# Patient Record
Sex: FEMALE | Race: WHITE | NOT HISPANIC OR LATINO | Employment: OTHER | URBAN - METROPOLITAN AREA
[De-identification: names, ages, dates, MRNs, and addresses within clinical notes are randomized per-mention and may not be internally consistent; named-entity substitution may affect disease eponyms.]

---

## 2021-02-05 ENCOUNTER — HOSPITAL ENCOUNTER (OUTPATIENT)
Dept: GENERAL RADIOLOGY | Facility: HOSPITAL | Age: 61
Discharge: HOME | End: 2021-02-05
Admitting: INTERNAL MEDICINE

## 2021-02-05 ENCOUNTER — TRANSCRIBE ORDERS (OUTPATIENT)
Dept: ADMINISTRATIVE | Facility: HOSPITAL | Age: 61
End: 2021-02-05

## 2021-02-05 DIAGNOSIS — U07.1 REAL TIME REVERSE TRANSCRIPTASE PCR POSITIVE FOR COVID-19 VIRUS: Primary | ICD-10-CM

## 2021-02-05 DIAGNOSIS — U07.1 REAL TIME REVERSE TRANSCRIPTASE PCR POSITIVE FOR COVID-19 VIRUS: ICD-10-CM

## 2021-02-05 PROCEDURE — 71046 X-RAY EXAM CHEST 2 VIEWS: CPT

## 2021-03-17 ENCOUNTER — IMMUNIZATION (OUTPATIENT)
Dept: VACCINE CLINIC | Facility: HOSPITAL | Age: 61
End: 2021-03-17

## 2021-03-17 PROCEDURE — 91300 HC SARSCOV02 VAC 30MCG/0.3ML IM: CPT | Performed by: INTERNAL MEDICINE

## 2021-03-17 PROCEDURE — 0001A: CPT | Performed by: INTERNAL MEDICINE

## 2021-04-07 ENCOUNTER — IMMUNIZATION (OUTPATIENT)
Dept: VACCINE CLINIC | Facility: HOSPITAL | Age: 61
End: 2021-04-07

## 2021-04-07 PROCEDURE — 0002A: CPT | Performed by: INTERNAL MEDICINE

## 2021-04-07 PROCEDURE — 91300 HC SARSCOV02 VAC 30MCG/0.3ML IM: CPT | Performed by: INTERNAL MEDICINE

## 2021-06-30 ENCOUNTER — TRANSCRIBE ORDERS (OUTPATIENT)
Dept: ADMINISTRATIVE | Facility: HOSPITAL | Age: 61
End: 2021-06-30

## 2021-06-30 DIAGNOSIS — Z12.31 VISIT FOR SCREENING MAMMOGRAM: Primary | ICD-10-CM

## 2021-07-08 ENCOUNTER — HOSPITAL ENCOUNTER (OUTPATIENT)
Dept: GENERAL RADIOLOGY | Facility: HOSPITAL | Age: 61
Discharge: HOME | End: 2021-07-08
Admitting: PHYSICIAN ASSISTANT

## 2021-07-08 ENCOUNTER — TRANSCRIBE ORDERS (OUTPATIENT)
Dept: ADMINISTRATIVE | Facility: HOSPITAL | Age: 61
End: 2021-07-08

## 2021-07-08 DIAGNOSIS — S99.922A FOOT INJURY, LEFT, INITIAL ENCOUNTER: Primary | ICD-10-CM

## 2021-07-08 PROCEDURE — 73630 X-RAY EXAM OF FOOT: CPT

## 2021-08-30 ENCOUNTER — APPOINTMENT (OUTPATIENT)
Dept: OTHER | Facility: HOSPITAL | Age: 61
End: 2021-08-30

## 2021-08-30 ENCOUNTER — HOSPITAL ENCOUNTER (OUTPATIENT)
Dept: MAMMOGRAPHY | Facility: HOSPITAL | Age: 61
Discharge: HOME | End: 2021-08-30
Admitting: INTERNAL MEDICINE

## 2021-08-30 DIAGNOSIS — Z12.31 VISIT FOR SCREENING MAMMOGRAM: ICD-10-CM

## 2021-08-30 PROCEDURE — 77067 SCR MAMMO BI INCL CAD: CPT

## 2021-08-30 PROCEDURE — 77063 BREAST TOMOSYNTHESIS BI: CPT | Performed by: RADIOLOGY

## 2021-08-30 PROCEDURE — 77063 BREAST TOMOSYNTHESIS BI: CPT

## 2021-08-30 PROCEDURE — 77067 SCR MAMMO BI INCL CAD: CPT | Performed by: RADIOLOGY

## 2021-09-03 ENCOUNTER — HOSPITAL ENCOUNTER (OUTPATIENT)
Dept: MAMMOGRAPHY | Facility: HOSPITAL | Age: 61
Discharge: HOME | End: 2021-09-03

## 2021-09-03 ENCOUNTER — HOSPITAL ENCOUNTER (OUTPATIENT)
Dept: ULTRASOUND IMAGING | Facility: HOSPITAL | Age: 61
Discharge: HOME | End: 2021-09-03

## 2021-09-03 DIAGNOSIS — R92.8 ABNORMAL MAMMOGRAM: ICD-10-CM

## 2021-09-03 PROCEDURE — 88360 TUMOR IMMUNOHISTOCHEM/MANUAL: CPT | Performed by: INTERNAL MEDICINE

## 2021-09-03 PROCEDURE — 76642 ULTRASOUND BREAST LIMITED: CPT | Performed by: RADIOLOGY

## 2021-09-03 PROCEDURE — 88305 TISSUE EXAM BY PATHOLOGIST: CPT | Performed by: INTERNAL MEDICINE

## 2021-09-03 PROCEDURE — 88341 IMHCHEM/IMCYTCHM EA ADD ANTB: CPT | Performed by: INTERNAL MEDICINE

## 2021-09-03 PROCEDURE — 19083 BX BREAST 1ST LESION US IMAG: CPT | Performed by: RADIOLOGY

## 2021-09-03 PROCEDURE — 77065 DX MAMMO INCL CAD UNI: CPT | Performed by: RADIOLOGY

## 2021-09-03 PROCEDURE — 77061 BREAST TOMOSYNTHESIS UNI: CPT | Performed by: RADIOLOGY

## 2021-09-03 PROCEDURE — A4648 IMPLANTABLE TISSUE MARKER: HCPCS

## 2021-09-03 PROCEDURE — 88342 IMHCHEM/IMCYTCHM 1ST ANTB: CPT | Performed by: INTERNAL MEDICINE

## 2021-09-03 PROCEDURE — 76642 ULTRASOUND BREAST LIMITED: CPT

## 2021-09-03 PROCEDURE — 25010000003 LIDOCAINE 1 % SOLUTION: Performed by: RADIOLOGY

## 2021-09-03 PROCEDURE — 77065 DX MAMMO INCL CAD UNI: CPT

## 2021-09-03 PROCEDURE — G0279 TOMOSYNTHESIS, MAMMO: HCPCS

## 2021-09-03 RX ORDER — LIDOCAINE HYDROCHLORIDE 10 MG/ML
5 INJECTION, SOLUTION INFILTRATION; PERINEURAL ONCE
Status: COMPLETED | OUTPATIENT
Start: 2021-09-03 | End: 2021-09-03

## 2021-09-03 RX ORDER — LIDOCAINE HYDROCHLORIDE AND EPINEPHRINE 10; 10 MG/ML; UG/ML
10 INJECTION, SOLUTION INFILTRATION; PERINEURAL ONCE
Status: COMPLETED | OUTPATIENT
Start: 2021-09-03 | End: 2021-09-03

## 2021-09-03 RX ADMIN — LIDOCAINE HYDROCHLORIDE,EPINEPHRINE BITARTRATE 6 ML: 10; .01 INJECTION, SOLUTION INFILTRATION; PERINEURAL at 15:21

## 2021-09-03 RX ADMIN — LIDOCAINE HYDROCHLORIDE 1 ML: 10 INJECTION, SOLUTION INFILTRATION; PERINEURAL at 15:21

## 2021-09-08 ENCOUNTER — TELEPHONE (OUTPATIENT)
Dept: MAMMOGRAPHY | Facility: HOSPITAL | Age: 61
End: 2021-09-08

## 2021-09-08 NOTE — TELEPHONE ENCOUNTER
Returned patient's 's call, requesting pathology results if available; notified report not available. Questions answered; support given; told they will be notified when results are available. Encouraged to call back with further questions or concerns. Patient's  verbalized understanding.

## 2021-09-09 ENCOUNTER — TELEPHONE (OUTPATIENT)
Dept: MAMMOGRAPHY | Facility: HOSPITAL | Age: 61
End: 2021-09-09

## 2021-09-09 LAB
CYTO UR: NORMAL
LAB AP CASE REPORT: NORMAL
LAB AP DIAGNOSIS COMMENT: NORMAL
LAB AP SPECIAL STAINS: NORMAL
PATH REPORT.FINAL DX SPEC: NORMAL
PATH REPORT.GROSS SPEC: NORMAL

## 2021-09-09 NOTE — TELEPHONE ENCOUNTER
Referring provider's office notified pathology returned as cancer & patient will be notified.   Pt notified of pathology results and recommendation. Verbalizes understanding. Denies discomfort. Denies signs and symptoms of infection.   Patient desires Dr Mc for surgical consult. Reviewed what would be discussed at surgical consult visit, including detailed explanation of pathology report & imaging reports; treatment options & pros/cons, availability of Breast Nurse Navigator. Patient encouraged to call back or contact Breast Nurse Navigator, with any questions or concerns.   Pt information sent to Breast Nurse Navigator for evaluation & Genetics for possible referral for genetic counseling.  Breast cancer information packet offered and accepted.

## 2021-09-10 ENCOUNTER — TELEPHONE (OUTPATIENT)
Dept: MAMMOGRAPHY | Facility: HOSPITAL | Age: 61
End: 2021-09-10

## 2021-09-10 NOTE — TELEPHONE ENCOUNTER
Pt notified of surgical consult appointment with Dr. Mc on 9.22.21 @ 5238. Pt given office contact & location information. Told to bring photo ID, list of prescription & OTC medications, insurance information, must wear a mask & she can bring one person with her who must also wear a mask.  Encouraged pt to call back or contact surgeon's office with further questions. Pt verbalized understanding.

## 2021-09-13 ENCOUNTER — NURSE NAVIGATOR (OUTPATIENT)
Dept: OTHER | Facility: HOSPITAL | Age: 61
End: 2021-09-13

## 2021-09-13 NOTE — PROGRESS NOTES
Patient and her , Jose phoned on speaker phone to discuss pathology report from breast biopsy - I reviewed with them. I called and spoke to Dr LOPEZ - we will move patients appointment from 9/22/2021 to 9/15/2021 4PM. I will speak to patients daughter Evonne this afternoon on the phone - she is away at college and very concerned. The patient and her  verbalized having a better understanding of the pathology and plan of care.

## 2021-09-15 ENCOUNTER — NURSE NAVIGATOR (OUTPATIENT)
Dept: OTHER | Facility: HOSPITAL | Age: 61
End: 2021-09-15

## 2021-09-16 NOTE — PROGRESS NOTES
I saw patient with Dr LOPEZ and patients , Jose. Patients daughter Evonne was on speaker phone- DR LOPEZ reviewed the pathology report and surgical/treatment options. Left breast, stage IA, HG IDC, ER/ME positive and HER negative breast cancer- the patient will wait on MRI to make final surgical decision. Educational and Supportive materials were given and reviewed.     Dr LOPEZ wrote for xanax - patient states she will take for MRI - I reviewed instructions that someone will need to drive her to MRI and to wait until she arrives and has signed consent before taking xanax - she verbalized understanding. Patient met with Rita Guerrero OT.

## 2021-09-28 ENCOUNTER — HOSPITAL ENCOUNTER (OUTPATIENT)
Dept: MRI IMAGING | Facility: HOSPITAL | Age: 61
Discharge: HOME | End: 2021-09-28
Admitting: SURGERY

## 2021-09-28 DIAGNOSIS — C50.412 MALIGNANT NEOPLASM OF UPPER-OUTER QUADRANT OF LEFT FEMALE BREAST (HCC): ICD-10-CM

## 2021-09-28 LAB — CREAT BLDA-MCNC: 0.9 MG/DL (ref 0.6–1.3)

## 2021-09-28 PROCEDURE — A9577 INJ MULTIHANCE: HCPCS | Performed by: SURGERY

## 2021-09-28 PROCEDURE — 82565 ASSAY OF CREATININE: CPT

## 2021-09-28 PROCEDURE — 77049 MRI BREAST C-+ W/CAD BI: CPT

## 2021-09-28 PROCEDURE — 77049 MRI BREAST C-+ W/CAD BI: CPT | Performed by: RADIOLOGY

## 2021-09-28 PROCEDURE — 0 GADOBENATE DIMEGLUMINE 529 MG/ML SOLUTION: Performed by: SURGERY

## 2021-09-28 RX ADMIN — GADOBENATE DIMEGLUMINE 19 ML: 529 INJECTION, SOLUTION INTRAVENOUS at 09:04

## 2021-09-30 ENCOUNTER — TRANSCRIBE ORDERS (OUTPATIENT)
Dept: ADMINISTRATIVE | Facility: HOSPITAL | Age: 61
End: 2021-09-30

## 2021-09-30 ENCOUNTER — NURSE NAVIGATOR (OUTPATIENT)
Dept: OTHER | Facility: HOSPITAL | Age: 61
End: 2021-09-30

## 2021-09-30 DIAGNOSIS — C50.412 MALIGNANT NEOPLASM OF UPPER-OUTER QUADRANT OF LEFT FEMALE BREAST, UNSPECIFIED ESTROGEN RECEPTOR STATUS (HCC): Primary | ICD-10-CM

## 2021-09-30 NOTE — PROGRESS NOTES
Patient's  left a message that they received a call from Dr. LOPEZ's office regarding the next steps. They do not need further assistance and will call with any new concerns.

## 2021-09-30 NOTE — PROGRESS NOTES
Returned call regarding MRI results and questions. Left a VM to please return call. Will follow up.

## 2021-10-01 ENCOUNTER — HOSPITAL ENCOUNTER (OUTPATIENT)
Dept: CT IMAGING | Facility: HOSPITAL | Age: 61
Discharge: HOME | End: 2021-10-01
Admitting: SURGERY

## 2021-10-01 DIAGNOSIS — C50.412 MALIGNANT NEOPLASM OF UPPER-OUTER QUADRANT OF LEFT FEMALE BREAST, UNSPECIFIED ESTROGEN RECEPTOR STATUS (HCC): ICD-10-CM

## 2021-10-01 PROCEDURE — 25010000002 IOPAMIDOL 61 % SOLUTION: Performed by: SURGERY

## 2021-10-01 PROCEDURE — 71260 CT THORAX DX C+: CPT

## 2021-10-01 RX ADMIN — IOPAMIDOL 95 ML: 612 INJECTION, SOLUTION INTRAVENOUS at 08:45

## 2021-10-04 ENCOUNTER — TRANSCRIBE ORDERS (OUTPATIENT)
Dept: ADMINISTRATIVE | Facility: HOSPITAL | Age: 61
End: 2021-10-04

## 2021-10-04 DIAGNOSIS — C50.412 MALIGNANT NEOPLASM OF UPPER-OUTER QUADRANT OF LEFT FEMALE BREAST, UNSPECIFIED ESTROGEN RECEPTOR STATUS (HCC): Primary | ICD-10-CM

## 2021-10-08 ENCOUNTER — PRE-ADMISSION TESTING (OUTPATIENT)
Dept: PREADMISSION TESTING | Facility: HOSPITAL | Age: 61
End: 2021-10-08

## 2021-10-08 VITALS — HEIGHT: 64 IN | WEIGHT: 210.32 LBS | BODY MASS INDEX: 35.91 KG/M2

## 2021-10-08 LAB
ALBUMIN SERPL-MCNC: 4.5 G/DL (ref 3.5–5.2)
ALBUMIN/GLOB SERPL: 1.9 G/DL
ALP SERPL-CCNC: 130 U/L (ref 39–117)
ALT SERPL W P-5'-P-CCNC: 27 U/L (ref 1–33)
ANION GAP SERPL CALCULATED.3IONS-SCNC: 11 MMOL/L (ref 5–15)
ANION GAP SERPL CALCULATED.3IONS-SCNC: 11 MMOL/L (ref 5–15)
AST SERPL-CCNC: 24 U/L (ref 1–32)
BILIRUB SERPL-MCNC: 0.3 MG/DL (ref 0–1.2)
BUN SERPL-MCNC: 22 MG/DL (ref 6–20)
BUN SERPL-MCNC: 22 MG/DL (ref 6–20)
BUN/CREAT SERPL: 23.2 (ref 7–25)
BUN/CREAT SERPL: 23.2 (ref 7–25)
CALCIUM SPEC-SCNC: 9.9 MG/DL (ref 8.6–10.5)
CALCIUM SPEC-SCNC: 9.9 MG/DL (ref 8.6–10.5)
CHLORIDE SERPL-SCNC: 104 MMOL/L (ref 98–107)
CHLORIDE SERPL-SCNC: 104 MMOL/L (ref 98–107)
CO2 SERPL-SCNC: 26 MMOL/L (ref 22–29)
CO2 SERPL-SCNC: 26 MMOL/L (ref 22–29)
CREAT SERPL-MCNC: 0.95 MG/DL (ref 0.57–1)
CREAT SERPL-MCNC: 0.95 MG/DL (ref 0.57–1)
DEPRECATED RDW RBC AUTO: 43.8 FL (ref 37–54)
ERYTHROCYTE [DISTWIDTH] IN BLOOD BY AUTOMATED COUNT: 12.4 % (ref 12.3–15.4)
GFR SERPL CREATININE-BSD FRML MDRD: 60 ML/MIN/1.73
GFR SERPL CREATININE-BSD FRML MDRD: 60 ML/MIN/1.73
GLOBULIN UR ELPH-MCNC: 2.4 GM/DL
GLUCOSE SERPL-MCNC: 96 MG/DL (ref 65–99)
GLUCOSE SERPL-MCNC: 96 MG/DL (ref 65–99)
HCT VFR BLD AUTO: 40.4 % (ref 34–46.6)
HGB BLD-MCNC: 13.1 G/DL (ref 12–15.9)
MCH RBC QN AUTO: 31.3 PG (ref 26.6–33)
MCHC RBC AUTO-ENTMCNC: 32.4 G/DL (ref 31.5–35.7)
MCV RBC AUTO: 96.7 FL (ref 79–97)
PLATELET # BLD AUTO: 253 10*3/MM3 (ref 140–450)
PMV BLD AUTO: 9.5 FL (ref 6–12)
POTASSIUM SERPL-SCNC: 4.4 MMOL/L (ref 3.5–5.2)
POTASSIUM SERPL-SCNC: 4.4 MMOL/L (ref 3.5–5.2)
PROT SERPL-MCNC: 6.9 G/DL (ref 6–8.5)
RBC # BLD AUTO: 4.18 10*6/MM3 (ref 3.77–5.28)
SODIUM SERPL-SCNC: 141 MMOL/L (ref 136–145)
SODIUM SERPL-SCNC: 141 MMOL/L (ref 136–145)
WBC # BLD AUTO: 4.86 10*3/MM3 (ref 3.4–10.8)

## 2021-10-08 PROCEDURE — 85027 COMPLETE CBC AUTOMATED: CPT

## 2021-10-08 PROCEDURE — 80053 COMPREHEN METABOLIC PANEL: CPT

## 2021-10-08 PROCEDURE — 36415 COLL VENOUS BLD VENIPUNCTURE: CPT

## 2021-10-08 RX ORDER — LOSARTAN POTASSIUM 50 MG/1
50 TABLET ORAL EVERY MORNING
COMMUNITY

## 2021-10-08 RX ORDER — MULTIPLE VITAMINS W/ MINERALS TAB 9MG-400MCG
1 TAB ORAL DAILY
COMMUNITY

## 2021-10-08 RX ORDER — ASPIRIN 81 MG/1
81 TABLET ORAL DAILY
COMMUNITY
End: 2021-10-15 | Stop reason: HOSPADM

## 2021-10-08 RX ORDER — BIOTIN 10 MG
TABLET ORAL
COMMUNITY
End: 2022-04-22

## 2021-10-08 RX ORDER — ALPRAZOLAM 0.5 MG/1
0.25 TABLET ORAL EVERY 8 HOURS PRN
Status: ON HOLD | COMMUNITY
End: 2022-11-03

## 2021-10-08 NOTE — PAT
Patient to apply Chlorhexadine wipes  to surgical area (as instructed) the night before procedure and the AM of procedure. Wipes provided.    Patient instructed to drink 20 ounces (or until full) of Gatorade and it needs to be completed 1 hour (for Main OR patients) or 2 hours (scheduled  section patients) before given arrival time for procedure (NO RED Gatorade)    Patient verbalized understanding.  Per Anesthesia Request, patient instructed not to take their ACE/ARB medications on the AM of surgery.

## 2021-10-12 ENCOUNTER — APPOINTMENT (OUTPATIENT)
Dept: PREADMISSION TESTING | Facility: HOSPITAL | Age: 61
End: 2021-10-12

## 2021-10-12 LAB — SARS-COV-2 RNA PNL SPEC NAA+PROBE: NOT DETECTED

## 2021-10-12 PROCEDURE — C9803 HOPD COVID-19 SPEC COLLECT: HCPCS

## 2021-10-12 PROCEDURE — U0004 COV-19 TEST NON-CDC HGH THRU: HCPCS

## 2021-10-14 ENCOUNTER — ANESTHESIA (OUTPATIENT)
Dept: PERIOP | Facility: HOSPITAL | Age: 61
End: 2021-10-14

## 2021-10-14 ENCOUNTER — ANESTHESIA EVENT (OUTPATIENT)
Dept: PERIOP | Facility: HOSPITAL | Age: 61
End: 2021-10-14

## 2021-10-14 ENCOUNTER — ANESTHESIA EVENT CONVERTED (OUTPATIENT)
Dept: ANESTHESIOLOGY | Facility: HOSPITAL | Age: 61
End: 2021-10-14

## 2021-10-14 ENCOUNTER — APPOINTMENT (OUTPATIENT)
Dept: PET IMAGING | Facility: HOSPITAL | Age: 61
End: 2021-10-14

## 2021-10-14 ENCOUNTER — HOSPITAL ENCOUNTER (OUTPATIENT)
Facility: HOSPITAL | Age: 61
Discharge: HOME | End: 2021-10-15
Attending: SURGERY | Admitting: SURGERY

## 2021-10-14 ENCOUNTER — HOSPITAL ENCOUNTER (OUTPATIENT)
Dept: NUCLEAR MEDICINE | Facility: HOSPITAL | Age: 61
Discharge: HOME | End: 2021-10-14

## 2021-10-14 DIAGNOSIS — C50.412 MALIGNANT NEOPLASM OF UPPER-OUTER QUADRANT OF LEFT FEMALE BREAST, UNSPECIFIED ESTROGEN RECEPTOR STATUS (HCC): ICD-10-CM

## 2021-10-14 DIAGNOSIS — C50.919 BREAST CANCER: ICD-10-CM

## 2021-10-14 PROCEDURE — 88307 TISSUE EXAM BY PATHOLOGIST: CPT | Performed by: SURGERY

## 2021-10-14 PROCEDURE — 25010000002 NEOSTIGMINE 10 MG/10ML SOLUTION: Performed by: NURSE ANESTHETIST, CERTIFIED REGISTERED

## 2021-10-14 PROCEDURE — C1789 PROSTHESIS, BREAST, IMP: HCPCS | Performed by: SURGERY

## 2021-10-14 PROCEDURE — 19303 MAST SIMPLE COMPLETE: CPT | Performed by: PHYSICIAN ASSISTANT

## 2021-10-14 PROCEDURE — 88331 PATH CONSLTJ SURG 1 BLK 1SPC: CPT | Performed by: PATHOLOGY

## 2021-10-14 PROCEDURE — A9541 TC99M SULFUR COLLOID: HCPCS | Performed by: SURGERY

## 2021-10-14 PROCEDURE — 38792 RA TRACER ID OF SENTINL NODE: CPT

## 2021-10-14 PROCEDURE — 25010000002 FENTANYL CITRATE (PF) 50 MCG/ML SOLUTION: Performed by: NURSE ANESTHETIST, CERTIFIED REGISTERED

## 2021-10-14 PROCEDURE — 25010000002 DEXAMETHASONE PER 1 MG: Performed by: NURSE ANESTHETIST, CERTIFIED REGISTERED

## 2021-10-14 PROCEDURE — 25010000003 CEFAZOLIN PER 500 MG: Performed by: SURGERY

## 2021-10-14 PROCEDURE — 25010000002 PROPOFOL 10 MG/ML EMULSION: Performed by: NURSE ANESTHETIST, CERTIFIED REGISTERED

## 2021-10-14 PROCEDURE — 25010000002 GENTAMICIN PER 80 MG: Performed by: SURGERY

## 2021-10-14 PROCEDURE — 25010000003 CEFAZOLIN IN DEXTROSE 2-4 GM/100ML-% SOLUTION: Performed by: PLASTIC SURGERY

## 2021-10-14 PROCEDURE — C1889 IMPLANT/INSERT DEVICE, NOC: HCPCS | Performed by: SURGERY

## 2021-10-14 PROCEDURE — 0 TECHNETIUM FILTERED SULFUR COLLOID: Performed by: SURGERY

## 2021-10-14 PROCEDURE — C1713 ANCHOR/SCREW BN/BN,TIS/BN: HCPCS | Performed by: SURGERY

## 2021-10-14 PROCEDURE — 0 TECHNETIUM MEDRONATE KIT: Performed by: SURGERY

## 2021-10-14 PROCEDURE — 25010000002 DEXAMETHASONE SODIUM PHOSPHATE 10 MG/ML SOLUTION: Performed by: ANESTHESIOLOGY

## 2021-10-14 PROCEDURE — 94799 UNLISTED PULMONARY SVC/PX: CPT

## 2021-10-14 PROCEDURE — A9503 TC99M MEDRONATE: HCPCS | Performed by: SURGERY

## 2021-10-14 DEVICE — DEV CONTRL TISS STRATAFIX SPIRAL MNCRYL UD 3/0 PLS 30CM: Type: IMPLANTABLE DEVICE | Site: BREAST | Status: FUNCTIONAL

## 2021-10-14 DEVICE — GRFT TISS ALLODERM RTM PERF 16X20CM 2.4MM/THK .4MM: Type: IMPLANTABLE DEVICE | Site: BREAST | Status: FUNCTIONAL

## 2021-10-14 DEVICE — STIMULAN® RAPID CURE PROVIDED STERILE FOR SINGLE PATIENT USE. STIMULAN® RAPID CURE CONTAINS CALCIUM SULFATE POWDER AND MIXING SOLUTION IN PRE-MEASURED QUANTITIES SO THAT WHEN MIXED TOGETHER IN A STERILE MIXING BOWL, THE RESULTANT PASTE IS TO BE DIGITALLY PACKED INTO OPEN BONE VOID/GAP TO SET INSITU OR PLACED INTO THE MOULD PROVIDED, THE MIXTURE SETS TO FORM BEADS. THE BIODEGRADABLE, RADIOPAQUE BEADS ARE RESORBED IN APPROXIMATELY 30 – 60 DAYS WHEN USED IN ACCORDANCE WITH THE DEVICE LABELLING. STIMULAN® RAPID CURE IS MANUFACTURED FROM SYNTHETIC IMPLANT GRADE CALCIUM SULFATE DIHYDRATE(CASO4.2H2O) THAT RESORBS AND IS REPLACED WITH BONE DURING THE HEALING PROCESS. ALSO, AS THE BONE VOID FILLER BEADS ARE BIODEGRADABLE AND BIOCOMPATIBLE, THEY MAY BE USED AT AN INFECTED SITE.
Type: IMPLANTABLE DEVICE | Site: BREAST | Status: FUNCTIONAL
Brand: STIMULAN® RAPID CURE

## 2021-10-14 DEVICE — EXPNDR TISS BRST F/HT V/P STL 133S FV/T 400CC: Type: IMPLANTABLE DEVICE | Site: BREAST | Status: FUNCTIONAL

## 2021-10-14 RX ORDER — CEFAZOLIN SODIUM 2 G/100ML
2 INJECTION, SOLUTION INTRAVENOUS ONCE
Status: COMPLETED | OUTPATIENT
Start: 2021-10-14 | End: 2021-10-14

## 2021-10-14 RX ORDER — EPHEDRINE SULFATE 50 MG/ML
INJECTION, SOLUTION INTRAVENOUS AS NEEDED
Status: DISCONTINUED | OUTPATIENT
Start: 2021-10-14 | End: 2021-10-14 | Stop reason: SURG

## 2021-10-14 RX ORDER — LOSARTAN POTASSIUM 50 MG/1
50 TABLET ORAL DAILY
Status: DISCONTINUED | OUTPATIENT
Start: 2021-10-14 | End: 2021-10-15 | Stop reason: HOSPADM

## 2021-10-14 RX ORDER — PROPOFOL 10 MG/ML
VIAL (ML) INTRAVENOUS AS NEEDED
Status: DISCONTINUED | OUTPATIENT
Start: 2021-10-14 | End: 2021-10-14 | Stop reason: SURG

## 2021-10-14 RX ORDER — SODIUM CHLORIDE 9 MG/ML
INJECTION, SOLUTION INTRAVENOUS AS NEEDED
Status: DISCONTINUED | OUTPATIENT
Start: 2021-10-14 | End: 2021-10-14 | Stop reason: HOSPADM

## 2021-10-14 RX ORDER — FENTANYL CITRATE 50 UG/ML
INJECTION, SOLUTION INTRAMUSCULAR; INTRAVENOUS
Status: DISPENSED
Start: 2021-10-14 | End: 2021-10-15

## 2021-10-14 RX ORDER — MELOXICAM 15 MG/1
15 TABLET ORAL ONCE
Status: COMPLETED | OUTPATIENT
Start: 2021-10-14 | End: 2021-10-14

## 2021-10-14 RX ORDER — BUPIVACAINE HYDROCHLORIDE 2.5 MG/ML
INJECTION, SOLUTION EPIDURAL; INFILTRATION; INTRACAUDAL
Status: COMPLETED | OUTPATIENT
Start: 2021-10-14 | End: 2021-10-14

## 2021-10-14 RX ORDER — ONDANSETRON 2 MG/ML
4 INJECTION INTRAMUSCULAR; INTRAVENOUS EVERY 6 HOURS PRN
Status: DISCONTINUED | OUTPATIENT
Start: 2021-10-14 | End: 2021-10-15 | Stop reason: HOSPADM

## 2021-10-14 RX ORDER — SODIUM CHLORIDE, SODIUM LACTATE, POTASSIUM CHLORIDE, CALCIUM CHLORIDE 600; 310; 30; 20 MG/100ML; MG/100ML; MG/100ML; MG/100ML
1000 INJECTION, SOLUTION INTRAVENOUS CONTINUOUS
Status: DISCONTINUED | OUTPATIENT
Start: 2021-10-14 | End: 2021-10-15

## 2021-10-14 RX ORDER — ACETAMINOPHEN 500 MG
1000 TABLET ORAL ONCE
Status: COMPLETED | OUTPATIENT
Start: 2021-10-14 | End: 2021-10-14

## 2021-10-14 RX ORDER — LORAZEPAM 0.5 MG/1
0.5 TABLET ORAL EVERY 8 HOURS PRN
Status: DISCONTINUED | OUTPATIENT
Start: 2021-10-14 | End: 2021-10-15 | Stop reason: HOSPADM

## 2021-10-14 RX ORDER — FAMOTIDINE 20 MG/1
20 TABLET, FILM COATED ORAL
Status: COMPLETED | OUTPATIENT
Start: 2021-10-14 | End: 2021-10-14

## 2021-10-14 RX ORDER — ACETAMINOPHEN 500 MG
1000 TABLET ORAL EVERY 6 HOURS
Status: DISCONTINUED | OUTPATIENT
Start: 2021-10-14 | End: 2021-10-15 | Stop reason: HOSPADM

## 2021-10-14 RX ORDER — CEFAZOLIN SODIUM 2 G/100ML
2 INJECTION, SOLUTION INTRAVENOUS EVERY 8 HOURS
Status: DISCONTINUED | OUTPATIENT
Start: 2021-10-15 | End: 2021-10-15 | Stop reason: HOSPADM

## 2021-10-14 RX ORDER — PROMETHAZINE HYDROCHLORIDE 12.5 MG/1
6.25 TABLET ORAL EVERY 6 HOURS PRN
Status: DISCONTINUED | OUTPATIENT
Start: 2021-10-14 | End: 2021-10-15 | Stop reason: HOSPADM

## 2021-10-14 RX ORDER — NALOXONE HCL 0.4 MG/ML
0.1 VIAL (ML) INJECTION
Status: DISCONTINUED | OUTPATIENT
Start: 2021-10-14 | End: 2021-10-15 | Stop reason: HOSPADM

## 2021-10-14 RX ORDER — FENTANYL CITRATE 50 UG/ML
50 INJECTION, SOLUTION INTRAMUSCULAR; INTRAVENOUS
Status: DISCONTINUED | OUTPATIENT
Start: 2021-10-14 | End: 2021-10-14 | Stop reason: HOSPADM

## 2021-10-14 RX ORDER — PROMETHAZINE HYDROCHLORIDE 12.5 MG/1
6.25 SUPPOSITORY RECTAL EVERY 6 HOURS PRN
Status: DISCONTINUED | OUTPATIENT
Start: 2021-10-14 | End: 2021-10-15 | Stop reason: HOSPADM

## 2021-10-14 RX ORDER — LIDOCAINE HYDROCHLORIDE 10 MG/ML
0.5 INJECTION, SOLUTION EPIDURAL; INFILTRATION; INTRACAUDAL; PERINEURAL ONCE AS NEEDED
Status: COMPLETED | OUTPATIENT
Start: 2021-10-14 | End: 2021-10-14

## 2021-10-14 RX ORDER — DIPHENHYDRAMINE HYDROCHLORIDE 50 MG/ML
12.5 INJECTION INTRAMUSCULAR; INTRAVENOUS EVERY 6 HOURS PRN
Status: DISCONTINUED | OUTPATIENT
Start: 2021-10-14 | End: 2021-10-15 | Stop reason: HOSPADM

## 2021-10-14 RX ORDER — FENTANYL CITRATE 50 UG/ML
INJECTION, SOLUTION INTRAMUSCULAR; INTRAVENOUS AS NEEDED
Status: DISCONTINUED | OUTPATIENT
Start: 2021-10-14 | End: 2021-10-14 | Stop reason: SURG

## 2021-10-14 RX ORDER — DEXAMETHASONE SODIUM PHOSPHATE 10 MG/ML
INJECTION, SOLUTION INTRAMUSCULAR; INTRAVENOUS
Status: COMPLETED | OUTPATIENT
Start: 2021-10-14 | End: 2021-10-14

## 2021-10-14 RX ORDER — SODIUM CHLORIDE 0.9 % (FLUSH) 0.9 %
10 SYRINGE (ML) INJECTION AS NEEDED
Status: DISCONTINUED | OUTPATIENT
Start: 2021-10-14 | End: 2021-10-14 | Stop reason: HOSPADM

## 2021-10-14 RX ORDER — MELOXICAM 7.5 MG/1
15 TABLET ORAL DAILY
Status: DISCONTINUED | OUTPATIENT
Start: 2021-10-15 | End: 2021-10-15 | Stop reason: HOSPADM

## 2021-10-14 RX ORDER — DIAZEPAM 2 MG/1
2 TABLET ORAL EVERY 8 HOURS PRN
Status: DISCONTINUED | OUTPATIENT
Start: 2021-10-14 | End: 2021-10-15 | Stop reason: HOSPADM

## 2021-10-14 RX ORDER — SODIUM CHLORIDE 9 MG/ML
50 INJECTION, SOLUTION INTRAVENOUS CONTINUOUS
Status: DISCONTINUED | OUTPATIENT
Start: 2021-10-14 | End: 2021-10-15

## 2021-10-14 RX ORDER — OXYCODONE HYDROCHLORIDE 5 MG/1
5 TABLET ORAL EVERY 4 HOURS PRN
Status: DISCONTINUED | OUTPATIENT
Start: 2021-10-14 | End: 2021-10-15 | Stop reason: HOSPADM

## 2021-10-14 RX ORDER — GLYCOPYRROLATE 0.2 MG/ML
INJECTION INTRAMUSCULAR; INTRAVENOUS AS NEEDED
Status: DISCONTINUED | OUTPATIENT
Start: 2021-10-14 | End: 2021-10-14 | Stop reason: SURG

## 2021-10-14 RX ORDER — ROCURONIUM BROMIDE 10 MG/ML
INJECTION, SOLUTION INTRAVENOUS AS NEEDED
Status: DISCONTINUED | OUTPATIENT
Start: 2021-10-14 | End: 2021-10-14 | Stop reason: SURG

## 2021-10-14 RX ORDER — SCOLOPAMINE TRANSDERMAL SYSTEM 1 MG/1
1 PATCH, EXTENDED RELEASE TRANSDERMAL CONTINUOUS
Status: DISCONTINUED | OUTPATIENT
Start: 2021-10-14 | End: 2021-10-15 | Stop reason: HOSPADM

## 2021-10-14 RX ORDER — FAMOTIDINE 10 MG/ML
20 INJECTION, SOLUTION INTRAVENOUS
Status: COMPLETED | OUTPATIENT
Start: 2021-10-14 | End: 2021-10-14

## 2021-10-14 RX ORDER — DEXAMETHASONE SODIUM PHOSPHATE 4 MG/ML
INJECTION, SOLUTION INTRA-ARTICULAR; INTRALESIONAL; INTRAMUSCULAR; INTRAVENOUS; SOFT TISSUE AS NEEDED
Status: DISCONTINUED | OUTPATIENT
Start: 2021-10-14 | End: 2021-10-14 | Stop reason: SURG

## 2021-10-14 RX ORDER — TC 99M MEDRONATE 20 MG/10ML
550 INJECTION, POWDER, LYOPHILIZED, FOR SOLUTION INTRAVENOUS
Status: COMPLETED | OUTPATIENT
Start: 2021-10-14 | End: 2021-10-14

## 2021-10-14 RX ORDER — NEOSTIGMINE METHYLSULFATE 1 MG/ML
INJECTION, SOLUTION INTRAVENOUS AS NEEDED
Status: DISCONTINUED | OUTPATIENT
Start: 2021-10-14 | End: 2021-10-14 | Stop reason: SURG

## 2021-10-14 RX ORDER — PREGABALIN 75 MG/1
75 CAPSULE ORAL ONCE
Status: COMPLETED | OUTPATIENT
Start: 2021-10-14 | End: 2021-10-14

## 2021-10-14 RX ORDER — HYDROMORPHONE HYDROCHLORIDE 1 MG/ML
0.5 INJECTION, SOLUTION INTRAMUSCULAR; INTRAVENOUS; SUBCUTANEOUS
Status: DISCONTINUED | OUTPATIENT
Start: 2021-10-14 | End: 2021-10-14 | Stop reason: HOSPADM

## 2021-10-14 RX ADMIN — MELOXICAM 15 MG: 15 TABLET ORAL at 12:35

## 2021-10-14 RX ADMIN — SODIUM CHLORIDE, POTASSIUM CHLORIDE, SODIUM LACTATE AND CALCIUM CHLORIDE: 600; 310; 30; 20 INJECTION, SOLUTION INTRAVENOUS at 16:32

## 2021-10-14 RX ADMIN — LOSARTAN POTASSIUM 50 MG: 50 TABLET, FILM COATED ORAL at 20:17

## 2021-10-14 RX ADMIN — PROPOFOL 200 MG: 10 INJECTION, EMULSION INTRAVENOUS at 13:12

## 2021-10-14 RX ADMIN — PREGABALIN 75 MG: 75 CAPSULE ORAL at 12:35

## 2021-10-14 RX ADMIN — NEOSTIGMINE 2.5 MG: 1 INJECTION INTRAVENOUS at 16:49

## 2021-10-14 RX ADMIN — ACETAMINOPHEN 1000 MG: 500 TABLET, FILM COATED ORAL at 20:17

## 2021-10-14 RX ADMIN — ROCURONIUM BROMIDE 20 MG: 10 INJECTION, SOLUTION INTRAVENOUS at 14:04

## 2021-10-14 RX ADMIN — TECHNETIUM TC 99M SULFUR COLLOID 1 DOSE: KIT at 13:00

## 2021-10-14 RX ADMIN — Medication 550 MILLICURIE: at 13:02

## 2021-10-14 RX ADMIN — FENTANYL CITRATE 100 MCG: 50 INJECTION, SOLUTION INTRAMUSCULAR; INTRAVENOUS at 13:32

## 2021-10-14 RX ADMIN — CEFAZOLIN SODIUM 2 G: 2 INJECTION, SOLUTION INTRAVENOUS at 13:09

## 2021-10-14 RX ADMIN — GLYCOPYRROLATE 0.4 MG: 0.2 INJECTION INTRAMUSCULAR; INTRAVENOUS at 16:49

## 2021-10-14 RX ADMIN — CEFAZOLIN SODIUM 1 G: 2 INJECTION, SOLUTION INTRAVENOUS at 17:06

## 2021-10-14 RX ADMIN — SODIUM CHLORIDE, POTASSIUM CHLORIDE, SODIUM LACTATE AND CALCIUM CHLORIDE: 600; 310; 30; 20 INJECTION, SOLUTION INTRAVENOUS at 15:47

## 2021-10-14 RX ADMIN — DEXAMETHASONE SODIUM PHOSPHATE 8 MG: 4 INJECTION, SOLUTION INTRA-ARTICULAR; INTRALESIONAL; INTRAMUSCULAR; INTRAVENOUS; SOFT TISSUE at 13:12

## 2021-10-14 RX ADMIN — EPHEDRINE SULFATE 5 MG: 50 INJECTION INTRAVENOUS at 13:46

## 2021-10-14 RX ADMIN — ACETAMINOPHEN 1000 MG: 500 TABLET ORAL at 12:35

## 2021-10-14 RX ADMIN — SCOPALAMINE 1 PATCH: 1 PATCH, EXTENDED RELEASE TRANSDERMAL at 12:35

## 2021-10-14 RX ADMIN — OXYCODONE 5 MG: 5 TABLET ORAL at 20:16

## 2021-10-14 RX ADMIN — SODIUM CHLORIDE 50 ML/HR: 9 INJECTION, SOLUTION INTRAVENOUS at 20:17

## 2021-10-14 RX ADMIN — SODIUM CHLORIDE, POTASSIUM CHLORIDE, SODIUM LACTATE AND CALCIUM CHLORIDE 1000 ML: 600; 310; 30; 20 INJECTION, SOLUTION INTRAVENOUS at 12:30

## 2021-10-14 RX ADMIN — BUPIVACAINE HYDROCHLORIDE 60 ML: 2.5 INJECTION, SOLUTION EPIDURAL; INFILTRATION; INTRACAUDAL; PERINEURAL at 13:15

## 2021-10-14 RX ADMIN — ROCURONIUM BROMIDE 10 MG: 10 INJECTION, SOLUTION INTRAVENOUS at 14:43

## 2021-10-14 RX ADMIN — FENTANYL CITRATE 50 MCG: 50 INJECTION INTRAMUSCULAR; INTRAVENOUS at 17:48

## 2021-10-14 RX ADMIN — FENTANYL CITRATE 100 MCG: 50 INJECTION, SOLUTION INTRAMUSCULAR; INTRAVENOUS at 13:12

## 2021-10-14 RX ADMIN — FAMOTIDINE 20 MG: 20 TABLET, FILM COATED ORAL at 12:40

## 2021-10-14 RX ADMIN — ROCURONIUM BROMIDE 50 MG: 10 INJECTION, SOLUTION INTRAVENOUS at 13:12

## 2021-10-14 RX ADMIN — DEXAMETHASONE SODIUM PHOSPHATE 4 MG: 10 INJECTION, SOLUTION INTRAMUSCULAR; INTRAVENOUS at 13:15

## 2021-10-14 RX ADMIN — LIDOCAINE HYDROCHLORIDE 0.2 ML: 10 INJECTION, SOLUTION EPIDURAL; INFILTRATION; INTRACAUDAL; PERINEURAL at 12:41

## 2021-10-14 RX ADMIN — DIAZEPAM 2 MG: 2 TABLET ORAL at 23:40

## 2021-10-14 RX ADMIN — GLYCOPYRROLATE 0.2 MG: 0.2 INJECTION INTRAMUSCULAR; INTRAVENOUS at 13:43

## 2021-10-14 NOTE — ANESTHESIA PROCEDURE NOTES
Peripheral Block      Patient reassessed immediately prior to procedure    Patient location during procedure: OR  Reason for block: at surgeon's request and post-op pain management  Performed by  Anesthesiologist: Dashawn Nielson MD  Preanesthetic Checklist  Completed: patient identified, IV checked, site marked, risks and benefits discussed, surgical consent, monitors and equipment checked, pre-op evaluation and timeout performed  Prep:  Pt Position: supine  Sterile barriers:cap, gloves, gown and mask  Prep: ChloraPrep  Patient monitoring: blood pressure monitoring, continuous pulse oximetry and EKG  Procedure  Performed under: general  Guidance:ultrasound guided and landmark technique  Images:still images obtained, printed/placed on chart    Laterality:Bilateral  Block Type:PECS I and PECS II  Injection Technique:single-shot  Needle Type:short-bevel  Needle Gauge:20 G  Resistance on Injection: none    Medications Used: dexamethasone sodium phosphate injection, 4 mg  bupivacaine PF (MARCAINE) 0.25 % injection, 60 mL  Med admintered at 10/14/2021 1:15 PM      Medications  Preservative Free Saline:10ml  Comment:Block Injection:  Total volume of LA divided between Right and Left sided blocks         Post Assessment  Injection Assessment: negative aspiration for heme and incremental injection  Patient Tolerance:comfortable throughout block  Complications:no  Additional Notes  The pt. Was placed in the Supine Position and GA was induced     The insertion site was prepped with CHG and Ultrasound guidance with In-Plane techniquewas  a 4inch BBraun 360 degree echogenic needle was visualized.  Normal Saline PSF was  utilized for hydrodissection of tissue. PECS 1 Block- Pectoralis Major and Minor where identified and LA was injected between PMM and PmM at the level of the 3rd Rib(10ml),  PECS 2-  Pectoralis Minor and Serratus muscle where identified and the needle was advanced laterally in-plane with the 4th rib as a  backstop, pleura was monitored.  LA was injected between SA and PmM at the level of 4th rib( 20ml).  LA injection spread was visualized, injection was incremental 1-5ml, normal or low injection pressure, no intravascular injection, no pneumothorax appreciated.  Thank You.

## 2021-10-14 NOTE — ANESTHESIA PREPROCEDURE EVALUATION
Anesthesia Evaluation     Patient summary reviewed and Nursing notes reviewed   history of anesthetic complications: PONV               Airway   Mallampati: II  TM distance: >3 FB  Neck ROM: full  No difficulty expected  Dental - normal exam     Pulmonary - negative pulmonary ROS and normal exam   Cardiovascular - normal exam    (+) hypertension,       Neuro/Psych- negative ROS  GI/Hepatic/Renal/Endo    (+) morbid obesity,      Musculoskeletal     Abdominal  - normal exam    Bowel sounds: normal.   Substance History - negative use     OB/GYN negative ob/gyn ROS         Other   arthritis,    history of cancer (breast)                    Anesthesia Plan    ASA 3     general   (PECS blocks)  intravenous induction     Anesthetic plan, all risks, benefits, and alternatives have been provided, discussed and informed consent has been obtained with: patient.    Plan discussed with CRNA.

## 2021-10-15 VITALS
HEIGHT: 64 IN | BODY MASS INDEX: 35.85 KG/M2 | HEART RATE: 65 BPM | RESPIRATION RATE: 17 BRPM | SYSTOLIC BLOOD PRESSURE: 100 MMHG | WEIGHT: 210 LBS | DIASTOLIC BLOOD PRESSURE: 60 MMHG | OXYGEN SATURATION: 96 % | TEMPERATURE: 97.7 F

## 2021-10-15 PROCEDURE — 25010000003 CEFAZOLIN IN DEXTROSE 2-4 GM/100ML-% SOLUTION: Performed by: SURGERY

## 2021-10-15 RX ADMIN — CEFAZOLIN SODIUM 2 G: 2 INJECTION, SOLUTION INTRAVENOUS at 11:13

## 2021-10-15 RX ADMIN — OXYCODONE 5 MG: 5 TABLET ORAL at 12:17

## 2021-10-15 RX ADMIN — ACETAMINOPHEN 1000 MG: 500 TABLET, FILM COATED ORAL at 01:56

## 2021-10-15 RX ADMIN — CEFAZOLIN SODIUM 2 G: 2 INJECTION, SOLUTION INTRAVENOUS at 01:56

## 2021-10-15 RX ADMIN — MELOXICAM 15 MG: 7.5 TABLET ORAL at 08:41

## 2021-10-15 RX ADMIN — LOSARTAN POTASSIUM 50 MG: 50 TABLET, FILM COATED ORAL at 08:41

## 2021-10-15 RX ADMIN — OXYCODONE 5 MG: 5 TABLET ORAL at 05:24

## 2021-10-15 RX ADMIN — ACETAMINOPHEN 1000 MG: 500 TABLET, FILM COATED ORAL at 08:41

## 2021-10-15 NOTE — ANESTHESIA POSTPROCEDURE EVALUATION
Patient: Sina Greenfield    Procedure Summary     Date: 10/14/21 Room / Location:  [facility]    Anesthesia Start: 1309 Anesthesia Stop: 1724    Procedures:       SKIN SPARING MASTECTOMY, LEFT SENITNEL NODE BIOPSY, RIGHT PROPHYLACTIC SKIN SPARING MASTECTOMY (Bilateral Breast)      IMMEDIATE BILATERAL BREAST RECONSTRUCTION WITH TISSUE EXPANDERS AND ALLODERM (Bilateral Breast) Diagnosis:       Malignant neoplasm of upper-outer quadrant of right female breast      Acquired absence of breast and absent nipple, bilateral    Surgeons: Sylvester Mc MD; Mic Hannah MD Provider: Dashawn Nielson MD    Anesthesia Type: general ASA Status: 3          Anesthesia Type: general    Vitals  Vitals Value Taken Time   /77 10/14/21 1815   Temp 97.4 °F (36.3 °C) 10/14/21 1800   Pulse 74 10/14/21 1829   Resp 20 10/14/21 1815   SpO2 99 % 10/14/21 1829   Vitals shown include unvalidated device data.        Post Anesthesia Care and Evaluation    Patient location during evaluation: PACU  Patient participation: complete - patient participated  Level of consciousness: awake and alert  Pain management: adequate  Airway patency: patent  Anesthetic complications: No anesthetic complications  PONV Status: none  Cardiovascular status: hemodynamically stable and acceptable  Respiratory status: nonlabored ventilation, acceptable and nasal cannula  Hydration status: acceptable

## 2021-10-26 NOTE — PROGRESS NOTES
"  Subjective     PROBLEM LIST:  1. [id number] ER+ AK+ Her2 negative invasive ductal carcinoma of the left breast  A) bilateral mastectomy on 10/14/21.  Pathology showed a 1.5 cm high grade IDC.  0/2 SLN involved.  2. Hypertension  3. Sarcoidosis  4. Anxiety    CHIEF COMPLAINT: breast cancer      HISTORY OF PRESENT ILLNESS:  The patient is a 61 y.o. female, referred for evaluation of a recent diagnosis of breast cancer.    She is here with her  today.  She had a bilateral mastectomy and is undergoing reconstruction.  She says that she is going back to the OR tomorrow because the left nipple may need to be removed.    She has a history of atypical cells found in ovarian cyst to right after her daughter's birth 21 years ago.  She was followed with serial exams for several years after this.    She has a history of sarcoidosis, diagnosed with lymph node obtained by mediastinoscopy.  She never required any treatment.    REVIEW OF SYSTEMS:  A 14 point review of systems was performed and is negative except as noted above.    Past Medical History:   Diagnosis Date   • Anxiety    • Arthritis    • Cancer (HCC)     breast   • COVID-19 Jan 2021   • Hx of skin cancer, basal cell    • Hypertension    • Lung nodule    • Malignant neoplasm of upper-outer quadrant of left female breast (HCC) 10/14/2021   • PONV (postoperative nausea and vomiting)    • Sarcoidosis     10 years ago   • Wears reading eyeglasses                Objective     /67   Pulse 84   Temp 96.9 °F (36.1 °C) (Temporal)   Resp 18   Ht 162.6 cm (64.02\")   Wt 93 kg (205 lb)   SpO2 99%   BMI 35.17 kg/m²   Performance Status:0              General: well appearing female in no acute distress  Neuro: alert and oriented  HEENT: sclerae anicteric, oropharynx clear  Extremities: no lower extremity edema  Skin: no rashes, lesions, bruising, or petechiae  Psych: mood and affect appropriate    Lab Results   Component Value Date    WBC 4.86 10/08/2021    HGB " 13.1 10/08/2021    HCT 40.4 10/08/2021    MCV 96.7 10/08/2021     10/08/2021     Lab Results   Component Value Date    GLUCOSE 96 10/08/2021    GLUCOSE 96 10/08/2021    BUN 22 (H) 10/08/2021    BUN 22 (H) 10/08/2021    CREATININE 0.95 10/08/2021    CREATININE 0.95 10/08/2021    EGFRIFNONA 60 (L) 10/08/2021    EGFRIFNONA 60 (L) 10/08/2021    BCR 23.2 10/08/2021    BCR 23.2 10/08/2021    K 4.4 10/08/2021    K 4.4 10/08/2021    CO2 26.0 10/08/2021    CO2 26.0 10/08/2021    CALCIUM 9.9 10/08/2021    CALCIUM 9.9 10/08/2021    ALBUMIN 4.50 10/08/2021    AST 24 10/08/2021    ALT 27 10/08/2021       NM Hubbard Node Injection Only  Narrative: EXAMINATION: NM SENTINEL NODE INJECTION ONLY - 10/14/2021     INDICATION: C50.412-Malignant neoplasm of upper-outer quadrant of left  female breast      TECHNIQUE: Dictation for the purposes of radiopharmaceutical usage alone  with 550 uCi of technetium 99m filtered sulfur colloid utilized in the  patient's left breast for lymphoscintigraphy and sentinel node mapping.     COMPARISON: None     FINDINGS: Dictation for the purposes of radiopharmaceutical usage alone  with 550 uCi of technetium 99m filtered sulfur colloid utilized in the  patient's left breast for lymphoscintigraphy and sentinel node mapping.     Impression: Dictation for the purposes of radiopharmaceutical usage  alone with 550 uCi of technetium 99m filtered sulfur colloid utilized in  the patient's left breast for lymphoscintigraphy and sentinel node  mapping.      DICTATED:   10/14/2021  EDITED/ls :   10/14/2021     This report was finalized on 10/15/2021 3:34 PM by Dr. Eleno Zhou.               Assessment/Plan     Sina Greenfield is a 61 y.o. female with stage IA ER+ her2 negative IDC invasive ductal carcinoma of the left breast.    We discussed the use of the Oncotype recurrence score.  This test provides information about the biology and risk of recurrence for ER positive Her2-negative breast cancers.  It  is clear from previous studies that patients who have a low risk Oncotype do not benefit from adjuvant chemotherapy.  Conversely, patients with a high risk Oncotype can have a significant benefit from adjuvant chemotherapy.  Scores in the intermediate risk group may have a small benefit based on the patient's age.  We discussed that the Oncotype test is most useful if chemotherapy is an option that the patient is considering.     Regardless of the decision about chemotherapy, she is a candidate for adjuvant endocrine therapy with an aromatase inhibitor.  We reviewed the potential side effects of anastrozole including hot flashes, vaginal dryness, joint pain, decrease in bone density, and mood or sleep disturbance.    She mentions that she has a trip to [place] planned in early February.  We discussed that if she does receive chemotherapy, this potentially could be ending shortly before her departure.    F/u 3 weeks to review oncotype result.             A total greater than 60 mins minutes was spent in face to face patient time, examination, counseling, charting, reviewing test results, and reviewing outside records.    Krysten Anguiano MD    10/27/2021

## 2021-10-27 ENCOUNTER — PRE-ADMISSION TESTING (OUTPATIENT)
Dept: PREADMISSION TESTING | Facility: HOSPITAL | Age: 61
End: 2021-10-27

## 2021-10-27 ENCOUNTER — CONSULT (OUTPATIENT)
Dept: ONCOLOGY | Facility: CLINIC | Age: 61
End: 2021-10-27

## 2021-10-27 VITALS
BODY MASS INDEX: 35 KG/M2 | RESPIRATION RATE: 18 BRPM | OXYGEN SATURATION: 99 % | DIASTOLIC BLOOD PRESSURE: 67 MMHG | WEIGHT: 205 LBS | HEART RATE: 84 BPM | SYSTOLIC BLOOD PRESSURE: 133 MMHG | TEMPERATURE: 96.9 F | HEIGHT: 64 IN

## 2021-10-27 DIAGNOSIS — C50.412 MALIGNANT NEOPLASM OF UPPER-OUTER QUADRANT OF LEFT BREAST IN FEMALE, ESTROGEN RECEPTOR POSITIVE (HCC): Primary | ICD-10-CM

## 2021-10-27 DIAGNOSIS — Z17.0 MALIGNANT NEOPLASM OF UPPER-OUTER QUADRANT OF LEFT BREAST IN FEMALE, ESTROGEN RECEPTOR POSITIVE (HCC): Primary | ICD-10-CM

## 2021-10-27 LAB — SARS-COV-2 RNA RESP QL NAA+PROBE: NOT DETECTED

## 2021-10-27 PROCEDURE — U0003 INFECTIOUS AGENT DETECTION BY NUCLEIC ACID (DNA OR RNA); SEVERE ACUTE RESPIRATORY SYNDROME CORONAVIRUS 2 (SARS-COV-2) (CORONAVIRUS DISEASE [COVID-19]), AMPLIFIED PROBE TECHNIQUE, MAKING USE OF HIGH THROUGHPUT TECHNOLOGIES AS DESCRIBED BY CMS-2020-01-R: HCPCS | Performed by: PLASTIC SURGERY

## 2021-10-27 PROCEDURE — C9803 HOPD COVID-19 SPEC COLLECT: HCPCS

## 2021-10-27 PROCEDURE — 99205 OFFICE O/P NEW HI 60 MIN: CPT | Performed by: INTERNAL MEDICINE

## 2021-10-27 RX ORDER — OXYCODONE HYDROCHLORIDE 5 MG/1
TABLET ORAL
COMMUNITY
Start: 2021-10-22 | End: 2021-11-18

## 2021-10-27 RX ORDER — ONDANSETRON 4 MG/1
TABLET, ORALLY DISINTEGRATING ORAL
COMMUNITY
Start: 2021-10-22 | End: 2021-11-18

## 2021-10-27 RX ORDER — DOXYCYCLINE HYCLATE 100 MG/1
CAPSULE ORAL
COMMUNITY
Start: 2021-10-20 | End: 2021-11-18

## 2021-10-27 RX ORDER — DOCUSATE SODIUM 100 MG/1
CAPSULE, LIQUID FILLED ORAL
COMMUNITY
Start: 2021-10-11 | End: 2021-11-18

## 2021-10-28 ENCOUNTER — NURSE NAVIGATOR (OUTPATIENT)
Dept: OTHER | Facility: HOSPITAL | Age: 61
End: 2021-10-28

## 2021-10-28 ENCOUNTER — ANESTHESIA (OUTPATIENT)
Dept: PERIOP | Facility: HOSPITAL | Age: 61
End: 2021-10-28

## 2021-10-28 ENCOUNTER — HOSPITAL ENCOUNTER (OUTPATIENT)
Facility: HOSPITAL | Age: 61
Setting detail: SURGERY ADMIT
Discharge: HOME | End: 2021-10-28
Attending: PLASTIC SURGERY | Admitting: PLASTIC SURGERY

## 2021-10-28 ENCOUNTER — ANESTHESIA EVENT (OUTPATIENT)
Dept: PERIOP | Facility: HOSPITAL | Age: 61
End: 2021-10-28

## 2021-10-28 VITALS
HEART RATE: 74 BPM | DIASTOLIC BLOOD PRESSURE: 65 MMHG | HEIGHT: 64 IN | SYSTOLIC BLOOD PRESSURE: 165 MMHG | WEIGHT: 205 LBS | RESPIRATION RATE: 18 BRPM | BODY MASS INDEX: 35 KG/M2 | TEMPERATURE: 97.3 F | OXYGEN SATURATION: 97 %

## 2021-10-28 DIAGNOSIS — Z85.3 HISTORY OF CANCER OF LEFT BREAST: ICD-10-CM

## 2021-10-28 PROCEDURE — C1713 ANCHOR/SCREW BN/BN,TIS/BN: HCPCS | Performed by: PLASTIC SURGERY

## 2021-10-28 PROCEDURE — 25010000002 PROPOFOL 10 MG/ML EMULSION: Performed by: NURSE ANESTHETIST, CERTIFIED REGISTERED

## 2021-10-28 PROCEDURE — 25010000002 DEXAMETHASONE PER 1 MG: Performed by: NURSE ANESTHETIST, CERTIFIED REGISTERED

## 2021-10-28 PROCEDURE — C1889 IMPLANT/INSERT DEVICE, NOC: HCPCS | Performed by: PLASTIC SURGERY

## 2021-10-28 PROCEDURE — 25010000002 ONDANSETRON PER 1 MG: Performed by: NURSE ANESTHETIST, CERTIFIED REGISTERED

## 2021-10-28 PROCEDURE — 25010000002 CEFAZOLIN 1-4 GM/50ML-% SOLUTION: Performed by: PLASTIC SURGERY

## 2021-10-28 PROCEDURE — 88305 TISSUE EXAM BY PATHOLOGIST: CPT | Performed by: PLASTIC SURGERY

## 2021-10-28 PROCEDURE — 25010000002 FENTANYL CITRATE (PF) 50 MCG/ML SOLUTION: Performed by: NURSE ANESTHETIST, CERTIFIED REGISTERED

## 2021-10-28 PROCEDURE — 25010000002 FENTANYL CITRATE (PF) 50 MCG/ML SOLUTION

## 2021-10-28 PROCEDURE — 25010000002 GENTAMICIN PER 80 MG: Performed by: PLASTIC SURGERY

## 2021-10-28 PROCEDURE — 25010000002 VANCOMYCIN 1 G RECONSTITUTED SOLUTION 1 EACH VIAL: Performed by: PLASTIC SURGERY

## 2021-10-28 PROCEDURE — 25010000002 HYDROMORPHONE 1 MG/ML SOLUTION

## 2021-10-28 DEVICE — STIMULAN® RAPID CURE PROVIDED STERILE FOR SINGLE PATIENT USE. STIMULAN® RAPID CURE CONTAINS CALCIUM SULFATE POWDER AND MIXING SOLUTION IN PRE-MEASURED QUANTITIES SO THAT WHEN MIXED TOGETHER IN A STERILE MIXING BOWL, THE RESULTANT PASTE IS TO BE DIGITALLY PACKED INTO OPEN BONE VOID/GAP TO SET INSITU OR PLACED INTO THE MOULD PROVIDED, THE MIXTURE SETS TO FORM BEADS. THE BIODEGRADABLE, RADIOPAQUE BEADS ARE RESORBED IN APPROXIMATELY 30 – 60 DAYS WHEN USED IN ACCORDANCE WITH THE DEVICE LABELLING. STIMULAN® RAPID CURE IS MANUFACTURED FROM SYNTHETIC IMPLANT GRADE CALCIUM SULFATE DIHYDRATE(CASO4.2H2O) THAT RESORBS AND IS REPLACED WITH BONE DURING THE HEALING PROCESS. ALSO, AS THE BONE VOID FILLER BEADS ARE BIODEGRADABLE AND BIOCOMPATIBLE, THEY MAY BE USED AT AN INFECTED SITE.
Type: IMPLANTABLE DEVICE | Site: NIPPLE | Status: FUNCTIONAL
Brand: STIMULAN® RAPID CURE

## 2021-10-28 DEVICE — DEV CONTRL TISS STRATAFIX SPIRAL MNCRYL UD 3/0 PLS 60CM: Type: IMPLANTABLE DEVICE | Site: NIPPLE | Status: FUNCTIONAL

## 2021-10-28 RX ORDER — DEXAMETHASONE SODIUM PHOSPHATE 4 MG/ML
INJECTION, SOLUTION INTRA-ARTICULAR; INTRALESIONAL; INTRAMUSCULAR; INTRAVENOUS; SOFT TISSUE AS NEEDED
Status: DISCONTINUED | OUTPATIENT
Start: 2021-10-28 | End: 2021-10-28 | Stop reason: SURG

## 2021-10-28 RX ORDER — MAGNESIUM HYDROXIDE 1200 MG/15ML
LIQUID ORAL AS NEEDED
Status: DISCONTINUED | OUTPATIENT
Start: 2021-10-28 | End: 2021-10-28 | Stop reason: HOSPADM

## 2021-10-28 RX ORDER — DROPERIDOL 2.5 MG/ML
0.62 INJECTION, SOLUTION INTRAMUSCULAR; INTRAVENOUS ONCE AS NEEDED
Status: DISCONTINUED | OUTPATIENT
Start: 2021-10-28 | End: 2021-10-28 | Stop reason: HOSPADM

## 2021-10-28 RX ORDER — ONDANSETRON 2 MG/ML
4 INJECTION INTRAMUSCULAR; INTRAVENOUS ONCE AS NEEDED
Status: DISCONTINUED | OUTPATIENT
Start: 2021-10-28 | End: 2021-10-28 | Stop reason: HOSPADM

## 2021-10-28 RX ORDER — HYDRALAZINE HYDROCHLORIDE 20 MG/ML
5 INJECTION INTRAMUSCULAR; INTRAVENOUS
Status: DISCONTINUED | OUTPATIENT
Start: 2021-10-28 | End: 2021-10-28 | Stop reason: HOSPADM

## 2021-10-28 RX ORDER — CEFAZOLIN SODIUM 1 G/50ML
1 INJECTION, SOLUTION INTRAVENOUS ONCE
Status: COMPLETED | OUTPATIENT
Start: 2021-10-28 | End: 2021-10-28

## 2021-10-28 RX ORDER — NALOXONE HCL 0.4 MG/ML
0.4 VIAL (ML) INJECTION AS NEEDED
Status: DISCONTINUED | OUTPATIENT
Start: 2021-10-28 | End: 2021-10-28 | Stop reason: HOSPADM

## 2021-10-28 RX ORDER — DROPERIDOL 2.5 MG/ML
0.62 INJECTION, SOLUTION INTRAMUSCULAR; INTRAVENOUS AS NEEDED
Status: DISCONTINUED | OUTPATIENT
Start: 2021-10-28 | End: 2021-10-28 | Stop reason: HOSPADM

## 2021-10-28 RX ORDER — SODIUM CHLORIDE 9 MG/ML
INJECTION, SOLUTION INTRAVENOUS AS NEEDED
Status: DISCONTINUED | OUTPATIENT
Start: 2021-10-28 | End: 2021-10-28 | Stop reason: HOSPADM

## 2021-10-28 RX ORDER — PROMETHAZINE HYDROCHLORIDE 25 MG/1
25 TABLET ORAL ONCE AS NEEDED
Status: DISCONTINUED | OUTPATIENT
Start: 2021-10-28 | End: 2021-10-28 | Stop reason: HOSPADM

## 2021-10-28 RX ORDER — FAMOTIDINE 20 MG/1
20 TABLET, FILM COATED ORAL ONCE
Status: COMPLETED | OUTPATIENT
Start: 2021-10-28 | End: 2021-10-28

## 2021-10-28 RX ORDER — SODIUM CHLORIDE 0.9 % (FLUSH) 0.9 %
10 SYRINGE (ML) INJECTION EVERY 12 HOURS SCHEDULED
Status: DISCONTINUED | OUTPATIENT
Start: 2021-10-28 | End: 2021-10-28 | Stop reason: HOSPADM

## 2021-10-28 RX ORDER — HYDROCODONE BITARTRATE AND ACETAMINOPHEN 5; 325 MG/1; MG/1
TABLET ORAL
Status: COMPLETED
Start: 2021-10-28 | End: 2021-10-28

## 2021-10-28 RX ORDER — HYDROCODONE BITARTRATE AND ACETAMINOPHEN 5; 325 MG/1; MG/1
1 TABLET ORAL ONCE AS NEEDED
Status: DISCONTINUED | OUTPATIENT
Start: 2021-10-28 | End: 2021-10-28 | Stop reason: HOSPADM

## 2021-10-28 RX ORDER — SODIUM CHLORIDE 0.9 % (FLUSH) 0.9 %
10 SYRINGE (ML) INJECTION AS NEEDED
Status: DISCONTINUED | OUTPATIENT
Start: 2021-10-28 | End: 2021-10-28 | Stop reason: HOSPADM

## 2021-10-28 RX ORDER — SODIUM CHLORIDE 0.9 % (FLUSH) 0.9 %
3-10 SYRINGE (ML) INJECTION AS NEEDED
Status: DISCONTINUED | OUTPATIENT
Start: 2021-10-28 | End: 2021-10-28 | Stop reason: HOSPADM

## 2021-10-28 RX ORDER — SCOLOPAMINE TRANSDERMAL SYSTEM 1 MG/1
1 PATCH, EXTENDED RELEASE TRANSDERMAL ONCE
Status: DISCONTINUED | OUTPATIENT
Start: 2021-10-28 | End: 2021-10-28 | Stop reason: HOSPADM

## 2021-10-28 RX ORDER — FAMOTIDINE 10 MG/ML
20 INJECTION, SOLUTION INTRAVENOUS ONCE
Status: DISCONTINUED | OUTPATIENT
Start: 2021-10-28 | End: 2021-10-28 | Stop reason: HOSPADM

## 2021-10-28 RX ORDER — FENTANYL CITRATE 50 UG/ML
INJECTION, SOLUTION INTRAMUSCULAR; INTRAVENOUS AS NEEDED
Status: DISCONTINUED | OUTPATIENT
Start: 2021-10-28 | End: 2021-10-28 | Stop reason: SURG

## 2021-10-28 RX ORDER — PROPOFOL 10 MG/ML
VIAL (ML) INTRAVENOUS AS NEEDED
Status: DISCONTINUED | OUTPATIENT
Start: 2021-10-28 | End: 2021-10-28 | Stop reason: SURG

## 2021-10-28 RX ORDER — FENTANYL CITRATE 50 UG/ML
50 INJECTION, SOLUTION INTRAMUSCULAR; INTRAVENOUS
Status: DISCONTINUED | OUTPATIENT
Start: 2021-10-28 | End: 2021-10-28 | Stop reason: HOSPADM

## 2021-10-28 RX ORDER — IPRATROPIUM BROMIDE AND ALBUTEROL SULFATE 2.5; .5 MG/3ML; MG/3ML
3 SOLUTION RESPIRATORY (INHALATION) ONCE AS NEEDED
Status: DISCONTINUED | OUTPATIENT
Start: 2021-10-28 | End: 2021-10-28 | Stop reason: HOSPADM

## 2021-10-28 RX ORDER — MEPERIDINE HYDROCHLORIDE 25 MG/ML
12.5 INJECTION INTRAMUSCULAR; INTRAVENOUS; SUBCUTANEOUS
Status: DISCONTINUED | OUTPATIENT
Start: 2021-10-28 | End: 2021-10-28 | Stop reason: HOSPADM

## 2021-10-28 RX ORDER — HYDROMORPHONE HYDROCHLORIDE 1 MG/ML
0.5 INJECTION, SOLUTION INTRAMUSCULAR; INTRAVENOUS; SUBCUTANEOUS
Status: DISCONTINUED | OUTPATIENT
Start: 2021-10-28 | End: 2021-10-28 | Stop reason: HOSPADM

## 2021-10-28 RX ORDER — PROMETHAZINE HYDROCHLORIDE 25 MG/1
25 SUPPOSITORY RECTAL ONCE AS NEEDED
Status: DISCONTINUED | OUTPATIENT
Start: 2021-10-28 | End: 2021-10-28 | Stop reason: HOSPADM

## 2021-10-28 RX ORDER — MIDAZOLAM HYDROCHLORIDE 1 MG/ML
1 INJECTION INTRAMUSCULAR; INTRAVENOUS
Status: DISCONTINUED | OUTPATIENT
Start: 2021-10-28 | End: 2021-10-28 | Stop reason: HOSPADM

## 2021-10-28 RX ORDER — ONDANSETRON 2 MG/ML
INJECTION INTRAMUSCULAR; INTRAVENOUS AS NEEDED
Status: DISCONTINUED | OUTPATIENT
Start: 2021-10-28 | End: 2021-10-28 | Stop reason: SURG

## 2021-10-28 RX ORDER — SODIUM CHLORIDE 0.9 % (FLUSH) 0.9 %
3 SYRINGE (ML) INJECTION EVERY 12 HOURS SCHEDULED
Status: DISCONTINUED | OUTPATIENT
Start: 2021-10-28 | End: 2021-10-28 | Stop reason: HOSPADM

## 2021-10-28 RX ORDER — SODIUM CHLORIDE, SODIUM LACTATE, POTASSIUM CHLORIDE, CALCIUM CHLORIDE 600; 310; 30; 20 MG/100ML; MG/100ML; MG/100ML; MG/100ML
9 INJECTION, SOLUTION INTRAVENOUS CONTINUOUS
Status: DISCONTINUED | OUTPATIENT
Start: 2021-10-28 | End: 2021-10-28 | Stop reason: HOSPADM

## 2021-10-28 RX ORDER — LABETALOL HYDROCHLORIDE 5 MG/ML
5 INJECTION, SOLUTION INTRAVENOUS
Status: DISCONTINUED | OUTPATIENT
Start: 2021-10-28 | End: 2021-10-28 | Stop reason: HOSPADM

## 2021-10-28 RX ORDER — LIDOCAINE HYDROCHLORIDE 10 MG/ML
INJECTION, SOLUTION EPIDURAL; INFILTRATION; INTRACAUDAL; PERINEURAL AS NEEDED
Status: DISCONTINUED | OUTPATIENT
Start: 2021-10-28 | End: 2021-10-28 | Stop reason: SURG

## 2021-10-28 RX ORDER — FENTANYL CITRATE 50 UG/ML
INJECTION, SOLUTION INTRAMUSCULAR; INTRAVENOUS
Status: COMPLETED
Start: 2021-10-28 | End: 2021-10-28

## 2021-10-28 RX ORDER — LIDOCAINE HYDROCHLORIDE 10 MG/ML
0.5 INJECTION, SOLUTION EPIDURAL; INFILTRATION; INTRACAUDAL; PERINEURAL ONCE AS NEEDED
Status: COMPLETED | OUTPATIENT
Start: 2021-10-28 | End: 2021-10-28

## 2021-10-28 RX ADMIN — FENTANYL CITRATE 50 MCG: 50 INJECTION, SOLUTION INTRAMUSCULAR; INTRAVENOUS at 14:20

## 2021-10-28 RX ADMIN — CEFAZOLIN SODIUM 2 G: 1 INJECTION, SOLUTION INTRAVENOUS at 14:09

## 2021-10-28 RX ADMIN — ONDANSETRON 4 MG: 2 INJECTION INTRAMUSCULAR; INTRAVENOUS at 14:57

## 2021-10-28 RX ADMIN — HYDROCODONE BITARTRATE AND ACETAMINOPHEN 1 TABLET: 5; 325 TABLET ORAL at 17:57

## 2021-10-28 RX ADMIN — FENTANYL CITRATE 50 MCG: 50 INJECTION, SOLUTION INTRAMUSCULAR; INTRAVENOUS at 15:45

## 2021-10-28 RX ADMIN — SCOPALAMINE 1 PATCH: 1 PATCH, EXTENDED RELEASE TRANSDERMAL at 14:04

## 2021-10-28 RX ADMIN — FAMOTIDINE 20 MG: 20 TABLET ORAL at 14:04

## 2021-10-28 RX ADMIN — HYDROMORPHONE HYDROCHLORIDE 0.5 MG: 1 INJECTION, SOLUTION INTRAMUSCULAR; INTRAVENOUS; SUBCUTANEOUS at 16:38

## 2021-10-28 RX ADMIN — SODIUM CHLORIDE, POTASSIUM CHLORIDE, SODIUM LACTATE AND CALCIUM CHLORIDE 9 ML/HR: 600; 310; 30; 20 INJECTION, SOLUTION INTRAVENOUS at 13:38

## 2021-10-28 RX ADMIN — PROPOFOL 25 MCG/KG/MIN: 10 INJECTION, EMULSION INTRAVENOUS at 14:25

## 2021-10-28 RX ADMIN — LIDOCAINE HYDROCHLORIDE 0.5 ML: 10 INJECTION, SOLUTION EPIDURAL; INFILTRATION; INTRACAUDAL; PERINEURAL at 13:38

## 2021-10-28 RX ADMIN — FENTANYL CITRATE 50 MCG: 50 INJECTION, SOLUTION INTRAMUSCULAR; INTRAVENOUS at 14:38

## 2021-10-28 RX ADMIN — DEXAMETHASONE SODIUM PHOSPHATE 8 MG: 4 INJECTION, SOLUTION INTRA-ARTICULAR; INTRALESIONAL; INTRAMUSCULAR; INTRAVENOUS; SOFT TISSUE at 14:20

## 2021-10-28 RX ADMIN — LIDOCAINE HYDROCHLORIDE 50 MG: 10 INJECTION, SOLUTION EPIDURAL; INFILTRATION; INTRACAUDAL; PERINEURAL at 14:15

## 2021-10-28 RX ADMIN — FENTANYL CITRATE 50 MCG: 50 INJECTION, SOLUTION INTRAMUSCULAR; INTRAVENOUS at 15:58

## 2021-10-28 RX ADMIN — PROPOFOL 200 MG: 10 INJECTION, EMULSION INTRAVENOUS at 14:15

## 2021-10-28 NOTE — ANESTHESIA PREPROCEDURE EVALUATION
Anesthesia Evaluation     history of anesthetic complications: PONV               Airway   Mallampati: I  TM distance: >3 FB  Neck ROM: full  No difficulty expected  Dental      Pulmonary    Cardiovascular     (+) hypertension,       Neuro/Psych  (+) psychiatric history Anxiety,     GI/Hepatic/Renal/Endo      Musculoskeletal     Abdominal    Substance History      OB/GYN          Other   arthritis,    history of cancer                    Anesthesia Plan    ASA 2     general     intravenous induction     Anesthetic plan, all risks, benefits, and alternatives have been provided, discussed and informed consent has been obtained with: patient.    Plan discussed with CRNA.

## 2021-10-28 NOTE — ANESTHESIA PROCEDURE NOTES
Airway  Urgency: elective    Date/Time: 10/28/2021 2:16 PM  Airway not difficult    General Information and Staff    Patient location during procedure: OR  CRNA: Betsy Allen CRNA    Indications and Patient Condition  Indications for airway management: airway protection    Preoxygenated: yes  Mask difficulty assessment: 0 - not attempted    Final Airway Details  Final airway type: supraglottic airway      Successful airway: I-gel  Size 4    Number of attempts at approach: 1  Assessment: lips, teeth, and gum same as pre-op    Additional Comments  LMA placed without difficulty, ventilation with assist, equal breath sounds and symmetric chest rise and fall

## 2021-10-28 NOTE — ANESTHESIA POSTPROCEDURE EVALUATION
Patient: Sina Greenfield    Procedure Summary     Date: 10/28/21 Room / Location:  [facility]    Anesthesia Start: 1409 Anesthesia Stop:     Procedures:       DEBRIDEMENT LEFT BREAST NIPPLE (Left Breast)      COMPLEX CLOSURE LEFT (Left Breast) Diagnosis:       Acquired absence of breast and absent nipple, bilateral      Breast cancer, left (HCC)      Open wound of breast    Surgeons: Mic Hannah MD Provider: Henry Bain MD    Anesthesia Type: general ASA Status: 2          Anesthesia Type: general    Vitals  No vitals data found for the desired time range.          Post Anesthesia Care and Evaluation    Patient location during evaluation: PACU  Patient participation: waiting for patient participation  Level of consciousness: responsive to light touch  Airway patency: patent  Anesthetic complications: No anesthetic complications  PONV Status: NA  Cardiovascular status: hemodynamically stable and acceptable  Respiratory status: nonlabored ventilation, acceptable and nasal cannula  Hydration status: acceptable

## 2021-10-28 NOTE — PROGRESS NOTES
Patients  called to ask why they are having to wait 3 weeks for Oncotpye after surgery - Surgery was 10/13/21 - F/U with Dr LOPEZ 10/27/2021, Appointment with Dr Anguiano 10/27/2021. He could asked why they had to wait an extra week to get Oncotype ordered - I explained the [facility] approach and that Dr LOPEZ did not have clinic on 10/20/2021. He verbalized understanding.

## 2021-11-01 LAB
CYTO UR: NORMAL
LAB AP CASE REPORT: NORMAL
LAB AP CLINICAL INFORMATION: NORMAL
PATH REPORT.FINAL DX SPEC: NORMAL
PATH REPORT.GROSS SPEC: NORMAL

## 2021-11-05 LAB
CYTO UR: NORMAL
LAB AP CASE REPORT: NORMAL
LAB AP CLINICAL INFORMATION: NORMAL
LAB AP GENOMIC HEALTH, ADDENDUM: NORMAL
Lab: NORMAL
PATH REPORT.FINAL DX SPEC: NORMAL
PATH REPORT.GROSS SPEC: NORMAL

## 2021-11-15 ENCOUNTER — NURSE NAVIGATOR (OUTPATIENT)
Dept: OTHER | Facility: HOSPITAL | Age: 61
End: 2021-11-15

## 2021-11-15 NOTE — PROGRESS NOTES
Patients , Jose called to ask about Oncotype DX score- I reported result to him on 16 - Low Risk of Recurrence- I explained that Dr Morris would discuss these result and treatment recommendations at their appointment with him. He verbalized understanding

## 2021-11-18 ENCOUNTER — OFFICE VISIT (OUTPATIENT)
Dept: ONCOLOGY | Facility: CLINIC | Age: 61
End: 2021-11-18

## 2021-11-18 ENCOUNTER — LAB (OUTPATIENT)
Dept: LAB | Facility: HOSPITAL | Age: 61
End: 2021-11-18

## 2021-11-18 VITALS
RESPIRATION RATE: 12 BRPM | BODY MASS INDEX: 34.37 KG/M2 | HEART RATE: 76 BPM | TEMPERATURE: 98.2 F | DIASTOLIC BLOOD PRESSURE: 80 MMHG | OXYGEN SATURATION: 99 % | HEIGHT: 64 IN | SYSTOLIC BLOOD PRESSURE: 149 MMHG | WEIGHT: 201.3 LBS

## 2021-11-18 DIAGNOSIS — Z17.0 MALIGNANT NEOPLASM OF UPPER-OUTER QUADRANT OF LEFT BREAST IN FEMALE, ESTROGEN RECEPTOR POSITIVE (HCC): Primary | ICD-10-CM

## 2021-11-18 DIAGNOSIS — C50.412 MALIGNANT NEOPLASM OF UPPER-OUTER QUADRANT OF LEFT BREAST IN FEMALE, ESTROGEN RECEPTOR POSITIVE (HCC): ICD-10-CM

## 2021-11-18 DIAGNOSIS — C50.412 MALIGNANT NEOPLASM OF UPPER-OUTER QUADRANT OF LEFT BREAST IN FEMALE, ESTROGEN RECEPTOR POSITIVE (HCC): Primary | ICD-10-CM

## 2021-11-18 DIAGNOSIS — Z17.0 MALIGNANT NEOPLASM OF UPPER-OUTER QUADRANT OF LEFT BREAST IN FEMALE, ESTROGEN RECEPTOR POSITIVE (HCC): ICD-10-CM

## 2021-11-18 LAB
ALBUMIN SERPL-MCNC: 4.6 G/DL (ref 3.5–5.2)
ALBUMIN/GLOB SERPL: 1.5 G/DL
ALP SERPL-CCNC: 126 U/L (ref 39–117)
ALT SERPL W P-5'-P-CCNC: 27 U/L (ref 1–33)
ANION GAP SERPL CALCULATED.3IONS-SCNC: 11 MMOL/L (ref 5–15)
AST SERPL-CCNC: 24 U/L (ref 1–32)
BILIRUB SERPL-MCNC: 0.2 MG/DL (ref 0–1.2)
BUN SERPL-MCNC: 19 MG/DL (ref 6–20)
BUN/CREAT SERPL: 21.8 (ref 7–25)
CALCIUM SPEC-SCNC: 10.3 MG/DL (ref 8.6–10.5)
CHLORIDE SERPL-SCNC: 103 MMOL/L (ref 98–107)
CO2 SERPL-SCNC: 26 MMOL/L (ref 22–29)
CREAT SERPL-MCNC: 0.87 MG/DL (ref 0.57–1)
GFR SERPL CREATININE-BSD FRML MDRD: 67 ML/MIN/1.73
GLOBULIN UR ELPH-MCNC: 3.1 GM/DL
GLUCOSE SERPL-MCNC: 96 MG/DL (ref 65–99)
POTASSIUM SERPL-SCNC: 4.4 MMOL/L (ref 3.5–5.2)
PROT SERPL-MCNC: 7.7 G/DL (ref 6–8.5)
SODIUM SERPL-SCNC: 140 MMOL/L (ref 136–145)

## 2021-11-18 PROCEDURE — 36415 COLL VENOUS BLD VENIPUNCTURE: CPT

## 2021-11-18 PROCEDURE — 99215 OFFICE O/P EST HI 40 MIN: CPT | Performed by: INTERNAL MEDICINE

## 2021-11-18 PROCEDURE — 80053 COMPREHEN METABOLIC PANEL: CPT

## 2021-11-18 RX ORDER — ANASTROZOLE 1 MG/1
1 TABLET ORAL DAILY
Qty: 90 TABLET | Refills: 3 | Status: SHIPPED | OUTPATIENT
Start: 2021-11-18 | End: 2022-10-17

## 2021-11-18 RX ORDER — BUSPIRONE HYDROCHLORIDE 5 MG/1
TABLET ORAL
COMMUNITY
Start: 2021-11-08 | End: 2021-11-30

## 2021-11-18 NOTE — PROGRESS NOTES
CHIEF COMPLAINT: Hormone receptor positive low risk Oncotype breast cancer    Problem List:  Oncology/Hematology History Overview Note   1. [id number] ER+ [place]+ Her2 negative invasive ductal carcinoma of the left breast with low risk Oncotype score 16.  A) bilateral mastectomy on 10/14/21.  Pathology showed a 1.5 cm high grade IDC.  0/2 SLN involved.  2. Hypertension  3. Sarcoidosis diagnosed on mediastinoscopy never required treatment.  4. Anxiety  5. History of atypical cyst found in ovarian cyst shortly after birth of her daughter 21 years ago followed with serial exams.      Oncology history timeline:  -10/1/2021 CT chestShow 6 mm pleural-based nodule right lung base for which continued follow-up in 6 months is recommended.  Dr. Schmid states that the enlargement of the lymph nodes within the hilar regions and mediastinum can easily be related to in part of her known sarcoidosis.  PET canceled  -10/14/2021  Right breast prophylactic skin sparing mastectomy benign.  Left breast skin sparing nipple sparing mastectomy invasive ductal 1.5 cm carcinoma [place] grade 3 wide margins.  0 out of 2 left sentinel nodes involved.  [place] 8 out of 9.  No lymph vascular invasion.  ER 95% 3+  [place] 1-5% 3+  HER-2/will 5% 1+  Oncotype score 16.  4% distant recurrence risk at 9 years on hormone blockade alone.  Less than 1% adjuvant chemotherapeutic benefit.    -10/27/2021 consultation Dr. Krysten Anguiano: She discussedthe use of the Oncotype recurrence score.  This test provides information about the biology and risk of recurrence for ER positive Her2-negative breast cancers.  It is clear from previous studies that patients who have a low risk Oncotype do not benefit from adjuvant chemotherapy.  Conversely, patients with a high risk Oncotype can have a significant benefit from adjuvant chemotherapy.  Scores in the intermediate risk group may have a small benefit based on the patient's age.  We discussed that the Oncotype test  is most useful if chemotherapy is an option that the patient is considering.      Regardless of the decision about chemotherapy, she is a candidate for adjuvant endocrine therapy with an aromatase inhibitor.  We reviewed the potential side effects of anastrozole including hot flashes, vaginal dryness, joint pain, decrease in bone density, and mood or sleep disturbance.     She mentions that she has a trip to [place] planned in early February.  We discussed that if she does receive chemotherapy, this potentially could be ending shortly before her departure.     F/u 3 weeks to review oncotype result.    -10/28/2021 left nipple excision without neoplasm    -11/18/2021 [facility] medical oncology follow-up visit: Has her tissue expanders and beyond the discomfort there is doing well.  Alkaline phosphatase slightly elevated on baseline testing.  Talked about doing bone scanning but for now we will simply repeat the CMP prior to return to my nurse practitioner in 2 months for survivorship visit.  In the meantime we reviewed her Oncotype score which has a low risk Oncotype and no chemotherapy is recommended.  I would give her at least 5 years and preferably 7 of Arimidex.  Because she has had bilateral mastectomies, she needs no radiation.  For PTSD symptoms, we will refer her to [facility] and she will need OB/GYN evaluation which she is going to arrange given her history of ovarian abnormalities as outlined above.     Malignant neoplasm of upper-outer quadrant of left breast in female, estrogen receptor positive (HCC)   10/14/2021 Initial Diagnosis    Malignant neoplasm of upper-outer quadrant of left female breast (HCC)     10/27/2021 Cancer Staged    Staging form: Breast, AJCC 8th Edition  - Pathologic: Stage IA (pT1c, pN0, cM0, G3, ER+, WA+, HER2-) - Signed by Krysten Anguiano MD on 10/27/2021     11/3/2021 -  Other Event    Oncotype score 16 with 4% distant recurrence risk at 9 years on hormone blockade alone with less  "than 1% adjuvant benefit from chemotherapy.         HISTORY OF PRESENT ILLNESS:  The patient is a 61 y.o. female, here for follow up on management of hormone receptor positive HER-2 negative low risk Oncotype node-negative stage I breast cancer.    Past Medical History:   Diagnosis Date   • Anxiety    • Arthritis    • Cancer (HCC)     breast   • COVID-2021   • Hx of skin cancer, basal cell    • Hypertension    • Lung nodule    • Malignant neoplasm of upper-outer quadrant of left female breast (HCC) 10/14/2021   • PONV (postoperative nausea and vomiting)    • Sarcoidosis     10 years ago   • Wears reading eyeglasses      Past Surgical History:   Procedure Laterality Date   • BREAST ABSCESS INCISION AND DRAINAGE Left 10/28/2021    Procedure: DEBRIDEMENT LEFT BREAST NIPPLE;  Surgeon: Mic Hannah MD;  Location:  [facility];  Service: Plastics;  Laterality: Left;   • BREAST RECONSTRUCTION, BREAST TISSUE EXPANDER INSERTION Bilateral 10/14/2021    Procedure: IMMEDIATE BILATERAL BREAST RECONSTRUCTION WITH TISSUE EXPANDERS AND ALLODERM;  Surgeon: Mic Hannah MD;  Location:  [facility];  Service: Plastics;  Laterality: Bilateral;   • BREAST SURGERY Left 10/28/2021    Procedure: COMPLEX CLOSURE LEFT;  Surgeon: Mic Hannah MD;  Location:  [facility];  Service: Plastics;  Laterality: Left;   • CARPAL TUNNEL RELEASE Bilateral    •  SECTION      with ovaraian cyst removed and found \"borderline cells\" in the cyst followed and released 6 years ago   • COLONOSCOPY     • FOOT SURGERY Left     hardware   • KNEE CARTILAGE SURGERY Left    • LYMPH NODE BIOPSY         • MASTECTOMY W/ SENTINEL NODE BIOPSY Bilateral 10/14/2021    Procedure: SKIN SPARING MASTECTOMY, LEFT SENITNEL NODE BIOPSY, RIGHT PROPHYLACTIC SKIN SPARING MASTECTOMY;  Surgeon: Sylvester Mc MD;  Location:  [facility];  Service: General;  Laterality: Bilateral;   • TOTAL KNEE ARTHROPLASTY Left        No Known Allergies    Family " "History and Social History reviewed and changed as necessary    REVIEW OF SYSTEM:   Other than postmastectomy discomforts no somatic complaints.  Does have some anxiety and depression    PHYSICAL EXAM:  Not frankly anxious appearing and has normal affect.  No respiratory distress.    Vitals:    11/18/21 1246   BP: 149/80   Pulse: 76   Resp: 12   Temp: 98.2 °F (36.8 °C)   SpO2: 99%   Weight: 91.3 kg (201 lb 4.8 oz)   Height: 162.6 cm (64.02\")     Vitals:    11/18/21 1246   PainSc:   2     Pain being managed with heat therapy.    ECOG score: 1           Vitals reviewed.    ECOG: (1) Restricted in Physically Strenuous Activity, Ambulatory & Able to Do Work of Light Nature    Lab Results   Component Value Date    HGB 13.1 10/08/2021    HCT 40.4 10/08/2021    MCV 96.7 10/08/2021     10/08/2021    WBC 4.86 10/08/2021       Lab Results   Component Value Date    GLUCOSE 96 10/08/2021    GLUCOSE 96 10/08/2021    BUN 22 (H) 10/08/2021    BUN 22 (H) 10/08/2021    CREATININE 0.95 10/08/2021    CREATININE 0.95 10/08/2021     10/08/2021     10/08/2021    K 4.4 10/08/2021    K 4.4 10/08/2021     10/08/2021     10/08/2021    CO2 26.0 10/08/2021    CO2 26.0 10/08/2021    CALCIUM 9.9 10/08/2021    CALCIUM 9.9 10/08/2021    PROTEINTOT 6.9 10/08/2021    ALBUMIN 4.50 10/08/2021    BILITOT 0.3 10/08/2021    ALKPHOS 130 (H) 10/08/2021    AST 24 10/08/2021    ALT 27 10/08/2021             ASSESSMENT & PLAN:  1. [id number] ER+ [place]+ Her2 negative invasive ductal carcinoma of the left breast with low risk Oncotype score 16.  A) bilateral mastectomy on 10/14/21.  Pathology showed a 1.5 cm high grade IDC.  0/2 SLN involved.  2. Hypertension  3. Sarcoidosis diagnosed on mediastinoscopy never required treatment.  4. Anxiety  5. History of atypical cyst found in ovarian cyst shortly after birth of her daughter 21 years ago followed with serial exams.      Oncology history timeline:  -10/1/2021 CT chestShow 6 mm " pleural-based nodule right lung base for which continued follow-up in 6 months is recommended.  Dr. Schmid states that the enlargement of the lymph nodes within the hilar regions and mediastinum can easily be related to in part of her known sarcoidosis.  PET canceled  -10/14/2021  Right breast prophylactic skin sparing mastectomy benign.  Left breast skin sparing nipple sparing mastectomy invasive ductal 1.5 cm carcinoma [place] grade 3 wide margins.  0 out of 2 left sentinel nodes involved.  [place] 8 out of 9.  No lymph vascular invasion.  ER 95% 3+  [place] 1-5% 3+  HER-2/will 5% 1+  Oncotype score 16.  4% distant recurrence risk at 9 years on hormone blockade alone.  Less than 1% adjuvant chemotherapeutic benefit.    -10/27/2021 consultation Dr. Krysten Anguiano: She discussedthe use of the Oncotype recurrence score.  This test provides information about the biology and risk of recurrence for ER positive Her2-negative breast cancers.  It is clear from previous studies that patients who have a low risk Oncotype do not benefit from adjuvant chemotherapy.  Conversely, patients with a high risk Oncotype can have a significant benefit from adjuvant chemotherapy.  Scores in the intermediate risk group may have a small benefit based on the patient's age.  We discussed that the Oncotype test is most useful if chemotherapy is an option that the patient is considering.      Regardless of the decision about chemotherapy, she is a candidate for adjuvant endocrine therapy with an aromatase inhibitor.  We reviewed the potential side effects of anastrozole including hot flashes, vaginal dryness, joint pain, decrease in bone density, and mood or sleep disturbance.     She mentions that she has a trip to [place] planned in early February.  We discussed that if she does receive chemotherapy, this potentially could be ending shortly before her departure.     F/u 3 weeks to review oncotype result.    -10/28/2021 left nipple  excision without neoplasm    -11/18/2021 [facility] medical oncology follow-up visit: Has her tissue expanders and beyond the discomfort there is doing well.  Alkaline phosphatase slightly elevated on baseline testing.  Talked about doing bone scanning but for now we will simply repeat the CMP prior to return to my nurse practitioner in 2 months for survivorship visit.  In the meantime we reviewed her Oncotype score which has a low risk Oncotype and no chemotherapy is recommended.  I would give her at least 5 years and preferably 7 of Arimidex.  Because she has had bilateral mastectomies, she needs no radiation.  For PTSD symptoms, we will refer her to [facility] and she will need OB/GYN evaluation which she is going to arrange given her history of ovarian abnormalities as outlined above.  She is going to see pulmonary medicine because of her sarcoidosis.  I think the abnormality of the pleura seen on CT is likely related to her sarcoid and not likely breast metastasis but will get pulmonary opinion.    Total time of care today inclusive time spent today prior to her arrival reviewing and cataloging her past history as outlined above and during visit going over that as well as her sarcoid and scan results on CT and plans for management thereof and her history of ovarian cyst and management thereof and institution of Arimidex and potential side effects including hot flashes and osteoporosis and after visit arranging for all of this took 1 hour of total patient care time throughout the day today.  Jose Morris MD    11/18/2021

## 2021-11-29 ENCOUNTER — TELEPHONE (OUTPATIENT)
Dept: ONCOLOGY | Facility: CLINIC | Age: 61
End: 2021-11-29

## 2021-11-29 NOTE — TELEPHONE ENCOUNTER
Caller: PAT    Relationship: [facility]    Best call back number: [phone]    What is the best time to reach you: OFFICE HOURS    Who are you requesting to speak with (clinical staff, provider,  specific staff member): REFERRAL    What was the call regarding: OFFICE RECEIVED A PULMONARY REFERRAL FOR KENDALL CRUZ. OFFICE SENDING REFERRAL BACK TO OFFICE FOR CORRECTION.    Do you require a callback: YES

## 2021-11-30 ENCOUNTER — NURSE NAVIGATOR (OUTPATIENT)
Dept: ONCOLOGY | Facility: CLINIC | Age: 61
End: 2021-11-30

## 2021-11-30 ENCOUNTER — OFFICE VISIT (OUTPATIENT)
Dept: PULMONOLOGY | Facility: CLINIC | Age: 61
End: 2021-11-30

## 2021-11-30 VITALS
TEMPERATURE: 97 F | WEIGHT: 204 LBS | OXYGEN SATURATION: 99 % | SYSTOLIC BLOOD PRESSURE: 132 MMHG | HEIGHT: 64 IN | BODY MASS INDEX: 34.83 KG/M2 | HEART RATE: 68 BPM | DIASTOLIC BLOOD PRESSURE: 86 MMHG

## 2021-11-30 DIAGNOSIS — R91.1 LUNG NODULE: Primary | ICD-10-CM

## 2021-11-30 DIAGNOSIS — C50.412 MALIGNANT NEOPLASM OF UPPER-OUTER QUADRANT OF LEFT BREAST IN FEMALE, ESTROGEN RECEPTOR POSITIVE (HCC): ICD-10-CM

## 2021-11-30 DIAGNOSIS — Z17.0 MALIGNANT NEOPLASM OF UPPER-OUTER QUADRANT OF LEFT BREAST IN FEMALE, ESTROGEN RECEPTOR POSITIVE (HCC): ICD-10-CM

## 2021-11-30 DIAGNOSIS — D86.1 SARCOIDOSIS OF LYMPH NODES: ICD-10-CM

## 2021-11-30 DIAGNOSIS — D86.1 SARCOIDOSIS OF LYMPH NODES: Primary | ICD-10-CM

## 2021-11-30 PROCEDURE — 99205 OFFICE O/P NEW HI 60 MIN: CPT | Performed by: INTERNAL MEDICINE

## 2021-11-30 NOTE — PROGRESS NOTES
Met patient in lung nodule clinic with Dr. Davila. She has history of sarcoidosis and breast cancer. She is a lifetime non-smoker. She is here today to evaluate recent CT chest revealing subcarinal, paratracheal, and bilateral hilar adenopathy along with 6mm RLL nodule. She was previously followed at [facility] for sarcoid. She did have COVID in January 2021 with SOA which has since resolved. She denies unexplained weight loss or other new respiratory complaints. Scans reviewed. Per Dr. Davila obtain imaging/imaging reports from [facility]. 6mm nodule will need repeat CT chest x6mo unless present on prior imaging. Further decision will be based on previous scans done at outside facility. She v/u and agreeable to plan. Introduced lung navigator role and provided contact information. She knows to call with questions or concerns.

## 2021-11-30 NOTE — PROGRESS NOTES
New Patient Pulmonary Office Visit      Patient Name: Sina Greenfield    Referring Physician: No ref. provider found    Chief Complaint:    Chief Complaint   Patient presents with   • Sarcoidosis       History of Present Illness: Sina Greenfield is a 61 y.o. female who is here today to establish care with Pulmonary.  The patient has a past medical history significant for breast cancer with a T1 N0 M0, that underwent a bilateral mastectomy in October 2021.  She also has a history of sarcoidosis and anxiety.  Who underwent a CT scan of the chest as part of her cancer imaging that showed a 6 mm pleural-based nodule in the right lower lobe and also patient was found to have mediastinal and hilar lymphadenopathy.  She was sent to pulmonary for further evaluation.  The patient states that she was followed at [facility] for roughly 10 years for sarcoidosis although it is always been stable.  She was doing PFTs but has been many years since he had a CT scan of the chest.  States that her previous pulmonologist did not want to do CT scans on a regular basis.  She states she currently denies any chest pain, nausea, fever, chills.  She does have some shortness of breath that she had Covid back in January 2021 although this has been improved for significant amount of time now.  She has no other acute complaints.    Review of Systems:   Review of Systems   Constitutional: Negative for chills, fatigue and fever.   HENT: Negative for congestion and voice change.    Eyes: Negative for blurred vision.   Respiratory: Negative for cough, shortness of breath and wheezing.    Cardiovascular: Negative for chest pain.   Skin: Negative for dry skin.   Hematological: Negative for adenopathy.   Psychiatric/Behavioral: Negative for agitation and depressed mood.       Past Medical History:   Past Medical History:   Diagnosis Date   • Anxiety    • Arthritis    • Cancer (HCC)     breast   • COVID-19 Jan 2021   • Hx of skin cancer,  "basal cell    • Hypertension    • Lung nodule    • Malignant neoplasm of upper-outer quadrant of left female breast (HCC) 10/14/2021   • PONV (postoperative nausea and vomiting)    • Sarcoidosis     10 years ago   • Wears reading eyeglasses        Past Surgical History:   Past Surgical History:   Procedure Laterality Date   • BREAST ABSCESS INCISION AND DRAINAGE Left 10/28/2021    Procedure: DEBRIDEMENT LEFT BREAST NIPPLE;  Surgeon: Mic Hannah MD;  Location:  [facility];  Service: Plastics;  Laterality: Left;   • BREAST RECONSTRUCTION, BREAST TISSUE EXPANDER INSERTION Bilateral 10/14/2021    Procedure: IMMEDIATE BILATERAL BREAST RECONSTRUCTION WITH TISSUE EXPANDERS AND ALLODERM;  Surgeon: Mic Hannah MD;  Location:  [facility];  Service: Plastics;  Laterality: Bilateral;   • BREAST SURGERY Left 10/28/2021    Procedure: COMPLEX CLOSURE LEFT;  Surgeon: Mic Hannah MD;  Location:  [facility];  Service: Plastics;  Laterality: Left;   • CARPAL TUNNEL RELEASE Bilateral    •  SECTION      with ovaraian cyst removed and found \"borderline cells\" in the cyst followed and released 6 years ago   • COLONOSCOPY     • FOOT SURGERY Left     hardware   • KNEE CARTILAGE SURGERY Left    • LYMPH NODE BIOPSY      2010   • MASTECTOMY W/ SENTINEL NODE BIOPSY Bilateral 10/14/2021    Procedure: SKIN SPARING MASTECTOMY, LEFT SENITNEL NODE BIOPSY, RIGHT PROPHYLACTIC SKIN SPARING MASTECTOMY;  Surgeon: Sylvester Mc MD;  Location:  [facility];  Service: General;  Laterality: Bilateral;   • TOTAL KNEE ARTHROPLASTY Left        Family History: History reviewed. No pertinent family history.    Social History:   Social History     Socioeconomic History   • Marital status:    Tobacco Use   • Smoking status: Never Smoker   • Smokeless tobacco: Never Used   Vaping Use   • Vaping Use: Never used   Substance and Sexual Activity   • Alcohol use: Yes     Comment: one drink a week   • Drug use: Never   • Sexual activity: " "Defer       Medications:     Current Outpatient Medications:   •  ALPRAZolam (XANAX) 0.5 MG tablet, Take 0.5 mg by mouth Every 8 (Eight) Hours As Needed for Anxiety., Disp: , Rfl:   •  anastrozole (ARIMIDEX) 1 MG tablet, Take 1 tablet by mouth Daily., Disp: 90 tablet, Rfl: 3  •  Cyanocobalamin (Vitamin B-12) 3000 MCG sublingual tablet, Place  under the tongue., Disp: , Rfl:   •  losartan (COZAAR) 50 MG tablet, Take 50 mg by mouth Daily., Disp: , Rfl:   •  multivitamin with minerals (MULTIVITAMIN ADULT PO), Take 1 tablet by mouth Daily., Disp: , Rfl:   •  vitamin D3 125 MCG (5000 UT) capsule capsule, Take 5,000 Units by mouth Daily., Disp: , Rfl:     Allergies:   No Known Allergies    Physical Exam:  Vital Signs:   Vitals:    11/30/21 1133   BP: 132/86   Pulse: 68   Temp: 97 °F (36.1 °C)   SpO2: 99%  Comment: resting, room air   Weight: 92.5 kg (204 lb)   Height: 162.6 cm (64.02\")       Physical Exam  Vitals and nursing note reviewed.   Constitutional:       General: She is not in acute distress.     Appearance: She is well-developed and normal weight. She is not ill-appearing or toxic-appearing.   HENT:      Head: Normocephalic and atraumatic.   Cardiovascular:      Rate and Rhythm: Normal rate and regular rhythm.      Pulses: Normal pulses.      Heart sounds: Normal heart sounds. No murmur heard.  No friction rub. No gallop.    Pulmonary:      Effort: Pulmonary effort is normal. No respiratory distress.      Breath sounds: Normal breath sounds. No wheezing, rhonchi or rales.   Musculoskeletal:      Right lower leg: No edema.      Left lower leg: No edema.   Skin:     General: Skin is warm and dry.   Neurological:      Mental Status: She is alert and oriented to person, place, and time.         Immunization History   Administered Date(s) Administered   • COVID-19 (PFIZER) 03/17/2021, 04/07/2021, 11/10/2021   • Flu Vaccine Split Quad 10/04/2021   • [place] Quad =>18yrs 10/06/2020       Results Review:   - I " personally reviewed the pts imaging from CT of the chest from October 1, 2021 showed a 6 mm nodule in the right lower lobe that was noncalcified with some bilateral mediastinal and hilar lymphadenopathy.  - I personally reviewed the pts chart with regards to evaluation by oncology.  -I also reviewed the records that she brought in from her previous pulmonologist Dr. Johnson at [facility] regarding her sarcoid diagnosis from 2009.  I have scanned these into the chart.    Assessment / Plan:   1. 6 mm noncalcified right lower lobe nodule (Primary)  2. Sarcoidosis of lymph nodes  3. Malignant neoplasm of upper-outer quadrant of left breast in female, estrogen receptor positive (HCC)  -Explained to patient that we have 2 separate issues that we are currently dealing with.  The 6 mm right lower lobe noncalcified nodule is very likely benign and that could be secondary to sarcoidosis but given her breast cancer I still would recommend following this nodule to ensure it does not enlarge.  Explained that for the nodule itself I would recommend doing a CT scan in 6 months from now.  With regards to the lymphadenopathy.  We discussed that I think this is very likely secondary to sarcoidosis although given that she has had breast cancer now I could not confirm that without a biopsy.    - So one option that she could have is undergoing an EBUS with a needle biopsy of the lymph nodes to confirm that this is sarcoidosis.  -Another option that she would have is to try to obtain the records from [facility] and see if her CT scan has always shown lymph nodes that are roughly the same size.  If the old imaging showed that the lymph nodes have all remained stable then no further follow-up regarding lymph nodes would be required.  After conversations about about options she chose to try and get the old records which I have asked her to drop by the office and then I will also try to get a myself.  Once I have seen those I will review  them and we will determine if any further imaging is required regarding the lymph nodes or biopsy.  If all the records are not obtained or the information is adequate.  There is still the CT scan that we are planning for in 6 months with a lung nodule that would give us another look at the lymph nodes as well.  -I did inform her that I do not think a PET scan would provide us with any information the lung nodule is too small for PET to give us any information and sarcoid is generally positive on PET scan so I positive PET scan would not differentiate cancer versus sarcoid and would still lead to her needing a biopsy.    Level of service justified based on 62 minutes spent in patient care on this date of service including, but not limited to: preparing to see the patient, obtaining and/or reviewing history, performing medically appropriate examination, ordering tests/medicine/procedures, independently interpreting results, documenting clinical information in EHR, and counseling/education of patient/family/caregiver. (Level 4 45-59 minutes; Level 5 60-74 minutes)    Follow Up:   Return in about 6 months (around 5/30/2022) for CT Chest with next visit.     CHRISTINE Davila, DO  Pulmonary and Critical Care Medicine  Note Electronically Signed    Please note that portions of this note may have been completed with a voice recognition program. Efforts were made to edit the dictations, but occasionally words are mistranscribed.

## 2021-12-03 DIAGNOSIS — Z00.6 EXAMINATION FOR NORMAL COMPARISON OR CONTROL IN CLINICAL RESEARCH: Primary | ICD-10-CM

## 2022-01-11 ENCOUNTER — CLINICAL SUPPORT (OUTPATIENT)
Dept: ONCOLOGY | Facility: CLINIC | Age: 62
End: 2022-01-11

## 2022-01-11 ENCOUNTER — OFFICE VISIT (OUTPATIENT)
Dept: PSYCHIATRY | Facility: CLINIC | Age: 62
End: 2022-01-11

## 2022-01-11 VITALS
HEIGHT: 64 IN | BODY MASS INDEX: 35.68 KG/M2 | WEIGHT: 209 LBS | OXYGEN SATURATION: 98 % | DIASTOLIC BLOOD PRESSURE: 73 MMHG | HEART RATE: 87 BPM | TEMPERATURE: 97.1 F | RESPIRATION RATE: 18 BRPM | SYSTOLIC BLOOD PRESSURE: 148 MMHG

## 2022-01-11 DIAGNOSIS — Z17.0 MALIGNANT NEOPLASM OF UPPER-OUTER QUADRANT OF LEFT BREAST IN FEMALE, ESTROGEN RECEPTOR POSITIVE: Primary | ICD-10-CM

## 2022-01-11 DIAGNOSIS — C50.412 MALIGNANT NEOPLASM OF UPPER-OUTER QUADRANT OF LEFT BREAST IN FEMALE, ESTROGEN RECEPTOR POSITIVE: Primary | ICD-10-CM

## 2022-01-11 DIAGNOSIS — F41.1 GAD (GENERALIZED ANXIETY DISORDER): Primary | ICD-10-CM

## 2022-01-11 DIAGNOSIS — R74.8 ELEVATED ALKALINE PHOSPHATASE LEVEL: ICD-10-CM

## 2022-01-11 DIAGNOSIS — G47.9 SLEEP DISTURBANCE: ICD-10-CM

## 2022-01-11 PROCEDURE — 99214 OFFICE O/P EST MOD 30 MIN: CPT | Performed by: NURSE PRACTITIONER

## 2022-01-11 PROCEDURE — 90792 PSYCH DIAG EVAL W/MED SRVCS: CPT | Performed by: NURSE PRACTITIONER

## 2022-01-11 NOTE — PROGRESS NOTES
Subjective   Sina Greenfield is a 61 y.o. female who is here today for initial appointment face to face with covid precautions taken with screening, distance, masked and good handwashing.Patient was referred by: Dr Morris.   Patient was diagnosed with breast cancer and is s/p bilateral mastectomies by Dr. LOPEZ and initiation of breast reconstruction by Dr. Hannah. Patient is also see by Dr. [facility]. She is currently on anastrozole for ER + [place] + HER2 - breast cancer treatment. Patient lives in [place], [place] with her . Last worked in 2019 when they moved to [place] from [place], [place].     Chief Complaint:  anxiety, sleep disturbance     History of Present Illness The patient reports the following symptoms of generalized anxiety: constant anxiety/worry, restlessness/on edge, difficulty concentrating, mind goes blank, irritability, muscle tension and sleep disturbance. The symptoms have been present for at least 12+ weeks and have caused impairment in important areas of functioning. She reports sleep poor initiation prior to taking alprazolam 0.25mg at bedtime and then gets about 7 hours of sleep. She doesn't take alprazolam during the day. Denies depression, denies OCD, amalia , SI/HI or AVH. Rare social use of alcohol,denies illicit drugs including cannabis, denies tobacco use. Patient reports more concerns about fear of recurrence, still feeling  anxious about cancer, still having to have reconstructive surgery ahead of her. She reports good support from her  and daughter. She reports siblings in area and has great emotionally support. She is beginning weight watchers because she wants to focus on her health better.     The following portions of the patient's history were reviewed and updated as appropriate: allergies, current medications, past family history, past medical history, past social history, past surgical history and problem list.      Past Psych History: history of anxiety  "\"surrounding my health since around 40yo\". She has not been on medication management but has worked with therapists for years. She has one now in [place] whom she is working with.     Substance Abuse:   Denies all     ABUSE HX: denies   LEGAL HX: denies     AUBREY REVIEWED: no red flags        Family Psychiatric History:  family history is not on file.      Social History:  Raised by her parents in [place], [place] and has one brother and two sisters. She went to [place] after college and worked, met her  and . She reports they have a daughter who is 20yo and a senior in college in [place]. Patient has a master's degree and when they chose to move back to [place] in 2019. Her dad  in his 90s in 2016. Her mother  of Covid at 91yo January of this year. Patient is working on her meaning in life and priorities.       Medical/Surgical History:  Past Medical History:   Diagnosis Date   • Anxiety    • Arthritis    • Cancer (HCC)     breast   • COVID-2021   • Hx of skin cancer, basal cell    • Hypertension    • Lung nodule    • Malignant neoplasm of upper-outer quadrant of left female breast (HCC) 10/14/2021   • PONV (postoperative nausea and vomiting)    • Sarcoidosis     10 years ago   • Wears reading eyeglasses      Past Surgical History:   Procedure Laterality Date   • BREAST ABSCESS INCISION AND DRAINAGE Left 10/28/2021    Procedure: DEBRIDEMENT LEFT BREAST NIPPLE;  Surgeon: Mic Hannah MD;  Location: [facility];  Service: Plastics;  Laterality: Left;   • BREAST RECONSTRUCTION, BREAST TISSUE EXPANDER INSERTION Bilateral 10/14/2021    Procedure: IMMEDIATE BILATERAL BREAST RECONSTRUCTION WITH TISSUE EXPANDERS AND ALLODERM;  Surgeon: Mic Hannah MD;  Location: [facility] OR;  Service: Plastics;  Laterality: Bilateral;   • BREAST SURGERY Left 10/28/2021    Procedure: COMPLEX CLOSURE LEFT;  Surgeon: Mic Hannah MD;  Location: [facility] OR;  Service: Plastics;  Laterality: " "Left;   • CARPAL TUNNEL RELEASE Bilateral    •  SECTION      with ovaraian cyst removed and found \"borderline cells\" in the cyst followed and released 6 years ago   • COLONOSCOPY     • FOOT SURGERY Left     hardware   • KNEE CARTILAGE SURGERY Left    • LYMPH NODE BIOPSY         • MASTECTOMY W/ SENTINEL NODE BIOPSY Bilateral 10/14/2021    Procedure: SKIN SPARING MASTECTOMY, LEFT SENITNEL NODE BIOPSY, RIGHT PROPHYLACTIC SKIN SPARING MASTECTOMY;  Surgeon: Sylvester Mc MD;  Location: [facility];  Service: General;  Laterality: Bilateral;   • TOTAL KNEE ARTHROPLASTY Left        No Known Allergies    Current Medications:   Current Outpatient Medications   Medication Sig Dispense Refill   • ALPRAZolam (XANAX) 0.5 MG tablet Take 0.5 mg by mouth Every 8 (Eight) Hours As Needed for Anxiety.     • anastrozole (ARIMIDEX) 1 MG tablet Take 1 tablet by mouth Daily. 90 tablet 3   • Cyanocobalamin (Vitamin B-12) 3000 MCG sublingual tablet Place  under the tongue.     • losartan (COZAAR) 50 MG tablet Take 50 mg by mouth Daily.     • multivitamin with minerals (MULTIVITAMIN ADULT PO) Take 1 tablet by mouth Daily.     • vitamin D3 125 MCG (5000 UT) capsule capsule Take 5,000 Units by mouth Daily.       No current facility-administered medications for this visit.       Lab Results: reviewed most recent         Review of Systems Constitutional: Negative for appetite change, chills, diaphoresis, fatigue, fever and unexpected weight change.   HENT: Negative for hearing loss, sore throat, trouble swallowing and voice change.    Eyes: Negative for photophobia and visual disturbance.   Respiratory: Negative for cough, chest tightness and shortness of breath.    Cardiovascular: Negative for chest pain and palpitations.   Gastrointestinal: Negative for abdominal pain, constipation, nausea and vomiting.   Endocrine: Negative for cold intolerance and heat intolerance.   Genitourinary: Negative for dysuria and frequency. "   Musculoskeletal: Negative for arthralgia, back pain, joint swelling and neck stiffness.   Skin: Negative for color change and wound.   Allergic/Immunologic: Negative for environmental allergies and immunocompromised state.   Neurological: Negative for dizziness, tremors, seizures, syncope, weakness, light-headedness and headaches.   Hematological: Negative for adenopathy. Does not bruise/bleed easily.    Objective   Physical Exam  not currently breastfeeding.    ALEXIS-7:    Over the last two weeks, how often have you been bothered by the following problems?  Feeling nervous, anxious or on edge: More than half the days  Not being able to stop or control worrying: More than half the days  Worrying too much about different things: More than half the days  Trouble Relaxing: Several days  Being so restless that it is hard to sit still: Not at all  Becoming easily annoyed or irritable: Several days  Feeling afraid as if something awful might happen: Several days  ALEXIS 7 Total Score: 9  If you checked any problems, how difficult have these problems made it for you to do your work, take care of things at home, or get along with other people: Somewhat difficult  0-4: Minimal anxiety  5-9: Mild anxiety  10-14: Moderate anxiety  15-21: Severe anxiety    PHQ-9:  PHQ-2/PHQ-9 Depression Screening 11/18/2021   Little interest or pleasure in doing things 0   Feeling down, depressed, or hopeless 0   Total Score 0      5-9: Minimal symptoms  10-14: Major depression mild  15-19: Major depression moderate  Greater then 20: Major depression severe      Mental Status Exam:   Appearance: appropriate  Hygiene:   good  Cooperation:  Cooperative  Eye Contact:  Good  Psychomotor Behavior:  Appropriate  Mood:  anxious  Affect:  Appropriate  Hopelessness: Denies  Speech:  Normal  Thought Process:  Linear  Thought Content:  Normal  Suicidal:  None  Homicidal:  None  Hallucinations:  None  Delusion:  None  Memory:  Intact  Orientation:  Person,  Place, Time and Situation  Reliability:  good  Insight:  Good  Judgement:  Good  Impulse Control:  Good        Short-term goals: Patient will be compliant with clinic appointments.  Patient will be engaged in therapy, medication compliant with minimal side effects. Patient  will report decrease of symptoms and frequency.    Long-term goals: Patient will have minimal symptoms of mental health disorder with continued treatment. Patient will be compliant with treatment and appointments.       Problem list: ALEXIS, sleep disturbance   Strengths: patient appears motivated for treatment          Assessment/Plan   General anxiety disorder     Sleep disturbance     A psychological evaluation was conducted in order to assess past and current level of functioning. Areas assessed included, but were not limited to: perception of social support, perception of ability to face and deal with challenges in life (positive functioning), anxiety symptoms, depressive symptoms, perspective on beliefs/belief system, coping skills for stress, intelligence level,  Therapeutic rapport was established. Interventions conducted today were geared towards incorporating medication management along with support for continued therapy. Education was also provided as to the med management with this provider and what to expect in subsequent sessions.    Assisted patient in processing above session content; acknowledged and normalized patient’s thoughts, feelings, and concerns.  Applied  positive coping skills and behavior management in session. Allowed patient to freely discuss issues without interruption or judgment. Provided safe, confidential environment to facilitate the development of positive therapeutic relationship and encourage open, honest communication. Assisted patient in identifying risk factors which would indicate the need for higher level of care including thoughts to harm self or others and/or self-harming behavior and encouraged patient  to contact this office, call 911, or present to the nearest emergency room should any of these events occur. Discussed crisis intervention services and means to access.  Patient adamantly and convincingly denies current suicidal or homicidal ideation or perceptual disturbance.    Discussed diagnosis and recommendations for treatment:    Cont to work with your THERAPIST TO PROVIDE: Cognitive Behavioral Therapy and Solution Focused Therapy to improve functioning, maintain stability, and avoid decompensation and the need for higher level of care.    MEDICATION MANAGEMENT RECOMMENDATIONS: no change in medication management at this time      We discussed risks, benefits,goals and side effects of the above medication and the patient was agreeable with the plan.Patient was educated on the importance of compliance with treatment and follow-up appointments.To call for questions or concerns and return early if necessary. Crisis plan reviewed including going to the Emergency department.       Treatment Plan: stabilize mood,  patient will stay out of the hospital and be at optimal level of functioning, take all medication as prescribed. Patient verbalized  understanding and agreement to plan.      Return if symptoms worsen or fail to improve.

## 2022-01-11 NOTE — PROGRESS NOTES
MEDICAL ONCOLOGY CANCER SURVIVORSHIP VISIT    Sina Greenfield  [id number]  1960    Chief Complaint:  Breast cancer      History of present illness:  Sina Greenfield is a 61 y.o. year old female who is here today for the Cancer Survivorship visit, see oncology history below. Sina has been doing well since we saw her last.  She is tolerating Arimidex with no unusual side effects.  She is scheduled for completion of her breast reconstruction next month with exchange of her expanders for implants.  She reports she has made an appointment with gynecologist to establish care.  She has also seen pulmonology regarding her history of sarcoidosis.  Currently no new concerns.  She reports that she had labs recently with her PCP and thinks they did a chemistry.  She reports that her anxiety over time seems to be leveling out, she does have an appointment today with ROBY Acevedo.     Cancer History:   Oncology/Hematology History Overview Note   1. [id number] ER+ [place]+ Her2 negative invasive ductal carcinoma of the left breast with low risk Oncotype score 16.  A) bilateral mastectomy on 10/14/21.  Pathology showed a 1.5 cm high grade IDC.  0/2 SLN involved.  2. Hypertension  3. Sarcoidosis diagnosed on mediastinoscopy never required treatment.  4. Anxiety  5. History of atypical cyst found in ovarian cyst shortly after birth of her daughter 21 years ago followed with serial exams.      Oncology history timeline:  -10/1/2021 CT chestShow 6 mm pleural-based nodule right lung base for which continued follow-up in 6 months is recommended.  Dr. Schmid states that the enlargement of the lymph nodes within the hilar regions and mediastinum can easily be related to in part of her known sarcoidosis.  PET canceled  -10/14/2021  Right breast prophylactic skin sparing mastectomy benign.  Left breast skin sparing nipple sparing mastectomy invasive ductal 1.5 cm carcinoma [place] grade 3 wide margins.  0 out of 2 left sentinel  nodes involved.  [place] 8 out of 9.  No lymph vascular invasion.  ER 95% 3+  GA 1-5% 3+  HER-2/will 5% 1+  Oncotype score 16.  4% distant recurrence risk at 9 years on hormone blockade alone.  Less than 1% adjuvant chemotherapeutic benefit.    -10/27/2021 consultation Dr. Krysten Anguiano: She discussedthe use of the Oncotype recurrence score.  This test provides information about the biology and risk of recurrence for ER positive Her2-negative breast cancers.  It is clear from previous studies that patients who have a low risk Oncotype do not benefit from adjuvant chemotherapy.  Conversely, patients with a high risk Oncotype can have a significant benefit from adjuvant chemotherapy.  Scores in the intermediate risk group may have a small benefit based on the patient's age.  We discussed that the Oncotype test is most useful if chemotherapy is an option that the patient is considering.      Regardless of the decision about chemotherapy, she is a candidate for adjuvant endocrine therapy with an aromatase inhibitor.  We reviewed the potential side effects of anastrozole including hot flashes, vaginal dryness, joint pain, decrease in bone density, and mood or sleep disturbance.     She mentions that she has a trip to [place] planned in early February.  We discussed that if she does receive chemotherapy, this potentially could be ending shortly before her departure.     F/u 3 weeks to review oncotype result.    -10/28/2021 left nipple excision without neoplasm    -11/18/2021 [facility] medical oncology follow-up visit: Has her tissue expanders and beyond the discomfort there is doing well.  Alkaline phosphatase slightly elevated on baseline testing.  Talked about doing bone scanning but for now we will simply repeat the CMP prior to return to my nurse practitioner in 2 months for survivorship visit.  In the meantime we reviewed her Oncotype score which has a low risk Oncotype and no chemotherapy is recommended.  I would  give her at least 5 years and preferably 7 of Arimidex.  Because she has had bilateral mastectomies, she needs no radiation.  For PTSD symptoms, we will refer her to [facility] and she will need OB/GYN evaluation which she is going to arrange given her history of ovarian abnormalities as outlined above.  She is going to see pulmonary medicine because of her sarcoidosis.  I think the abnormality of the pleura seen on CT is likely related to her sarcoid and not likely breast metastasis but will get pulmonary opinion.     Malignant neoplasm of upper-outer quadrant of left breast in female, estrogen receptor positive (HCC)   10/14/2021 Initial Diagnosis    Malignant neoplasm of upper-outer quadrant of left female breast (HCC)     10/27/2021 Cancer Staged    Staging form: Breast, AJCC 8th Edition  - Pathologic: Stage IA (pT1c, pN0, cM0, G3, ER+, OH+, HER2-) - Signed by Krysten Anguiano MD on 10/27/2021     11/3/2021 -  Other Event    Oncotype score 16 with 4% distant recurrence risk at 9 years on hormone blockade alone with less than 1% adjuvant benefit from chemotherapy.         Past Medical History:   Diagnosis Date   • Anxiety    • Arthritis    • Cancer (HCC)     breast   • COVID-19 Jan 2021   • Hx of skin cancer, basal cell    • Hypertension    • Lung nodule    • Malignant neoplasm of upper-outer quadrant of left female breast (HCC) 10/14/2021   • PONV (postoperative nausea and vomiting)    • Sarcoidosis     10 years ago   • Wears reading eyeglasses        Past Surgical History:   Procedure Laterality Date   • BREAST ABSCESS INCISION AND DRAINAGE Left 10/28/2021    Procedure: DEBRIDEMENT LEFT BREAST NIPPLE;  Surgeon: Mic Hannah MD;  Location: [facility] OR;  Service: Plastics;  Laterality: Left;   • BREAST RECONSTRUCTION, BREAST TISSUE EXPANDER INSERTION Bilateral 10/14/2021    Procedure: IMMEDIATE BILATERAL BREAST RECONSTRUCTION WITH TISSUE EXPANDERS AND ALLODERM;  Surgeon: Mic Hannah MD;  Location:   "[facility] OR;  Service: Plastics;  Laterality: Bilateral;   • BREAST SURGERY Left 10/28/2021    Procedure: COMPLEX CLOSURE LEFT;  Surgeon: iMc Hannah MD;  Location:  [facility] OR;  Service: Plastics;  Laterality: Left;   • CARPAL TUNNEL RELEASE Bilateral    •  SECTION      with ovaraian cyst removed and found \"borderline cells\" in the cyst followed and released 6 years ago   • COLONOSCOPY     • FOOT SURGERY Left     hardware   • KNEE CARTILAGE SURGERY Left    • LYMPH NODE BIOPSY         • MASTECTOMY W/ SENTINEL NODE BIOPSY Bilateral 10/14/2021    Procedure: SKIN SPARING MASTECTOMY, LEFT SENITNEL NODE BIOPSY, RIGHT PROPHYLACTIC SKIN SPARING MASTECTOMY;  Surgeon: Sylvester Mc MD;  Location:  [facility] OR;  Service: General;  Laterality: Bilateral;   • TOTAL KNEE ARTHROPLASTY Left        MEDICATIONS: The current medication list was reviewed and reconciled.     Allergies:  has No Known Allergies.    History reviewed. No pertinent family history.      Review of Systems    Negative for new concerns    Physical Exam  Vital Signs: /73   Pulse 87   Temp 97.1 °F (36.2 °C)   Resp 18   Ht 162.6 cm (64\")   Wt 94.8 kg (209 lb)   LMP  (LMP Unknown)   SpO2 98%   BMI 35.87 kg/m²    General Appearance:  alert, cooperative, no apparent distress and appears stated age   Neurologic/Psychiatric: A&O x 3, gait steady, appropriate affect   HEENT:  Normocephalic, without obvious abnormality, mucous membranes moist   Neck: Supple, symmetrical, trachea midline, no adenopathy;  No thyromegaly, masses, or tenderness       Lungs:   Respirations regular, even, and unlabored bilaterally       Abdomen:   Soft, non-tender and non-distended   Lymph nodes: No cervical or supraclavicular adenopathy noted   Extremities: Normal, atraumatic; no clubbing, cyanosis, or edema      ECOG Performance Status: (0) Fully Active - Able to Carry On All Pre-disease Performance Without Restriction        Assessment and Plan:  Diagnoses and " all orders for this visit:    1. Malignant neoplasm of upper-outer quadrant of left breast in female, estrogen receptor positive (HCC) (Primary)  -     Cancel: Comprehensive Metabolic Panel; Future    2. Elevated alkaline phosphatase level  -     Cancel: Comprehensive Metabolic Panel; Future    3.  Anxiety over health  4.  History of sarcoidosis    Discussion:    The patient and I have reviewed [facility] personal Survivorship Care Plan in detail. We discussed diagnosis, pathology, histology, all treatments, and ongoing surveillance recommendations. All questions were answered to her satisfaction. The patient is in agreement with our plan for ongoing surveillance as outlined in the plan. A copy of this document was provided at the completion of our visit.  A copy has also been sent to the patient's primary care provider.    I have reached out to her primary care provider's office to get a copy of her most recent labs to review her CMP.  If a CMP was not done she will stop and have it done next week, we are following up on previous CMP with mild elevation of her alkaline phosphatase.  I discussed with her today that typically this is benign, I will call her with the results.    She has an appointment today to establish care with ROBY Acevedo for counseling regarding her anxiety.  She does feel that her anxiety has been lessening over time.  2021 was a particularly difficult year with the death of her mother, diagnosis of breast cancer and a foot fracture along with current pandemic.    She has had bilateral mastectomies and reconstruction, she is scheduled next month for exchange of her expanders.  She also has close follow-up with Dr. Mc.  She is tolerating Arimidex with no unusual side effects, we will plan on at least 5 years and preferably 7 years of adjuvant therapy with Arimidex.  She will need periodic bone density testing, she is going to check with Dr. Jeffrey to have this done.    She is following  with pulmonology regarding her history of sarcoidosis.  We discussed today that there are no plans for routine imaging or lab work in the absence of new symptoms.    Return to clinic in 6 months for ongoing cancer surveillance.    I spent 30 minutes caring for Sina on this date of service. This time includes time spent by me in the following activities: preparing for the visit, reviewing tests, obtaining and/or reviewing a separately obtained history, performing a medically appropriate examination and/or evaluation, counseling and educating the patient/family/caregiver, referring and communicating with other health care professionals and documenting information in the medical record.     Cherrie Claudio, APRN  01/11/2022    Addendum: I did receive a copy of her CMP from 12/14/2021 which was normal, alkaline phosphatase was 92.  I called and notified Sina, she was pleased to hear of the normal result.

## 2022-02-04 ENCOUNTER — TELEPHONE (OUTPATIENT)
Dept: ONCOLOGY | Facility: CLINIC | Age: 62
End: 2022-02-04

## 2022-02-04 NOTE — TELEPHONE ENCOUNTER
Caller: Sina Greenfield    Relationship: Self    Best call back number: [phone]    What is the best time to reach you: ASAP    Who are you requesting to speak with (clinical staff, provider,  specific staff member): NURSE    Do you know the name of the person who called:     What was the call regarding: PT WANTS TO KNOW HOW LONG BEFORE SURGERY TO STOP ESTROGEN BLOCKER    Do you require a callback: YES

## 2022-02-04 NOTE — TELEPHONE ENCOUNTER
Called and informed patient that this would be her surgeons decision. She verbalized understanding.

## 2022-02-22 ENCOUNTER — PRE-ADMISSION TESTING (OUTPATIENT)
Dept: PREADMISSION TESTING | Facility: HOSPITAL | Age: 62
End: 2022-02-22

## 2022-02-22 VITALS — BODY MASS INDEX: 35.83 KG/M2 | HEIGHT: 64 IN | WEIGHT: 209.88 LBS

## 2022-02-22 LAB
ANION GAP SERPL CALCULATED.3IONS-SCNC: 11 MMOL/L (ref 5–15)
BUN SERPL-MCNC: 20 MG/DL (ref 6–20)
BUN/CREAT SERPL: 24.7 (ref 7–25)
CALCIUM SPEC-SCNC: 10.1 MG/DL (ref 8.6–10.5)
CHLORIDE SERPL-SCNC: 101 MMOL/L (ref 98–107)
CO2 SERPL-SCNC: 29 MMOL/L (ref 22–29)
CREAT SERPL-MCNC: 0.81 MG/DL (ref 0.57–1)
DEPRECATED RDW RBC AUTO: 43.5 FL (ref 37–54)
ERYTHROCYTE [DISTWIDTH] IN BLOOD BY AUTOMATED COUNT: 12.5 % (ref 12.3–15.4)
GFR SERPL CREATININE-BSD FRML MDRD: 72 ML/MIN/1.73
GLUCOSE SERPL-MCNC: 95 MG/DL (ref 65–99)
HBA1C MFR BLD: 5.4 % (ref 4.8–5.6)
HCT VFR BLD AUTO: 40.7 % (ref 34–46.6)
HGB BLD-MCNC: 13.3 G/DL (ref 12–15.9)
MCH RBC QN AUTO: 31.1 PG (ref 26.6–33)
MCHC RBC AUTO-ENTMCNC: 32.7 G/DL (ref 31.5–35.7)
MCV RBC AUTO: 95.3 FL (ref 79–97)
PLATELET # BLD AUTO: 266 10*3/MM3 (ref 140–450)
PMV BLD AUTO: 9.2 FL (ref 6–12)
POTASSIUM SERPL-SCNC: 4.3 MMOL/L (ref 3.5–5.2)
QT INTERVAL: 362 MS
QTC INTERVAL: 409 MS
RBC # BLD AUTO: 4.27 10*6/MM3 (ref 3.77–5.28)
SODIUM SERPL-SCNC: 141 MMOL/L (ref 136–145)
WBC NRBC COR # BLD: 5.32 10*3/MM3 (ref 3.4–10.8)

## 2022-02-22 PROCEDURE — 80048 BASIC METABOLIC PNL TOTAL CA: CPT

## 2022-02-22 PROCEDURE — 93005 ELECTROCARDIOGRAM TRACING: CPT

## 2022-02-22 PROCEDURE — 36415 COLL VENOUS BLD VENIPUNCTURE: CPT

## 2022-02-22 PROCEDURE — 85027 COMPLETE CBC AUTOMATED: CPT

## 2022-02-22 PROCEDURE — 93010 ELECTROCARDIOGRAM REPORT: CPT | Performed by: INTERNAL MEDICINE

## 2022-02-22 PROCEDURE — 83036 HEMOGLOBIN GLYCOSYLATED A1C: CPT

## 2022-02-22 NOTE — PAT
Patient viewed general PAT education video as instructed in their preoperative information received from their surgeon.  Patient stated the general PAT education video was viewed in its entirety and survey completed.  Copies of [facility] general education handouts (Incentive Spirometry, Meds to Beds Program, Patient Belongings, Pre-op skin preparation instructions, Blood Glucose testing, Visitor policy, Surgery FAQ, Code H) distributed to patient if not printed. Education related to the PAT pass and skin preparation for surgery (if applicable) completed in PAT as a reinforcement to PAT education video. Patient instructed to return PAT pass provided today as well as completed skin preparation sheet (if applicable) on the day of procedure.     Additionally if patient had not viewed video yet but intended to view it at home or in our waiting area, then referred them to the handout with QR code/link provided during PAT visit.  Instructed patient to complete survey after viewing the video in its entirety.  Encouraged patient/family to read [facility] general education handouts thoroughly and notify PAT staff with any questions or concerns. Patient verbalized understanding of all information and priority content.    Patient to apply Chlorhexadine wipes  to surgical area (as instructed) the night before procedure and the AM of procedure. Wipes provided.    Patient instructed to drink 20 ounces (or until full) of Gatorade and it needs to be completed 1 hour (for Main OR patients) or 2 hours (scheduled  section patients) before given arrival time for procedure (NO RED Gatorade)    Patient verbalized understanding.    Per Anesthesia Request, patient instructed not to take their ACE/ARB medications on the AM of surgery.    Pt tested positive for Covid within 90 days.    Detected  PCR.  Copy of result faxed to HIM and on chart.   No repeat test needed.

## 2022-02-24 ENCOUNTER — HOSPITAL ENCOUNTER (OUTPATIENT)
Facility: HOSPITAL | Age: 62
Setting detail: SURGERY ADMIT
Discharge: HOME | End: 2022-02-24
Attending: PLASTIC SURGERY | Admitting: PLASTIC SURGERY

## 2022-02-24 ENCOUNTER — ANESTHESIA (OUTPATIENT)
Dept: PERIOP | Facility: HOSPITAL | Age: 62
End: 2022-02-24

## 2022-02-24 ENCOUNTER — ANESTHESIA EVENT (OUTPATIENT)
Dept: PERIOP | Facility: HOSPITAL | Age: 62
End: 2022-02-24

## 2022-02-24 VITALS
SYSTOLIC BLOOD PRESSURE: 130 MMHG | HEIGHT: 64 IN | OXYGEN SATURATION: 96 % | TEMPERATURE: 97.5 F | RESPIRATION RATE: 16 BRPM | DIASTOLIC BLOOD PRESSURE: 65 MMHG | HEART RATE: 59 BPM | BODY MASS INDEX: 35.83 KG/M2 | WEIGHT: 209.88 LBS

## 2022-02-24 PROCEDURE — 0 CEFAZOLIN IN DEXTROSE 2-4 GM/100ML-% SOLUTION: Performed by: PLASTIC SURGERY

## 2022-02-24 PROCEDURE — 25010000002 ONDANSETRON PER 1 MG: Performed by: NURSE ANESTHETIST, CERTIFIED REGISTERED

## 2022-02-24 PROCEDURE — 25010000002 NEOSTIGMINE 10 MG/10ML SOLUTION: Performed by: NURSE ANESTHETIST, CERTIFIED REGISTERED

## 2022-02-24 PROCEDURE — 25010000002 FENTANYL CITRATE (PF) 50 MCG/ML SOLUTION: Performed by: NURSE ANESTHETIST, CERTIFIED REGISTERED

## 2022-02-24 PROCEDURE — 25010000002 PROPOFOL 10 MG/ML EMULSION: Performed by: NURSE ANESTHETIST, CERTIFIED REGISTERED

## 2022-02-24 PROCEDURE — C1889 IMPLANT/INSERT DEVICE, NOC: HCPCS | Performed by: PLASTIC SURGERY

## 2022-02-24 PROCEDURE — 25010000002 GENTAMICIN PER 80 MG: Performed by: PLASTIC SURGERY

## 2022-02-24 PROCEDURE — 25010000002 DEXAMETHASONE PER 1 MG: Performed by: NURSE ANESTHETIST, CERTIFIED REGISTERED

## 2022-02-24 PROCEDURE — 0 CEFAZOLIN PER 500 MG: Performed by: PLASTIC SURGERY

## 2022-02-24 PROCEDURE — C1789 PROSTHESIS, BREAST, IMP: HCPCS | Performed by: PLASTIC SURGERY

## 2022-02-24 DEVICE — DEV CONTRL TISS STRATAFIX SPIRAL MNCRYL UD 3/0 PLS 30CM: Type: IMPLANTABLE DEVICE | Site: BREAST | Status: FUNCTIONAL

## 2022-02-24 DEVICE — BRST SIL NATRELLE INSPIRA SMOTH XF/P 580CC: Type: IMPLANTABLE DEVICE | Site: BREAST | Status: FUNCTIONAL

## 2022-02-24 RX ORDER — HYDROMORPHONE HYDROCHLORIDE 1 MG/ML
0.5 INJECTION, SOLUTION INTRAMUSCULAR; INTRAVENOUS; SUBCUTANEOUS
Status: DISCONTINUED | OUTPATIENT
Start: 2022-02-24 | End: 2022-02-24 | Stop reason: HOSPADM

## 2022-02-24 RX ORDER — LIDOCAINE HYDROCHLORIDE AND EPINEPHRINE 10; 10 MG/ML; UG/ML
INJECTION, SOLUTION INFILTRATION; PERINEURAL AS NEEDED
Status: DISCONTINUED | OUTPATIENT
Start: 2022-02-24 | End: 2022-02-24 | Stop reason: HOSPADM

## 2022-02-24 RX ORDER — DROPERIDOL 2.5 MG/ML
0.62 INJECTION, SOLUTION INTRAMUSCULAR; INTRAVENOUS AS NEEDED
Status: DISCONTINUED | OUTPATIENT
Start: 2022-02-24 | End: 2022-02-24 | Stop reason: HOSPADM

## 2022-02-24 RX ORDER — SODIUM CHLORIDE 0.9 % (FLUSH) 0.9 %
10 SYRINGE (ML) INJECTION EVERY 12 HOURS SCHEDULED
Status: DISCONTINUED | OUTPATIENT
Start: 2022-02-24 | End: 2022-02-24 | Stop reason: HOSPADM

## 2022-02-24 RX ORDER — SODIUM CHLORIDE, SODIUM LACTATE, POTASSIUM CHLORIDE, CALCIUM CHLORIDE 600; 310; 30; 20 MG/100ML; MG/100ML; MG/100ML; MG/100ML
9 INJECTION, SOLUTION INTRAVENOUS CONTINUOUS
Status: DISCONTINUED | OUTPATIENT
Start: 2022-02-24 | End: 2022-02-24 | Stop reason: HOSPADM

## 2022-02-24 RX ORDER — LABETALOL HYDROCHLORIDE 5 MG/ML
5 INJECTION, SOLUTION INTRAVENOUS
Status: DISCONTINUED | OUTPATIENT
Start: 2022-02-24 | End: 2022-02-24 | Stop reason: HOSPADM

## 2022-02-24 RX ORDER — PROMETHAZINE HYDROCHLORIDE 25 MG/1
25 TABLET ORAL ONCE AS NEEDED
Status: DISCONTINUED | OUTPATIENT
Start: 2022-02-24 | End: 2022-02-24 | Stop reason: HOSPADM

## 2022-02-24 RX ORDER — HYDRALAZINE HYDROCHLORIDE 20 MG/ML
5 INJECTION INTRAMUSCULAR; INTRAVENOUS
Status: DISCONTINUED | OUTPATIENT
Start: 2022-02-24 | End: 2022-02-24 | Stop reason: HOSPADM

## 2022-02-24 RX ORDER — SODIUM CHLORIDE 0.9 % (FLUSH) 0.9 %
3-10 SYRINGE (ML) INJECTION AS NEEDED
Status: DISCONTINUED | OUTPATIENT
Start: 2022-02-24 | End: 2022-02-24 | Stop reason: HOSPADM

## 2022-02-24 RX ORDER — CEFAZOLIN SODIUM 2 G/100ML
2 INJECTION, SOLUTION INTRAVENOUS ONCE
Status: COMPLETED | OUTPATIENT
Start: 2022-02-24 | End: 2022-02-24

## 2022-02-24 RX ORDER — ROCURONIUM BROMIDE 10 MG/ML
INJECTION, SOLUTION INTRAVENOUS AS NEEDED
Status: DISCONTINUED | OUTPATIENT
Start: 2022-02-24 | End: 2022-02-24 | Stop reason: SURG

## 2022-02-24 RX ORDER — FENTANYL CITRATE 50 UG/ML
50 INJECTION, SOLUTION INTRAMUSCULAR; INTRAVENOUS
Status: DISCONTINUED | OUTPATIENT
Start: 2022-02-24 | End: 2022-02-24 | Stop reason: HOSPADM

## 2022-02-24 RX ORDER — FAMOTIDINE 20 MG/1
20 TABLET, FILM COATED ORAL ONCE
Status: COMPLETED | OUTPATIENT
Start: 2022-02-24 | End: 2022-02-24

## 2022-02-24 RX ORDER — NEOSTIGMINE METHYLSULFATE 1 MG/ML
INJECTION, SOLUTION INTRAVENOUS AS NEEDED
Status: DISCONTINUED | OUTPATIENT
Start: 2022-02-24 | End: 2022-02-24 | Stop reason: SURG

## 2022-02-24 RX ORDER — NALOXONE HCL 0.4 MG/ML
0.4 VIAL (ML) INJECTION AS NEEDED
Status: DISCONTINUED | OUTPATIENT
Start: 2022-02-24 | End: 2022-02-24 | Stop reason: HOSPADM

## 2022-02-24 RX ORDER — SODIUM CHLORIDE 0.9 % (FLUSH) 0.9 %
10 SYRINGE (ML) INJECTION AS NEEDED
Status: DISCONTINUED | OUTPATIENT
Start: 2022-02-24 | End: 2022-02-24 | Stop reason: HOSPADM

## 2022-02-24 RX ORDER — HYDROCODONE BITARTRATE AND ACETAMINOPHEN 5; 325 MG/1; MG/1
1 TABLET ORAL ONCE AS NEEDED
Status: DISCONTINUED | OUTPATIENT
Start: 2022-02-24 | End: 2022-02-24 | Stop reason: HOSPADM

## 2022-02-24 RX ORDER — LIDOCAINE HYDROCHLORIDE 10 MG/ML
INJECTION, SOLUTION EPIDURAL; INFILTRATION; INTRACAUDAL; PERINEURAL AS NEEDED
Status: DISCONTINUED | OUTPATIENT
Start: 2022-02-24 | End: 2022-02-24 | Stop reason: SURG

## 2022-02-24 RX ORDER — GLYCOPYRROLATE 0.2 MG/ML
INJECTION INTRAMUSCULAR; INTRAVENOUS AS NEEDED
Status: DISCONTINUED | OUTPATIENT
Start: 2022-02-24 | End: 2022-02-24 | Stop reason: SURG

## 2022-02-24 RX ORDER — ONDANSETRON 2 MG/ML
4 INJECTION INTRAMUSCULAR; INTRAVENOUS ONCE AS NEEDED
Status: DISCONTINUED | OUTPATIENT
Start: 2022-02-24 | End: 2022-02-24 | Stop reason: HOSPADM

## 2022-02-24 RX ORDER — MAGNESIUM HYDROXIDE 1200 MG/15ML
LIQUID ORAL AS NEEDED
Status: DISCONTINUED | OUTPATIENT
Start: 2022-02-24 | End: 2022-02-24 | Stop reason: HOSPADM

## 2022-02-24 RX ORDER — MEPERIDINE HYDROCHLORIDE 25 MG/ML
12.5 INJECTION INTRAMUSCULAR; INTRAVENOUS; SUBCUTANEOUS
Status: DISCONTINUED | OUTPATIENT
Start: 2022-02-24 | End: 2022-02-24 | Stop reason: HOSPADM

## 2022-02-24 RX ORDER — ONDANSETRON 2 MG/ML
INJECTION INTRAMUSCULAR; INTRAVENOUS AS NEEDED
Status: DISCONTINUED | OUTPATIENT
Start: 2022-02-24 | End: 2022-02-24 | Stop reason: SURG

## 2022-02-24 RX ORDER — PROMETHAZINE HYDROCHLORIDE 25 MG/1
25 SUPPOSITORY RECTAL ONCE AS NEEDED
Status: DISCONTINUED | OUTPATIENT
Start: 2022-02-24 | End: 2022-02-24 | Stop reason: HOSPADM

## 2022-02-24 RX ORDER — MIDAZOLAM HYDROCHLORIDE 1 MG/ML
1 INJECTION INTRAMUSCULAR; INTRAVENOUS
Status: DISCONTINUED | OUTPATIENT
Start: 2022-02-24 | End: 2022-02-24 | Stop reason: HOSPADM

## 2022-02-24 RX ORDER — PROPOFOL 10 MG/ML
VIAL (ML) INTRAVENOUS AS NEEDED
Status: DISCONTINUED | OUTPATIENT
Start: 2022-02-24 | End: 2022-02-24 | Stop reason: SURG

## 2022-02-24 RX ORDER — IPRATROPIUM BROMIDE AND ALBUTEROL SULFATE 2.5; .5 MG/3ML; MG/3ML
3 SOLUTION RESPIRATORY (INHALATION) ONCE AS NEEDED
Status: DISCONTINUED | OUTPATIENT
Start: 2022-02-24 | End: 2022-02-24 | Stop reason: HOSPADM

## 2022-02-24 RX ORDER — EPHEDRINE SULFATE 50 MG/ML
INJECTION, SOLUTION INTRAVENOUS AS NEEDED
Status: DISCONTINUED | OUTPATIENT
Start: 2022-02-24 | End: 2022-02-24 | Stop reason: SURG

## 2022-02-24 RX ORDER — SCOLOPAMINE TRANSDERMAL SYSTEM 1 MG/1
1 PATCH, EXTENDED RELEASE TRANSDERMAL ONCE
Status: DISCONTINUED | OUTPATIENT
Start: 2022-02-24 | End: 2022-02-24 | Stop reason: HOSPADM

## 2022-02-24 RX ORDER — ATORVASTATIN CALCIUM 20 MG/1
20 TABLET, FILM COATED ORAL DAILY
Status: ON HOLD | COMMUNITY
End: 2022-11-03

## 2022-02-24 RX ORDER — LIDOCAINE HYDROCHLORIDE 10 MG/ML
0.5 INJECTION, SOLUTION EPIDURAL; INFILTRATION; INTRACAUDAL; PERINEURAL ONCE AS NEEDED
Status: COMPLETED | OUTPATIENT
Start: 2022-02-24 | End: 2022-02-24

## 2022-02-24 RX ORDER — FAMOTIDINE 10 MG/ML
20 INJECTION, SOLUTION INTRAVENOUS ONCE
Status: DISCONTINUED | OUTPATIENT
Start: 2022-02-24 | End: 2022-02-24 | Stop reason: HOSPADM

## 2022-02-24 RX ORDER — DEXAMETHASONE SODIUM PHOSPHATE 4 MG/ML
INJECTION, SOLUTION INTRA-ARTICULAR; INTRALESIONAL; INTRAMUSCULAR; INTRAVENOUS; SOFT TISSUE AS NEEDED
Status: DISCONTINUED | OUTPATIENT
Start: 2022-02-24 | End: 2022-02-24 | Stop reason: SURG

## 2022-02-24 RX ORDER — SODIUM CHLORIDE 0.9 % (FLUSH) 0.9 %
3 SYRINGE (ML) INJECTION EVERY 12 HOURS SCHEDULED
Status: DISCONTINUED | OUTPATIENT
Start: 2022-02-24 | End: 2022-02-24 | Stop reason: HOSPADM

## 2022-02-24 RX ORDER — DROPERIDOL 2.5 MG/ML
0.62 INJECTION, SOLUTION INTRAMUSCULAR; INTRAVENOUS ONCE AS NEEDED
Status: DISCONTINUED | OUTPATIENT
Start: 2022-02-24 | End: 2022-02-24 | Stop reason: HOSPADM

## 2022-02-24 RX ORDER — FENTANYL CITRATE 50 UG/ML
INJECTION, SOLUTION INTRAMUSCULAR; INTRAVENOUS AS NEEDED
Status: DISCONTINUED | OUTPATIENT
Start: 2022-02-24 | End: 2022-02-24 | Stop reason: SURG

## 2022-02-24 RX ADMIN — ROCURONIUM BROMIDE 10 MG: 10 INJECTION, SOLUTION INTRAVENOUS at 08:15

## 2022-02-24 RX ADMIN — LIDOCAINE HYDROCHLORIDE 0.5 ML: 10 INJECTION, SOLUTION EPIDURAL; INFILTRATION; INTRACAUDAL; PERINEURAL at 07:10

## 2022-02-24 RX ADMIN — ROCURONIUM BROMIDE 40 MG: 10 INJECTION, SOLUTION INTRAVENOUS at 07:42

## 2022-02-24 RX ADMIN — PROPOFOL 25 MCG/KG/MIN: 10 INJECTION, EMULSION INTRAVENOUS at 07:50

## 2022-02-24 RX ADMIN — SCOPALAMINE 1 PATCH: 1 PATCH, EXTENDED RELEASE TRANSDERMAL at 07:12

## 2022-02-24 RX ADMIN — FAMOTIDINE 20 MG: 20 TABLET ORAL at 07:10

## 2022-02-24 RX ADMIN — FENTANYL CITRATE 50 MCG: 50 INJECTION, SOLUTION INTRAMUSCULAR; INTRAVENOUS at 07:42

## 2022-02-24 RX ADMIN — NEOSTIGMINE METHYLSULFATE 4 MG: 0.5 INJECTION INTRAVENOUS at 08:57

## 2022-02-24 RX ADMIN — DEXAMETHASONE SODIUM PHOSPHATE 8 MG: 4 INJECTION, SOLUTION INTRA-ARTICULAR; INTRALESIONAL; INTRAMUSCULAR; INTRAVENOUS; SOFT TISSUE at 07:51

## 2022-02-24 RX ADMIN — EPHEDRINE SULFATE 5 MG: 50 INJECTION INTRAVENOUS at 08:24

## 2022-02-24 RX ADMIN — GLYCOPYRROLATE 0.4 MG: 0.2 INJECTION INTRAMUSCULAR; INTRAVENOUS at 08:57

## 2022-02-24 RX ADMIN — PROPOFOL 170 MG: 10 INJECTION, EMULSION INTRAVENOUS at 07:42

## 2022-02-24 RX ADMIN — LIDOCAINE HYDROCHLORIDE 100 MG: 10 INJECTION, SOLUTION EPIDURAL; INFILTRATION; INTRACAUDAL; PERINEURAL at 07:42

## 2022-02-24 RX ADMIN — ONDANSETRON 4 MG: 2 INJECTION INTRAMUSCULAR; INTRAVENOUS at 08:43

## 2022-02-24 RX ADMIN — FENTANYL CITRATE 50 MCG: 50 INJECTION, SOLUTION INTRAMUSCULAR; INTRAVENOUS at 08:15

## 2022-02-24 RX ADMIN — SODIUM CHLORIDE, POTASSIUM CHLORIDE, SODIUM LACTATE AND CALCIUM CHLORIDE 9 ML/HR: 600; 310; 30; 20 INJECTION, SOLUTION INTRAVENOUS at 07:10

## 2022-02-24 RX ADMIN — CEFAZOLIN SODIUM 2 G: 2 INJECTION, SOLUTION INTRAVENOUS at 07:46

## 2022-02-24 NOTE — ANESTHESIA PROCEDURE NOTES
Airway  Urgency: elective    Date/Time: 2/24/2022 7:44 AM  Airway not difficult    General Information and Staff    Patient location during procedure: OR    Indications and Patient Condition  Indications for airway management: airway protection    Preoxygenated: yes  MILS not maintained throughout  Mask difficulty assessment: 1 - vent by mask    Final Airway Details  Final airway type: endotracheal airway      Successful airway: ETT  Cuffed: yes   Successful intubation technique: direct laryngoscopy  Endotracheal tube insertion site: oral  Blade: Krishna  Blade size: 2  ETT size (mm): 7.0  Cormack-Lehane Classification: grade I - full view of glottis  Placement verified by: capnometry   Measured from: lips  ETT/EBT  to lips (cm): 20  Number of attempts at approach: 1  Assessment: lips, teeth, and gum same as pre-op and atraumatic intubation    Additional Comments  Negative epigastric sounds, with symmetric chest rise and fall

## 2022-02-24 NOTE — ANESTHESIA PREPROCEDURE EVALUATION
Anesthesia Evaluation     Patient summary reviewed and Nursing notes reviewed   no history of anesthetic complications:  NPO Solid Status: > 8 hours  NPO Liquid Status: > 8 hours           Airway   Mallampati: II  TM distance: >3 FB  Neck ROM: full  No difficulty expected  Dental      Pulmonary - negative pulmonary ROS and normal exam   Cardiovascular - normal exam    (+) hypertension,       Neuro/Psych  (+) psychiatric history,    GI/Hepatic/Renal/Endo      Musculoskeletal     Abdominal    Substance History      OB/GYN          Other   arthritis,    history of cancer                    Anesthesia Plan    ASA 2     general     intravenous induction     Anesthetic plan, all risks, benefits, and alternatives have been provided, discussed and informed consent has been obtained with: patient.    Plan discussed with CRNA.        CODE STATUS:

## 2022-02-24 NOTE — ANESTHESIA POSTPROCEDURE EVALUATION
Patient: Sina Greenfield    Procedure Summary     Date: 02/24/22 Room / Location:  [facility]    Anesthesia Start: 0736 Anesthesia Stop:     Procedure: BREAST RECONSTRUCTION, BREAST TISSUE EXPANDER EXCHANGE TO PERMANENT IMPLANTS (Bilateral Breast) Diagnosis:       Acquired absence of breast and absent nipple, bilateral      Personal history of breast cancer      Breast asymmetry    Surgeons: Mic Hannah MD Provider: Jose Mo MD    Anesthesia Type: general ASA Status: 2          Anesthesia Type: general    Vitals  Vitals Value Taken Time   BP     Temp     Pulse     Resp     SpO2 99 % 02/24/22 0917   Vitals shown include unvalidated device data.        Post Anesthesia Care and Evaluation    Patient location during evaluation: PACU  Patient participation: complete - patient participated  Level of consciousness: sleepy but conscious  Pain score: 0  Pain management: adequate  Airway patency: patent  Anesthetic complications: No anesthetic complications  PONV Status: none  Cardiovascular status: hemodynamically stable and acceptable  Respiratory status: nonlabored ventilation, acceptable and nasal cannula  Hydration status: acceptable

## 2022-04-20 ENCOUNTER — HOSPITAL ENCOUNTER (OUTPATIENT)
Dept: CT IMAGING | Facility: HOSPITAL | Age: 62
Discharge: HOME | End: 2022-04-20
Admitting: INTERNAL MEDICINE

## 2022-04-20 DIAGNOSIS — R91.1 LUNG NODULE: ICD-10-CM

## 2022-04-20 PROCEDURE — 71250 CT THORAX DX C-: CPT

## 2022-04-22 ENCOUNTER — OFFICE VISIT (OUTPATIENT)
Dept: PULMONOLOGY | Facility: CLINIC | Age: 62
End: 2022-04-22

## 2022-04-22 VITALS
HEIGHT: 64 IN | TEMPERATURE: 97.4 F | OXYGEN SATURATION: 98 % | DIASTOLIC BLOOD PRESSURE: 84 MMHG | WEIGHT: 204 LBS | HEART RATE: 84 BPM | SYSTOLIC BLOOD PRESSURE: 168 MMHG | BODY MASS INDEX: 34.83 KG/M2

## 2022-04-22 DIAGNOSIS — D86.1 SARCOIDOSIS OF LYMPH NODES: ICD-10-CM

## 2022-04-22 DIAGNOSIS — C50.412 MALIGNANT NEOPLASM OF UPPER-OUTER QUADRANT OF LEFT BREAST IN FEMALE, ESTROGEN RECEPTOR POSITIVE: ICD-10-CM

## 2022-04-22 DIAGNOSIS — R91.1 LUNG NODULE: Primary | ICD-10-CM

## 2022-04-22 DIAGNOSIS — Z17.0 MALIGNANT NEOPLASM OF UPPER-OUTER QUADRANT OF LEFT BREAST IN FEMALE, ESTROGEN RECEPTOR POSITIVE: ICD-10-CM

## 2022-04-22 PROCEDURE — 99214 OFFICE O/P EST MOD 30 MIN: CPT | Performed by: INTERNAL MEDICINE

## 2022-04-22 NOTE — PROGRESS NOTES
Follow Up Office Note       Patient Name: Sina Greenfield    Referring Physician: No ref. provider found    Chief Complaint:    Chief Complaint   Patient presents with   • Lung Nodule       History of Present Illness: Sina Greenfield is a 61 y.o. female who is here today to follow-up care with Pulmonary.  The patient has a past medical history significant for breast cancer with a T1 N0 M0, that underwent a bilateral mastectomy in October 2021, she also has a history of sarcoidosis and followed at [facility] for roughly 10 years.  She also has a history of sarcoidosis and anxiety.  Who underwent a CT scan of the chest as part of her cancer imaging that showed a 6 mm pleural-based nodule in the right lower lobe and also patient was found to have mediastinal and hilar lymphadenopathy.  At the last visit we evaluated her and ultimately decided that she should do a follow-up CT scan in 6 months due to the fact that it was unclear if the 6 mm noncalcified right lower lobe nodule was malignant.  But in addition to that she has a history of malignant neoplasm of the left breast.  It was felt that lymph node enlargement was likely due to sarcoidosis.  She currently denies any chest pain, nausea, fever, or chills.  She has no other acute complaints.    Review of Systems:   Review of Systems   Constitutional: Negative for chills, fatigue and fever.   HENT: Negative for congestion and voice change.    Eyes: Negative for blurred vision.   Respiratory: Negative for cough, shortness of breath and wheezing.    Cardiovascular: Negative for chest pain.   Skin: Negative for dry skin.   Hematological: Negative for adenopathy.   Psychiatric/Behavioral: Negative for agitation and depressed mood.       The following portions of the patient's history were reviewed and updated as appropriate: allergies, current medications, past family history, past medical history, past social history, past surgical history and problem  "list.    Physical Exam:  Vital Signs:   Vitals:    04/22/22 1117   BP: 168/84   BP Location: Right arm   Patient Position: Sitting   Pulse: 84   Temp: 97.4 °F (36.3 °C)   SpO2: 98%  Comment: room air  at rest   Weight: 92.5 kg (204 lb)   Height: 162.6 cm (64\")       Physical Exam  Vitals and nursing note reviewed.   Constitutional:       General: She is not in acute distress.     Appearance: She is well-developed and normal weight. She is not ill-appearing or toxic-appearing.   HENT:      Head: Normocephalic and atraumatic.   Cardiovascular:      Rate and Rhythm: Normal rate and regular rhythm.      Pulses: Normal pulses.      Heart sounds: Normal heart sounds. No murmur heard.    No friction rub. No gallop.   Pulmonary:      Effort: Pulmonary effort is normal. No respiratory distress.      Breath sounds: Normal breath sounds. No wheezing, rhonchi or rales.   Musculoskeletal:      Right lower leg: No edema.      Left lower leg: No edema.   Skin:     General: Skin is warm and dry.   Neurological:      Mental Status: She is alert and oriented to person, place, and time.         Immunization History   Administered Date(s) Administered   • COVID-19 (PFIZER) PURPLE CAP 03/17/2021, 04/07/2021, 11/10/2021   • Flu Vaccine Split Quad 10/04/2021   • [place] Quad =>18yrs 10/06/2020       Results Review:   - I personally reviewed the pts imaging from  CT scan of the chest from 4/2022 showed that the 6 mm nodule in the right lower lobe is now calcified, the mediastinal adenopathy is all stable, and all of her other nodules have been present now since 2008, and are not concerning for malignancy.  This is all likely secondary to old granulomatous disease.   - CT of the chest from October 1, 2021 showed a 6 mm nodule in the right lower lobe that was noncalcified with some bilateral mediastinal and hilar lymphadenopathy.  - I personally reviewed the pts  chart with regards to her recent breast reconstruction status post breast " cancer treatment.    Assessment / Plan:   1. 6 mm noncalcified right lower lobe nodule (Primary)  2. Malignant neoplasm of upper-outer quadrant of left breast in female, estrogen receptor positive (HCC)  3. Sarcoidosis of lymph nodes  -The right lower lobe nodule is now calcified.  This is indicative of benign granuloma.  There are no other suspicious nodules on the CT scan.  We did review the imaging together today.  I also reviewed the imaging from 2008.  Ultimately she has lymphadenopathy related to her sarcoidosis, but she was followed for many years and is currently asymptomatic.  I informed her that she continues to need at least see a eye doctor on a roughly yearly basis.  We continue to follow her for sarcoidosis.  But there is no further imaging required for the nodule.  Any further imaging necessary would be based upon her cancer screening.  I will see her back in roughly a year, or sooner if she is having symptoms.  -PFTs with yearly follow-up    Follow Up:   Return in about 1 year (around 4/22/2023) for PFTs.       CHRISTINE Davila, DO  Pulmonary and Critical Care Medicine  Note Electronically Signed    Part of this note may be an electronic transcription/translation of spoken language to printed text using the Dragon Dictation System.

## 2022-07-26 ENCOUNTER — OFFICE VISIT (OUTPATIENT)
Dept: PSYCHIATRY | Facility: CLINIC | Age: 62
End: 2022-07-26

## 2022-07-26 ENCOUNTER — OFFICE VISIT (OUTPATIENT)
Dept: ONCOLOGY | Facility: CLINIC | Age: 62
End: 2022-07-26

## 2022-07-26 VITALS
HEIGHT: 64 IN | HEART RATE: 75 BPM | DIASTOLIC BLOOD PRESSURE: 65 MMHG | BODY MASS INDEX: 36.54 KG/M2 | RESPIRATION RATE: 18 BRPM | TEMPERATURE: 98 F | SYSTOLIC BLOOD PRESSURE: 156 MMHG | OXYGEN SATURATION: 98 % | WEIGHT: 214 LBS

## 2022-07-26 DIAGNOSIS — C50.412 MALIGNANT NEOPLASM OF UPPER-OUTER QUADRANT OF LEFT BREAST IN FEMALE, ESTROGEN RECEPTOR POSITIVE: Primary | ICD-10-CM

## 2022-07-26 DIAGNOSIS — Z17.0 MALIGNANT NEOPLASM OF UPPER-OUTER QUADRANT OF LEFT BREAST IN FEMALE, ESTROGEN RECEPTOR POSITIVE: Primary | ICD-10-CM

## 2022-07-26 DIAGNOSIS — G47.9 SLEEP DISTURBANCE: ICD-10-CM

## 2022-07-26 DIAGNOSIS — F41.1 GAD (GENERALIZED ANXIETY DISORDER): Primary | ICD-10-CM

## 2022-07-26 DIAGNOSIS — Z78.0 POSTMENOPAUSAL: ICD-10-CM

## 2022-07-26 DIAGNOSIS — Z13.820 OSTEOPOROSIS SCREENING: ICD-10-CM

## 2022-07-26 PROCEDURE — 90837 PSYTX W PT 60 MINUTES: CPT | Performed by: NURSE PRACTITIONER

## 2022-07-26 PROCEDURE — 99214 OFFICE O/P EST MOD 30 MIN: CPT | Performed by: NURSE PRACTITIONER

## 2022-07-26 RX ORDER — ZOLPIDEM TARTRATE 5 MG/1
5 TABLET ORAL DAILY
Status: ON HOLD | COMMUNITY
Start: 2022-06-06 | End: 2022-11-03

## 2022-07-26 RX ORDER — TRAZODONE HYDROCHLORIDE 50 MG/1
50 TABLET ORAL
Status: ON HOLD | COMMUNITY
Start: 2022-06-06 | End: 2022-11-03

## 2022-07-26 RX ORDER — ESCITALOPRAM OXALATE 5 MG/1
5 TABLET ORAL DAILY
COMMUNITY
Start: 2022-07-16

## 2022-07-26 RX ORDER — ATORVASTATIN CALCIUM 10 MG/1
10 TABLET, FILM COATED ORAL DAILY
COMMUNITY
Start: 2022-05-02

## 2022-07-26 NOTE — PROGRESS NOTES
Subjective   Sina Greenfield is a 61 y.o. female who is here today for therapy in person face to face utilizing Covid precautions including distance and masks.     TIME IN:1104a  TIME OUT: 1200    Therapy:  Start Time: 1104   Stop Time: 1200    (55 ) minutes was spent for psychotherapy. Assisted patient in processing patient's ALEXIS. Acknowledged and normalized patient's thoughts, feelings, and concerns. Utilized cognitive behavioral therapy to assist the patient in recognizing more appropriate coping mechanisms when she becomes agitated/anxious which are proven effective in reducing the severity of frequency of symptoms.     CLINICAL MANUEVERING/INTERVENTION:   Patient talked about current stressors, primarily having a difficult time dealing with sibling relationships and fear of cancer recurrence. Venting of frustrations was conducted. Feelings were processed and validated, both negative and positive. Flushing out worries and concerns was conducted in order to diminish emotional tension. Processing treatment cancer treatment with Arimidex and ongoing breast reconstruction was conducted. Ways in which patient may take stress off herself in regards to family relationships in a purposeful manner was discussed. Patient was assisted in 'talking out' what she may do to promote her own health intentionally.  Utilized motivational interviewing techniques including complex reflections to attempt to assist the patient and focusing on the positive and to maintain and encourage calm outlook. The patient expressed gratitude for today's session and said that counseling helps her feel better.      The following portions of the patient's history were reviewed and updated as appropriate: allergies, current medications, past family history, past medical history, past social history, past surgical history and problem list.    Review of Systems  A 14 point review of systems was performed and is negative except as noted  above.    Objective   Physical Exam  not currently breastfeeding.    No Known Allergies    Current Medications:   Current Outpatient Medications   Medication Sig Dispense Refill   • ALPRAZolam (XANAX) 0.5 MG tablet Take 0.25 mg by mouth Every 8 (Eight) Hours As Needed for Anxiety.     • anastrozole (ARIMIDEX) 1 MG tablet Take 1 tablet by mouth Daily. 90 tablet 3   • atorvastatin (LIPITOR) 10 MG tablet Take 10 mg by mouth Daily.     • atorvastatin (LIPITOR) 20 MG tablet Take 20 mg by mouth Daily.     • escitalopram (LEXAPRO) 5 MG tablet Take 5 mg by mouth Daily.     • losartan (COZAAR) 50 MG tablet Take 50 mg by mouth Daily.     • multivitamin with minerals tablet tablet Take 1 tablet by mouth Daily.     • traZODone (DESYREL) 50 MG tablet Take 50 mg by mouth every night at bedtime.     • vitamin D3 125 MCG (5000 UT) capsule capsule Take 5,000 Units by mouth Daily.     • zolpidem (AMBIEN) 5 MG tablet Take 5 mg by mouth Daily.       No current facility-administered medications for this visit.       Lab Results:  Pre-Admission Testing on 02/22/2022   Component Date Value Ref Range Status   • WBC 02/22/2022 5.32  3.40 - 10.80 10*3/mm3 Final   • RBC 02/22/2022 4.27  3.77 - 5.28 10*6/mm3 Final   • Hemoglobin 02/22/2022 13.3  12.0 - 15.9 g/dL Final   • Hematocrit 02/22/2022 40.7  34.0 - 46.6 % Final   • MCV 02/22/2022 95.3  79.0 - 97.0 fL Final   • MCH 02/22/2022 31.1  26.6 - 33.0 pg Final   • MCHC 02/22/2022 32.7  31.5 - 35.7 g/dL Final   • RDW 02/22/2022 12.5  12.3 - 15.4 % Final   • RDW-SD 02/22/2022 43.5  37.0 - 54.0 fl Final   • MPV 02/22/2022 9.2  6.0 - 12.0 fL Final   • Platelets 02/22/2022 266  140 - 450 10*3/mm3 Final   • Glucose 02/22/2022 95  65 - 99 mg/dL Final   • BUN 02/22/2022 20  6 - 20 mg/dL Final   • Creatinine 02/22/2022 0.81  0.57 - 1.00 mg/dL Final   • Sodium 02/22/2022 141  136 - 145 mmol/L Final   • Potassium 02/22/2022 4.3  3.5 - 5.2 mmol/L Final    Slight hemolysis detected by analyzer. Results may  be affected.   • Chloride 02/22/2022 101  98 - 107 mmol/L Final   • CO2 02/22/2022 29.0  22.0 - 29.0 mmol/L Final   • Calcium 02/22/2022 10.1  8.6 - 10.5 mg/dL Final   • eGFR Non  Amer 02/22/2022 72  >60 mL/min/1.73 Final   • BUN/Creatinine Ratio 02/22/2022 24.7  7.0 - 25.0 Final   • Anion Gap 02/22/2022 11.0  5.0 - 15.0 mmol/L Final   • Hemoglobin A1C 02/22/2022 5.40  4.80 - 5.60 % Final   • QT Interval 02/22/2022 362  ms Final   • QTC Interval 02/22/2022 409  ms Final       Appearance: WNL, over weight   Hygiene:   good  Cooperation:  Cooperative  Eye Contact:  direct  Psychomotor Behavior:  denies psychomotor agitation/retardation, No EPS, No motor tics  Mood:  Anxious about family relations  Affect:  Appropriate   Hopelessness: Denies  Speech:  Normal  Thought Process:  Linear  Thought Content:  Normal  Concentration: Normal   Suicidal: denies  Homicidal:  None  Hallucinations:  None  Delusion:  None  Memory:  Intact  Orientation:  Person, Place, Time and Situation  Reliability:  good  Insight:  Fair  Judgement: good  Impulse Control: good  Estimated Intelligence: average range    AUBREY REVIEWED NO RED FLAGS    Assessment & Plan   Diagnoses and all orders for this visit:    1. ALEXIS (generalized anxiety disorder) (Primary)    2. Sleep disturbance      IMPRESSION: sleeping well, situational anxiety     PLAN: cont working with her therapist   Will see as needed, she would like to have session after her next oncology appointment         Provide Cognitive Behavioral Therapy and Solution Focused Therapy to improve functioning, maintain stability, and avoid decompensation and the need for higher level of care.    Counseled patient regarding multimodal approach with encouragement of healthy nutrition, healthy sleep, regular physical mobility, social involvement, counseling, and medication compliance.     Assisted patient in identifying risk factors which would indicate the need for higher level of care including  thoughts to harm self or others and/or self-harming behavior and encouraged patient to contact this office, call 911, or present to the nearest emergency room should any of these events occur. Discussed crisis intervention services and means to access.  Patient adamantly and convincingly denies current suicidal or homicidal ideation or perceptual disturbance.    Treatment Plan: stabilize mood, patient will stay out of psychiatric hospital and be at optimal level of functioning with therapy and take all medication as prescribed. Patient verbalized  understanding and agreement to plan.    Instructed to call for questions or concerns and return early if necessary.     Greater than 50% time was spent in coordination of care, and counseling the patient regarding current assessment, symptoms, plan of care going forward, supportive therapy.  Answered any questions patient had regarding medications and plan of care.    Return in about 6 months (around 1/25/2023).

## 2022-07-26 NOTE — PROGRESS NOTES
CHIEF COMPLAINT: Breast cancer    Problem List:  Oncology/Hematology History Overview Note   1. [id number] ER+ TX+ Her2 negative invasive ductal carcinoma of the left breast with low risk Oncotype score 16.  A) bilateral mastectomy on 10/14/21.  Pathology showed a 1.5 cm high grade IDC.  0/2 SLN involved.  2. Hypertension  3. Sarcoidosis diagnosed on mediastinoscopy never required treatment.  4. Anxiety  5. History of atypical cyst found in ovarian cyst shortly after birth of her daughter 21 years ago followed with serial exams.      Oncology history timeline:  -10/1/2021 CT chestShow 6 mm pleural-based nodule right lung base for which continued follow-up in 6 months is recommended.  Dr. Schmid states that the enlargement of the lymph nodes within the hilar regions and mediastinum can easily be related to in part of her known sarcoidosis.  PET canceled  -10/14/2021  Right breast prophylactic skin sparing mastectomy benign.  Left breast skin sparing nipple sparing mastectomy invasive ductal 1.5 cm carcinoma Epi grade 3 wide margins.  0 out of 2 left sentinel nodes involved.  Louis Lewis 8 out of 9.  No lymph vascular invasion.  ER 95% 3+  TX 1-5% 3+  HER-2/will 5% 1+  Oncotype score 16.  4% distant recurrence risk at 9 years on hormone blockade alone.  Less than 1% adjuvant chemotherapeutic benefit.    -10/27/2021 consultation Dr. Krysten Anguiano: She discussedthe use of the Oncotype recurrence score.  This test provides information about the biology and risk of recurrence for ER positive Her2-negative breast cancers.  It is clear from previous studies that patients who have a low risk Oncotype do not benefit from adjuvant chemotherapy.  Conversely, patients with a high risk Oncotype can have a significant benefit from adjuvant chemotherapy.  Scores in the intermediate risk group may have a small benefit based on the patient's age.  We discussed that the Oncotype test is most useful if chemotherapy is an option  that the patient is considering.      Regardless of the decision about chemotherapy, she is a candidate for adjuvant endocrine therapy with an aromatase inhibitor.  We reviewed the potential side effects of anastrozole including hot flashes, vaginal dryness, joint pain, decrease in bone density, and mood or sleep disturbance.     She mentions that she has a trip to [place] planned in early February.  We discussed that if she does receive chemotherapy, this potentially could be ending shortly before her departure.     F/u 3 weeks to review oncotype result.    -10/28/2021 left nipple excision without neoplasm    -11/18/2021 [facility] medical oncology follow-up visit: Has her tissue expanders and beyond the discomfort there is doing well.  Alkaline phosphatase slightly elevated on baseline testing.  Talked about doing bone scanning but for now we will simply repeat the CMP prior to return to my nurse practitioner in 2 months for survivorship visit.  In the meantime we reviewed her Oncotype score which has a low risk Oncotype and no chemotherapy is recommended.  I would give her at least 5 years and preferably 7 of Arimidex.  Because she has had bilateral mastectomies, she needs no radiation.  For PTSD symptoms, we will refer her to [facility] Cardona and she will need OB/GYN evaluation which she is going to arrange given her history of ovarian abnormalities as outlined above.  She is going to see pulmonary medicine because of her sarcoidosis.  I think the abnormality of the pleura seen on CT is likely related to her sarcoid and not likely breast metastasis but will get pulmonary opinion.     Malignant neoplasm of upper-outer quadrant of left breast in female, estrogen receptor positive (HCC)   10/14/2021 Initial Diagnosis    Malignant neoplasm of upper-outer quadrant of left female breast (HCC)     10/27/2021 Cancer Staged    Staging form: Breast, AJCC 8th Edition  - Pathologic: Stage IA (pT1c, pN0, cM0, G3, ER+, MO+, HER2-) -  Signed by Krysten Anguiano MD on 10/27/2021     11/3/2021 -  Other Event    Oncotype score 16 with 4% distant recurrence risk at 9 years on hormone blockade alone with less than 1% adjuvant benefit from chemotherapy.     11/18/2021 -  Hormonal Therapy    Arimidex for a planned 5-7 years         HISTORY OF PRESENT ILLNESS:  The patient is a 61 y.o. female, here for follow up on management of ER positive left breast cancer currently on adjuvant therapy with Arimidex.  Since we saw Sina contreras she had her expanders removed and replaced with implants.  She states that she will be having further revision coming up in October.  Currently she feels well, has had a good summer being able to spend time with her daughter with an extended trip to [place].  She is working with a nutritionist now as she would like to lose weight.  No new or concerning findings on chest exam, no new or concerning bony aches or pains.  Has not had bone density testing yet.  States that she is tolerating Arimidex with no significant side effects.  Following with [facility] Card[facility] for counseling, feels that her anxiety is under good control.  Was started on escitalopram recently and is doing well with that, only takes Xanax rarely.      Past Medical History:   Diagnosis Date   • 6 mm noncalcified right lower lobe nodule 11/30/2021   • Anxiety    • Arthritis    • Cancer (HCC)     breast   • COVID-19 12/29/2021   • Hx of skin cancer, basal cell    • Hypertension    • Lung nodule    • Malignant neoplasm of upper-outer quadrant of left female breast (HCC) 10/14/2021   • Sarcoidosis     10 years ago   • Wears reading eyeglasses      Past Surgical History:   Procedure Laterality Date   • BREAST ABSCESS INCISION AND DRAINAGE Left 10/28/2021    Procedure: DEBRIDEMENT LEFT BREAST NIPPLE;  Surgeon: Mic Hannah MD;  Location: [facility];  Service: Plastics;  Laterality: Left;   • BREAST RECONSTRUCTION, BREAST TISSUE EXPANDER INSERTION Bilateral 10/14/2021     "Procedure: IMMEDIATE BILATERAL BREAST RECONSTRUCTION WITH TISSUE EXPANDERS AND ALLODERM;  Surgeon: Mic Hannah MD;  Location:  [facility];  Service: Plastics;  Laterality: Bilateral;   • BREAST RECONSTRUCTION, BREAST TISSUE EXPANDER REMOVAL, IMPLANT INSERTION Bilateral 2022    Procedure: BREAST RECONSTRUCTION, BREAST TISSUE EXPANDER EXCHANGE TO PERMANENT IMPLANTS BILATERAL;  Surgeon: Mic Hannah MD;  Location:  [facility];  Service: Plastics;  Laterality: Bilateral;   • BREAST SURGERY Left 10/28/2021    Procedure: COMPLEX CLOSURE LEFT;  Surgeon: Mic Hannah MD;  Location:  [facility];  Service: Plastics;  Laterality: Left;   • CARPAL TUNNEL RELEASE Bilateral    •  SECTION      with ovaraian cyst removed and found \"borderline cells\" in the cyst followed and released 6 years ago   • COLONOSCOPY     • FOOT SURGERY Left     hardware   • KNEE CARTILAGE SURGERY Left    • LYMPH NODE BIOPSY      2010   • MASTECTOMY W/ SENTINEL NODE BIOPSY Bilateral 10/14/2021    Procedure: SKIN SPARING MASTECTOMY, LEFT SENITNEL NODE BIOPSY, RIGHT PROPHYLACTIC SKIN SPARING MASTECTOMY;  Surgeon: Sylvester Mc MD;  Location:  [facility];  Service: General;  Laterality: Bilateral;   • TOTAL KNEE ARTHROPLASTY Left        No Known Allergies    Family History and Social History reviewed and changed as necessary    REVIEW OF SYSTEM:   Negative for new somatic concerns    PHYSICAL EXAM:  30    Vitals:    22 0946   BP: 156/65   Pulse: 75   Resp: 18   Temp: 98 °F (36.7 °C)   SpO2: 98%   Weight: 97.1 kg (214 lb)   Height: 162.6 cm (64\")     Vitals:    22 0946   PainSc: 0-No pain          ECOG score: 0           Vitals reviewed.    ECOG: (0) Fully Active - Able to Carry On All Pre-disease Performance Without Restriction    Lab Results   Component Value Date    HGB 13.3 2022    HCT 40.7 2022    MCV 95.3 2022     2022    WBC 5.32 2022       Lab Results   Component Value Date "    GLUCOSE 95 02/22/2022    BUN 20 02/22/2022    CREATININE 0.81 02/22/2022     02/22/2022    K 4.3 02/22/2022     02/22/2022    CO2 29.0 02/22/2022    CALCIUM 10.1 02/22/2022    PROTEINTOT 7.7 11/18/2021    ALBUMIN 4.60 11/18/2021    BILITOT 0.2 11/18/2021    ALKPHOS 126 (H) 11/18/2021    AST 24 11/18/2021    ALT 27 11/18/2021             ASSESSMENT & PLAN:    1. [id number] ER+ AL+ Her2 negative invasive ductal carcinoma of the left breast with low risk Oncotype score 16.  A) bilateral mastectomy on 10/14/21.  Pathology showed a 1.5 cm high grade IDC.  0/2 SLN involved.  2. Hypertension  3. Sarcoidosis diagnosed on mediastinoscopy never required treatment.  4. Anxiety  5. History of atypical cyst found in ovarian cyst shortly after birth of her daughter 21 years ago followed with serial exams.      Oncology history timeline:  -10/1/2021 CT chestShow 6 mm pleural-based nodule right lung base for which continued follow-up in 6 months is recommended.  Dr. Schmid states that the enlargement of the lymph nodes within the hilar regions and mediastinum can easily be related to in part of her known sarcoidosis.  PET canceled  -10/14/2021  Right breast prophylactic skin sparing mastectomy benign.  Left breast skin sparing nipple sparing mastectomy invasive ductal 1.5 cm carcinoma Maxwell grade 3 wide margins.  0 out of 2 left sentinel nodes involved.  [place] 8 out of 9.  No lymph vascular invasion.  ER 95% 3+  AL 1-5% 3+  HER-2/will 5% 1+  Oncotype score 16.  4% distant recurrence risk at 9 years on hormone blockade alone.  Less than 1% adjuvant chemotherapeutic benefit.    -10/27/2021 consultation Dr. Krysten Anguiano: She discussedthe use of the Oncotype recurrence score.  This test provides information about the biology and risk of recurrence for ER positive Her2-negative breast cancers.  It is clear from previous studies that patients who have a low risk Oncotype do not benefit from adjuvant  chemotherapy.  Conversely, patients with a high risk Oncotype can have a significant benefit from adjuvant chemotherapy.  Scores in the intermediate risk group may have a small benefit based on the patient's age.  We discussed that the Oncotype test is most useful if chemotherapy is an option that the patient is considering.      Regardless of the decision about chemotherapy, she is a candidate for adjuvant endocrine therapy with an aromatase inhibitor.  We reviewed the potential side effects of anastrozole including hot flashes, vaginal dryness, joint pain, decrease in bone density, and mood or sleep disturbance.     She mentions that she has a trip to [place] planned in early February.  We discussed that if she does receive chemotherapy, this potentially could be ending shortly before her departure.     F/u 3 weeks to review oncotype result.    -10/28/2021 left nipple excision without neoplasm    -11/18/2021 [facility] medical oncology follow-up visit: Has her tissue expanders and beyond the discomfort there is doing well.  Alkaline phosphatase slightly elevated on baseline testing.  Talked about doing bone scanning but for now we will simply repeat the CMP prior to return to my nurse practitioner in 2 months for survivorship visit.  In the meantime we reviewed her Oncotype score which has a low risk Oncotype and no chemotherapy is recommended.  I would give her at least 5 years and preferably 7 of Arimidex.  Because she has had bilateral mastectomies, she needs no radiation.  For PTSD symptoms, we will refer her to [facility] and she will need OB/GYN evaluation which she is going to arrange given her history of ovarian abnormalities as outlined above.  She is going to see pulmonary medicine because of her sarcoidosis.  I think the abnormality of the pleura seen on CT is likely related to her sarcoid and not likely breast metastasis but will get pulmonary opinion.    -1/11/2022 oncology survivorship visit  completed    -2022 [facility] Oncology clinic follow-up: Sina is doing well, she is tolerating Arimidex with no significant side effects.  We will plan on 5-7 years of adjuvant therapy, she began Arimidex 2021.  She will need periodic bone density testing and has not had that yet, I went ahead and ordered it today to get baseline, she states that they have been normal in the past.  She has had her expanders replaced with implants, she plans on further revision in October. She has no evidence of disease on clinical exam and no new worrisome symptoms.    She is up-to-date on routine gynecological exam.  She did ask today about having her ovaries removed since she has had breast cancer, she thought that her gynecologist mentioned that this is done often times when patients have had breast cancer.  I discussed with her that there is no recommendation or indication for routine oophorectomy in the absence of genetic predisposition, we do not just recommend removal of the ovaries when someone has had breast cancer.  She has no family or personal history that would warrant genetic testing.  She does have a history of atypical ovarian cyst that was found at the time of her daughter's birth which was a  22 years ago and was followed closely for quite some time but apparently everything returned negative and she resumed routine follow-up, I do not have those records but going by verbal report.  She saw her pulmonologist recently, Dr. Davila in regards to her history of sarcoidosis and right lower lobe nodule.  She was pleased to find out that the right lower lobe nodule is indicative of benign granuloma and there were no other worrisome findings on CT scan, she will follow with pulmonology annually, no plans for further scanning in the absence of new symptoms.  She continues to follow with ROBY Acevedo for counseling and her anxiety, it is under better control with medication changes, she is now on  escitalopram and only takes Xanax rarely.    Return to clinic in 6 months for follow-up.    I spent 30 minutes caring for Sina on this date of service. This time includes time spent by me in the following activities: preparing for the visit, reviewing tests, performing a medically appropriate examination and/or evaluation, counseling and educating the patient/family/caregiver, ordering medications, tests, or procedures and documenting information in the medical record.     Cherrie Claudio, APRN    07/26/2022

## 2022-07-28 ENCOUNTER — HOSPITAL ENCOUNTER (OUTPATIENT)
Dept: BONE DENSITY | Facility: HOSPITAL | Age: 62
Discharge: HOME | End: 2022-07-28
Admitting: NURSE PRACTITIONER

## 2022-07-28 DIAGNOSIS — Z78.0 POSTMENOPAUSAL: ICD-10-CM

## 2022-07-28 DIAGNOSIS — Z13.820 OSTEOPOROSIS SCREENING: ICD-10-CM

## 2022-07-28 PROCEDURE — 77080 DXA BONE DENSITY AXIAL: CPT

## 2022-08-01 ENCOUNTER — TELEPHONE (OUTPATIENT)
Dept: ONCOLOGY | Facility: CLINIC | Age: 62
End: 2022-08-01

## 2022-08-01 NOTE — TELEPHONE ENCOUNTER
----- Message from ROBY Goddard sent at 8/1/2022 12:43 PM EDT -----  Regarding: call pt  Please let Sina know her DEXA showed she was a little osteopenic, no osteoporosis.  Recommend to take calcium 1200 mg daily along with Vitamin D 800-1000 IU (if she is not already taking) and walking regularly for exercise.  Thank you.  Cherrie

## 2022-10-15 DIAGNOSIS — C50.412 MALIGNANT NEOPLASM OF UPPER-OUTER QUADRANT OF LEFT BREAST IN FEMALE, ESTROGEN RECEPTOR POSITIVE: ICD-10-CM

## 2022-10-15 DIAGNOSIS — Z17.0 MALIGNANT NEOPLASM OF UPPER-OUTER QUADRANT OF LEFT BREAST IN FEMALE, ESTROGEN RECEPTOR POSITIVE: ICD-10-CM

## 2022-10-17 RX ORDER — ANASTROZOLE 1 MG/1
TABLET ORAL
Qty: 90 TABLET | Refills: 3 | Status: SHIPPED | OUTPATIENT
Start: 2022-10-17

## 2022-10-27 ENCOUNTER — PRE-ADMISSION TESTING (OUTPATIENT)
Dept: PREADMISSION TESTING | Facility: HOSPITAL | Age: 62
End: 2022-10-27

## 2022-10-27 VITALS — WEIGHT: 214.29 LBS | HEIGHT: 65 IN | BODY MASS INDEX: 35.7 KG/M2

## 2022-10-27 LAB
ANION GAP SERPL CALCULATED.3IONS-SCNC: 10 MMOL/L (ref 5–15)
BUN SERPL-MCNC: 18 MG/DL (ref 8–23)
BUN/CREAT SERPL: 20.2 (ref 7–25)
CALCIUM SPEC-SCNC: 9.4 MG/DL (ref 8.6–10.5)
CHLORIDE SERPL-SCNC: 103 MMOL/L (ref 98–107)
CO2 SERPL-SCNC: 28 MMOL/L (ref 22–29)
CREAT SERPL-MCNC: 0.89 MG/DL (ref 0.57–1)
DEPRECATED RDW RBC AUTO: 44.2 FL (ref 37–54)
EGFRCR SERPLBLD CKD-EPI 2021: 74.3 ML/MIN/1.73
ERYTHROCYTE [DISTWIDTH] IN BLOOD BY AUTOMATED COUNT: 12.8 % (ref 12.3–15.4)
GLUCOSE SERPL-MCNC: 106 MG/DL (ref 65–99)
HCT VFR BLD AUTO: 39.5 % (ref 34–46.6)
HGB BLD-MCNC: 13 G/DL (ref 12–15.9)
MCH RBC QN AUTO: 31.2 PG (ref 26.6–33)
MCHC RBC AUTO-ENTMCNC: 32.9 G/DL (ref 31.5–35.7)
MCV RBC AUTO: 94.7 FL (ref 79–97)
PLATELET # BLD AUTO: 264 10*3/MM3 (ref 140–450)
PMV BLD AUTO: 9 FL (ref 6–12)
POTASSIUM SERPL-SCNC: 4.6 MMOL/L (ref 3.5–5.2)
QT INTERVAL: 354 MS
QTC INTERVAL: 403 MS
RBC # BLD AUTO: 4.17 10*6/MM3 (ref 3.77–5.28)
SODIUM SERPL-SCNC: 141 MMOL/L (ref 136–145)
WBC NRBC COR # BLD: 6.96 10*3/MM3 (ref 3.4–10.8)

## 2022-10-27 PROCEDURE — 36415 COLL VENOUS BLD VENIPUNCTURE: CPT

## 2022-10-27 PROCEDURE — 93005 ELECTROCARDIOGRAM TRACING: CPT

## 2022-10-27 PROCEDURE — 93010 ELECTROCARDIOGRAM REPORT: CPT | Performed by: INTERNAL MEDICINE

## 2022-10-27 PROCEDURE — 85027 COMPLETE CBC AUTOMATED: CPT

## 2022-10-27 PROCEDURE — 80048 BASIC METABOLIC PNL TOTAL CA: CPT

## 2022-10-27 RX ORDER — VALACYCLOVIR HYDROCHLORIDE 1 G/1
1000 TABLET, FILM COATED ORAL AS NEEDED
COMMUNITY
Start: 2022-10-14

## 2022-10-27 RX ORDER — ONDANSETRON 4 MG/1
4 TABLET, ORALLY DISINTEGRATING ORAL EVERY 6 HOURS PRN
COMMUNITY
Start: 2022-10-17

## 2022-10-27 RX ORDER — CEPHALEXIN 500 MG/1
500 CAPSULE ORAL 3 TIMES DAILY
Status: ON HOLD | COMMUNITY
Start: 2022-10-17 | End: 2022-11-03

## 2022-10-27 RX ORDER — ANASTROZOLE 1 MG/1
1 TABLET ORAL DAILY
Status: ON HOLD | COMMUNITY
Start: 2022-10-17 | End: 2022-11-03 | Stop reason: SDUPTHER

## 2022-10-27 RX ORDER — OXYCODONE HYDROCHLORIDE 5 MG/1
5 TABLET ORAL EVERY 4 HOURS PRN
COMMUNITY
Start: 2022-10-12

## 2022-10-27 RX ORDER — DOCUSATE SODIUM 100 MG/1
CAPSULE, LIQUID FILLED ORAL
COMMUNITY
Start: 2022-10-17

## 2022-10-27 NOTE — PAT
An arrival time for procedure was not provided during PAT visit. If patient had any questions or concerns about their arrival time, they were instructed to contact their surgeon/physician.  Additionally, if the patient referred to an arrival time that was acquired from their my chart account, patient was encouraged to verify that time with their surgeon/physician. Arrival times are NOT provided in Pre Admission Testing Department.    Patient viewed general PAT education video as instructed in their preoperative information received from their surgeon.  Patient stated the general PAT education video was viewed in its entirety and survey completed.  Copies of PAT general education handouts (Incentive Spirometry, Meds to Beds Program, Patient Belongings, Pre-op skin preparation instructions, Blood Glucose testing, Visitor policy, Surgery FAQ, Code H) distributed to patient if not printed. Education related to the PAT pass and skin preparation for surgery (if applicable) completed in PAT as a reinforcement to PAT education video. Patient instructed to return PAT pass provided today as well as completed skin preparation sheet (if applicable) on the day of procedure.     Additionally if patient had not viewed video yet but intended to view it at home or in our waiting area, then referred them to the handout with QR code/link provided during PAT visit.  Instructed patient to complete survey after viewing the video in its entirety.  Encouraged patient/family to read PAT general education handouts thoroughly and notify PAT staff with any questions or concerns. Patient verbalized understanding of all information and priority content.    Patient to apply Chlorhexadine wipes  to surgical area (as instructed) the night before procedure and the AM of procedure. Wipes provided.    Bactroban prescribed by physician before PAT visit. Verified with patient that medication(s) were picked up from their pharmacy. Written instructions  given to patient during PAT visit.  Patient/family also instructed to complete skin prep checklist and return the checklist on the day of surgery to preoperative staff. Patient/family verbalized understanding.    Per Anesthesia Request, patient instructed not to take their ACE/ARB medications on the AM of surgery.    Patient denies any current skin issues.

## 2022-11-02 ENCOUNTER — ANESTHESIA EVENT (OUTPATIENT)
Dept: PERIOP | Facility: HOSPITAL | Age: 62
End: 2022-11-02

## 2022-11-02 RX ORDER — SODIUM CHLORIDE 0.9 % (FLUSH) 0.9 %
10 SYRINGE (ML) INJECTION AS NEEDED
Status: CANCELLED | OUTPATIENT
Start: 2022-11-02

## 2022-11-02 RX ORDER — SODIUM CHLORIDE 0.9 % (FLUSH) 0.9 %
10 SYRINGE (ML) INJECTION EVERY 12 HOURS SCHEDULED
Status: CANCELLED | OUTPATIENT
Start: 2022-11-02

## 2022-11-02 RX ORDER — FAMOTIDINE 10 MG/ML
20 INJECTION, SOLUTION INTRAVENOUS ONCE
Status: CANCELLED | OUTPATIENT
Start: 2022-11-02 | End: 2022-11-02

## 2022-11-03 ENCOUNTER — HOSPITAL ENCOUNTER (OUTPATIENT)
Facility: HOSPITAL | Age: 62
Setting detail: HOSPITAL OUTPATIENT SURGERY
Discharge: HOME | End: 2022-11-03
Attending: PLASTIC SURGERY | Admitting: PLASTIC SURGERY

## 2022-11-03 ENCOUNTER — ANESTHESIA (OUTPATIENT)
Dept: PERIOP | Facility: HOSPITAL | Age: 62
End: 2022-11-03

## 2022-11-03 VITALS
TEMPERATURE: 98.8 F | HEIGHT: 65 IN | RESPIRATION RATE: 16 BRPM | WEIGHT: 214 LBS | DIASTOLIC BLOOD PRESSURE: 59 MMHG | HEART RATE: 92 BPM | OXYGEN SATURATION: 93 % | BODY MASS INDEX: 35.65 KG/M2 | SYSTOLIC BLOOD PRESSURE: 108 MMHG

## 2022-11-03 PROCEDURE — 0 LIDOCAINE 1 % SOLUTION 20 ML VIAL: Performed by: PLASTIC SURGERY

## 2022-11-03 PROCEDURE — 25010000002 PROPOFOL 10 MG/ML EMULSION: Performed by: NURSE ANESTHETIST, CERTIFIED REGISTERED

## 2022-11-03 PROCEDURE — 25010000002 CEFAZOLIN PER 500 MG: Performed by: PLASTIC SURGERY

## 2022-11-03 PROCEDURE — 25010000002 FENTANYL CITRATE (PF) 100 MCG/2ML SOLUTION: Performed by: NURSE ANESTHETIST, CERTIFIED REGISTERED

## 2022-11-03 PROCEDURE — 25010000002 DEXAMETHASONE PER 1 MG: Performed by: NURSE ANESTHETIST, CERTIFIED REGISTERED

## 2022-11-03 PROCEDURE — 25010000002 ONDANSETRON PER 1 MG: Performed by: NURSE ANESTHETIST, CERTIFIED REGISTERED

## 2022-11-03 PROCEDURE — 25010000002 EPINEPHRINE PER 0.1 MG: Performed by: PLASTIC SURGERY

## 2022-11-03 PROCEDURE — 25010000002 FENTANYL CITRATE (PF) 50 MCG/ML SOLUTION

## 2022-11-03 DEVICE — DEV CONTRL TISS STRATAFIX SPIRAL MNCRYL UD 3/0 PLS 30CM: Type: IMPLANTABLE DEVICE | Site: BREAST | Status: FUNCTIONAL

## 2022-11-03 RX ORDER — FENTANYL CITRATE 50 UG/ML
INJECTION, SOLUTION INTRAMUSCULAR; INTRAVENOUS AS NEEDED
Status: DISCONTINUED | OUTPATIENT
Start: 2022-11-03 | End: 2022-11-03 | Stop reason: SURG

## 2022-11-03 RX ORDER — CEFAZOLIN SODIUM 2 G/100ML
2 INJECTION, SOLUTION INTRAVENOUS ONCE
Status: DISCONTINUED | OUTPATIENT
Start: 2022-11-03 | End: 2022-11-03

## 2022-11-03 RX ORDER — BUPIVACAINE HCL/0.9 % NACL/PF 0.1 %
2 PLASTIC BAG, INJECTION (ML) EPIDURAL ONCE
Status: COMPLETED | OUTPATIENT
Start: 2022-11-03 | End: 2022-11-03

## 2022-11-03 RX ORDER — DROPERIDOL 2.5 MG/ML
0.62 INJECTION, SOLUTION INTRAMUSCULAR; INTRAVENOUS
Status: DISCONTINUED | OUTPATIENT
Start: 2022-11-03 | End: 2022-11-03 | Stop reason: HOSPADM

## 2022-11-03 RX ORDER — HYDROMORPHONE HYDROCHLORIDE 1 MG/ML
0.5 INJECTION, SOLUTION INTRAMUSCULAR; INTRAVENOUS; SUBCUTANEOUS
Status: DISCONTINUED | OUTPATIENT
Start: 2022-11-03 | End: 2022-11-03 | Stop reason: HOSPADM

## 2022-11-03 RX ORDER — DEXAMETHASONE SODIUM PHOSPHATE 4 MG/ML
INJECTION, SOLUTION INTRA-ARTICULAR; INTRALESIONAL; INTRAMUSCULAR; INTRAVENOUS; SOFT TISSUE AS NEEDED
Status: DISCONTINUED | OUTPATIENT
Start: 2022-11-03 | End: 2022-11-03 | Stop reason: SURG

## 2022-11-03 RX ORDER — FENTANYL CITRATE 50 UG/ML
50 INJECTION, SOLUTION INTRAMUSCULAR; INTRAVENOUS
Status: DISCONTINUED | OUTPATIENT
Start: 2022-11-03 | End: 2022-11-03 | Stop reason: HOSPADM

## 2022-11-03 RX ORDER — DROPERIDOL 2.5 MG/ML
0.62 INJECTION, SOLUTION INTRAMUSCULAR; INTRAVENOUS ONCE AS NEEDED
Status: DISCONTINUED | OUTPATIENT
Start: 2022-11-03 | End: 2022-11-03 | Stop reason: HOSPADM

## 2022-11-03 RX ORDER — ONDANSETRON 2 MG/ML
4 INJECTION INTRAMUSCULAR; INTRAVENOUS ONCE AS NEEDED
Status: DISCONTINUED | OUTPATIENT
Start: 2022-11-03 | End: 2022-11-03 | Stop reason: HOSPADM

## 2022-11-03 RX ORDER — IPRATROPIUM BROMIDE AND ALBUTEROL SULFATE 2.5; .5 MG/3ML; MG/3ML
3 SOLUTION RESPIRATORY (INHALATION) ONCE AS NEEDED
Status: DISCONTINUED | OUTPATIENT
Start: 2022-11-03 | End: 2022-11-03 | Stop reason: HOSPADM

## 2022-11-03 RX ORDER — SODIUM CHLORIDE, SODIUM LACTATE, POTASSIUM CHLORIDE, CALCIUM CHLORIDE 600; 310; 30; 20 MG/100ML; MG/100ML; MG/100ML; MG/100ML
9 INJECTION, SOLUTION INTRAVENOUS CONTINUOUS
Status: DISCONTINUED | OUTPATIENT
Start: 2022-11-03 | End: 2022-11-03 | Stop reason: HOSPADM

## 2022-11-03 RX ORDER — SODIUM CHLORIDE, SODIUM LACTATE, POTASSIUM CHLORIDE, CALCIUM CHLORIDE 600; 310; 30; 20 MG/100ML; MG/100ML; MG/100ML; MG/100ML
INJECTION, SOLUTION INTRAVENOUS CONTINUOUS PRN
Status: DISCONTINUED | OUTPATIENT
Start: 2022-11-03 | End: 2022-11-03 | Stop reason: SURG

## 2022-11-03 RX ORDER — LIDOCAINE HYDROCHLORIDE AND EPINEPHRINE 10; 10 MG/ML; UG/ML
INJECTION, SOLUTION INFILTRATION; PERINEURAL AS NEEDED
Status: DISCONTINUED | OUTPATIENT
Start: 2022-11-03 | End: 2022-11-03 | Stop reason: HOSPADM

## 2022-11-03 RX ORDER — HYDROCODONE BITARTRATE AND ACETAMINOPHEN 5; 325 MG/1; MG/1
TABLET ORAL
Status: DISCONTINUED
Start: 2022-11-03 | End: 2022-11-03 | Stop reason: HOSPADM

## 2022-11-03 RX ORDER — SODIUM CHLORIDE 0.9 % (FLUSH) 0.9 %
3 SYRINGE (ML) INJECTION EVERY 12 HOURS SCHEDULED
Status: DISCONTINUED | OUTPATIENT
Start: 2022-11-03 | End: 2022-11-03 | Stop reason: HOSPADM

## 2022-11-03 RX ORDER — FENTANYL CITRATE 50 UG/ML
INJECTION, SOLUTION INTRAMUSCULAR; INTRAVENOUS
Status: COMPLETED
Start: 2022-11-03 | End: 2022-11-03

## 2022-11-03 RX ORDER — LABETALOL HYDROCHLORIDE 5 MG/ML
5 INJECTION, SOLUTION INTRAVENOUS
Status: DISCONTINUED | OUTPATIENT
Start: 2022-11-03 | End: 2022-11-03 | Stop reason: HOSPADM

## 2022-11-03 RX ORDER — MAGNESIUM HYDROXIDE 1200 MG/15ML
LIQUID ORAL AS NEEDED
Status: DISCONTINUED | OUTPATIENT
Start: 2022-11-03 | End: 2022-11-03 | Stop reason: HOSPADM

## 2022-11-03 RX ORDER — EPHEDRINE SULFATE 50 MG/ML
INJECTION INTRAVENOUS AS NEEDED
Status: DISCONTINUED | OUTPATIENT
Start: 2022-11-03 | End: 2022-11-03 | Stop reason: SURG

## 2022-11-03 RX ORDER — SODIUM CHLORIDE 0.9 % (FLUSH) 0.9 %
3-10 SYRINGE (ML) INJECTION AS NEEDED
Status: DISCONTINUED | OUTPATIENT
Start: 2022-11-03 | End: 2022-11-03 | Stop reason: HOSPADM

## 2022-11-03 RX ORDER — PROPOFOL 10 MG/ML
VIAL (ML) INTRAVENOUS AS NEEDED
Status: DISCONTINUED | OUTPATIENT
Start: 2022-11-03 | End: 2022-11-03 | Stop reason: SURG

## 2022-11-03 RX ORDER — LIDOCAINE HYDROCHLORIDE 10 MG/ML
INJECTION, SOLUTION EPIDURAL; INFILTRATION; INTRACAUDAL; PERINEURAL AS NEEDED
Status: DISCONTINUED | OUTPATIENT
Start: 2022-11-03 | End: 2022-11-03 | Stop reason: SURG

## 2022-11-03 RX ORDER — MEPERIDINE HYDROCHLORIDE 25 MG/ML
12.5 INJECTION INTRAMUSCULAR; INTRAVENOUS; SUBCUTANEOUS
Status: DISCONTINUED | OUTPATIENT
Start: 2022-11-03 | End: 2022-11-03 | Stop reason: HOSPADM

## 2022-11-03 RX ORDER — FAMOTIDINE 20 MG/1
20 TABLET, FILM COATED ORAL ONCE
Status: COMPLETED | OUTPATIENT
Start: 2022-11-03 | End: 2022-11-03

## 2022-11-03 RX ORDER — DEXMEDETOMIDINE HYDROCHLORIDE 100 UG/ML
INJECTION, SOLUTION INTRAVENOUS AS NEEDED
Status: DISCONTINUED | OUTPATIENT
Start: 2022-11-03 | End: 2022-11-03 | Stop reason: SURG

## 2022-11-03 RX ORDER — LIDOCAINE HYDROCHLORIDE 10 MG/ML
0.5 INJECTION, SOLUTION EPIDURAL; INFILTRATION; INTRACAUDAL; PERINEURAL ONCE AS NEEDED
Status: COMPLETED | OUTPATIENT
Start: 2022-11-03 | End: 2022-11-03

## 2022-11-03 RX ORDER — ONDANSETRON 2 MG/ML
INJECTION INTRAMUSCULAR; INTRAVENOUS AS NEEDED
Status: DISCONTINUED | OUTPATIENT
Start: 2022-11-03 | End: 2022-11-03 | Stop reason: SURG

## 2022-11-03 RX ORDER — PROMETHAZINE HYDROCHLORIDE 25 MG/1
25 SUPPOSITORY RECTAL ONCE AS NEEDED
Status: DISCONTINUED | OUTPATIENT
Start: 2022-11-03 | End: 2022-11-03 | Stop reason: HOSPADM

## 2022-11-03 RX ORDER — SCOLOPAMINE TRANSDERMAL SYSTEM 1 MG/1
1 PATCH, EXTENDED RELEASE TRANSDERMAL ONCE
Status: DISCONTINUED | OUTPATIENT
Start: 2022-11-03 | End: 2022-11-03 | Stop reason: HOSPADM

## 2022-11-03 RX ORDER — NALOXONE HCL 0.4 MG/ML
0.4 VIAL (ML) INJECTION AS NEEDED
Status: DISCONTINUED | OUTPATIENT
Start: 2022-11-03 | End: 2022-11-03 | Stop reason: HOSPADM

## 2022-11-03 RX ORDER — PROMETHAZINE HYDROCHLORIDE 25 MG/1
25 TABLET ORAL ONCE AS NEEDED
Status: DISCONTINUED | OUTPATIENT
Start: 2022-11-03 | End: 2022-11-03 | Stop reason: HOSPADM

## 2022-11-03 RX ORDER — HYDROCODONE BITARTRATE AND ACETAMINOPHEN 5; 325 MG/1; MG/1
1 TABLET ORAL ONCE AS NEEDED
Status: DISCONTINUED | OUTPATIENT
Start: 2022-11-03 | End: 2022-11-03 | Stop reason: HOSPADM

## 2022-11-03 RX ORDER — HYDRALAZINE HYDROCHLORIDE 20 MG/ML
5 INJECTION INTRAMUSCULAR; INTRAVENOUS
Status: DISCONTINUED | OUTPATIENT
Start: 2022-11-03 | End: 2022-11-03 | Stop reason: HOSPADM

## 2022-11-03 RX ADMIN — FENTANYL CITRATE 50 MCG: 50 INJECTION, SOLUTION INTRAMUSCULAR; INTRAVENOUS at 13:12

## 2022-11-03 RX ADMIN — Medication 2 G: at 10:38

## 2022-11-03 RX ADMIN — PROPOFOL 50 MG: 10 INJECTION, EMULSION INTRAVENOUS at 10:46

## 2022-11-03 RX ADMIN — DEXMEDETOMIDINE HYDROCHLORIDE 8 MCG: 100 INJECTION, SOLUTION INTRAVENOUS at 10:31

## 2022-11-03 RX ADMIN — SCOPALAMINE 1 PATCH: 1 PATCH, EXTENDED RELEASE TRANSDERMAL at 09:23

## 2022-11-03 RX ADMIN — FENTANYL CITRATE 50 MCG: 50 INJECTION, SOLUTION INTRAMUSCULAR; INTRAVENOUS at 10:30

## 2022-11-03 RX ADMIN — PROPOFOL 180 MG: 10 INJECTION, EMULSION INTRAVENOUS at 10:34

## 2022-11-03 RX ADMIN — EPHEDRINE SULFATE 5 MG: 50 INJECTION INTRAVENOUS at 10:45

## 2022-11-03 RX ADMIN — LIDOCAINE HYDROCHLORIDE 50 MG: 10 INJECTION, SOLUTION EPIDURAL; INFILTRATION; INTRACAUDAL; PERINEURAL at 10:34

## 2022-11-03 RX ADMIN — EPHEDRINE SULFATE 10 MG: 50 INJECTION INTRAVENOUS at 11:12

## 2022-11-03 RX ADMIN — SODIUM CHLORIDE, POTASSIUM CHLORIDE, SODIUM LACTATE AND CALCIUM CHLORIDE: 600; 310; 30; 20 INJECTION, SOLUTION INTRAVENOUS at 10:34

## 2022-11-03 RX ADMIN — ONDANSETRON 4 MG: 2 INJECTION INTRAMUSCULAR; INTRAVENOUS at 11:33

## 2022-11-03 RX ADMIN — SODIUM CHLORIDE, POTASSIUM CHLORIDE, SODIUM LACTATE AND CALCIUM CHLORIDE 9 ML/HR: 600; 310; 30; 20 INJECTION, SOLUTION INTRAVENOUS at 09:24

## 2022-11-03 RX ADMIN — EPHEDRINE SULFATE 10 MG: 50 INJECTION INTRAVENOUS at 11:30

## 2022-11-03 RX ADMIN — EPHEDRINE SULFATE 10 MG: 50 INJECTION INTRAVENOUS at 10:52

## 2022-11-03 RX ADMIN — DEXAMETHASONE SODIUM PHOSPHATE 8 MG: 4 INJECTION, SOLUTION INTRAMUSCULAR; INTRAVENOUS at 10:44

## 2022-11-03 RX ADMIN — HYDROCODONE BITARTRATE AND ACETAMINOPHEN 1 TABLET: 5; 325 TABLET ORAL at 16:05

## 2022-11-03 RX ADMIN — EPHEDRINE SULFATE 10 MG: 50 INJECTION INTRAVENOUS at 10:42

## 2022-11-03 RX ADMIN — LIDOCAINE HYDROCHLORIDE 0.5 ML: 10 INJECTION, SOLUTION EPIDURAL; INFILTRATION; INTRACAUDAL; PERINEURAL at 09:23

## 2022-11-03 RX ADMIN — EPHEDRINE SULFATE 5 MG: 50 INJECTION INTRAVENOUS at 11:21

## 2022-11-03 RX ADMIN — PROPOFOL 25 MCG/KG/MIN: 10 INJECTION, EMULSION INTRAVENOUS at 10:51

## 2022-11-03 RX ADMIN — FENTANYL CITRATE 50 MCG: 50 INJECTION, SOLUTION INTRAMUSCULAR; INTRAVENOUS at 10:34

## 2022-11-03 RX ADMIN — FAMOTIDINE 20 MG: 20 TABLET ORAL at 09:23

## 2022-11-03 RX ADMIN — PROPOFOL 50 MG: 10 INJECTION, EMULSION INTRAVENOUS at 10:44

## 2022-11-03 NOTE — ANESTHESIA PREPROCEDURE EVALUATION
Anesthesia Evaluation     Patient summary reviewed and Nursing notes reviewed   NPO Solid Status: > 8 hours  NPO Liquid Status: > 2 hours           Airway   Mallampati: II  TM distance: >3 FB  Neck ROM: full  No difficulty expected  Dental      Pulmonary    (-) asthma, shortness of breath, recent URI, sleep apnea, not a smoker, no home oxygen    ROS comment: Hx sarcoid   Cardiovascular     ECG reviewed    (+) hypertension,   (-) past MI, dysrhythmias, angina, cardiac stents    ROS comment: ECG NSR     Neuro/Psych  (-) seizures, CVA  GI/Hepatic/Renal/Endo      Musculoskeletal     Abdominal    Substance History      OB/GYN          Other   arthritis,    history of cancer (L breast ca)        Phys Exam Other: Mac 3 grade 1 view Paige 2021`                Anesthesia Plan    ASA 3     general     (Propofol infusion as part of an anti PONV technique)  intravenous induction     Anesthetic plan, risks, benefits, and alternatives have been provided, discussed and informed consent has been obtained with: patient.    Plan discussed with CRNA.        CODE STATUS:

## 2022-11-03 NOTE — BRIEF OP NOTE
FAT GRAFTING, NIPPLE RECONSTRUCTION  Progress Note    Sina Greenfield  11/3/2022    Pre-op Diagnosis:   * Acquired absence of breast and absent nipple, bilateral [Z90.13]     * Personal history of breast cancer [Z85.3]     * Breast asymmetry [N64.89]     * Disorder of breast implant [T85.9XXA]       Post-Op Diagnosis Codes:     * Acquired absence of breast and absent nipple, bilateral [Z90.13]     * Personal history of breast cancer [Z85.3]     * Breast asymmetry [N64.89]     * Disorder of breast implant [T85.9XXA]    Procedure/CPT® Codes:  WA NIPPLE/AREOLA RECONSTRUCTION [75146]  WA GRAFTING OF AUTOLOGOUS FAT BY LIPO 50 CC OR LESS [21559]  WA GRAFTING OF AUTOLOGOUS FAT BY LIPO EA ADDL 50 CC [89911]  Complex closure left reconstructed breast measuring 5 cm [id number]    Procedure(s):  BILATERAL BREAST REVISION WITH FAT GRAFTING  NIPPLE RECONSTRUCTION- LEFT        Surgeon(s):  Mic Hannah MD    Anesthesia: General    Staff:   Circulator: Mati Urrutia RN; Carola Persaud RN  Physician Assistant: Josi Chavez PA  Scrub Person: Henny Qureshi         Estimated Blood Loss: minimal    Urine Voided: * No values recorded between 11/3/2022 10:28 AM and 11/3/2022 12:04 PM *    Specimens:                None          Drains: * No LDAs found *    Findings: absent breast with malrotated left implant        Complications: none immediate          Mic Hannah MD     Date: 11/3/2022  Time: 12:04 EDT

## 2022-11-03 NOTE — OP NOTE
Preoperative diagnosis: 1.  Personal history of breast cancer  2.  Bilateral absent breast nipple areolar complex  3.  Breast asymmetry  4.  Malrotation left reconstructed breast implant    Postoperative diagnosis: 1.  Personal history of breast cancer  2.  Bilateral absent breast nipple areolar complex  3.  Breast asymmetry  4.  Malrotation left reconstructed breast implant    Surgeon: Mic Hannah MD    Assistant: Azul BLOOD was responsible for performing the following activities: Retraction, Suction, Irrigation, Suturing, Closing and Placing Dressing and their skilled assistance was necessary for the success of this case.    Anesthesia: General    Procedure: 1.  Bilateral revision of her reconstructed breast with placement of fat graft.   150 cc of fat was injected on the left side and 140 cc of fat was injected on the right side  2.  Left nipple reconstruction with a modified star flap  3.  Derotation left reconstructed breast implant  4.  Complex closure 5 cm left reconstructed breast    Indication: The patient is a 62-year-old white female who presented to my office with a diagnosis of breast cancer.  She is since undergone bilateral mastectomy and bilateral implant-based breast reconstruction.  She is noted to have absent left nipple areolar complex.  I recommended nipple reconstruction with a modified star flap.  She is also noted to have breast asymmetries as well as volume deficiencies in her bilateral medial, superior medial, superior, superior lateral, and lateral poles of her reconstructed breast.  I have recommended fat grafting.  Finally, in the operating room, her implant was noted to be malrotated on the left side.  I discussed with her  on the phone and he was agreeable to making a small incision on her left side to derotate the implant.  All techniques potential complications and typical postoperative course were discussed with the patient.  She indicated her understanding wished to  proceed.    Findings: 1.  absent left breast and nipple areolar complex  2.  Breast asymmetries and deficiencies as noted above  3.  Malrotated left reconstructed breast implant    Description: The patient was taken from preoperative holding to the operating room after informed consent was signed on the chart and placed under general anesthesia successfully.  Prior to induction of anesthesia, the patient received a prophylactic dose of antibiotics and had bilateral lower extremity sequential compression devices in place and operational.  She was placed supine on the operating table.  She did pill place meet her knees.  Her arms are gently abducted to 90 degrees and she had ulnar nerve padding.  Her chest wall, abdomen, and thighs were prepped and draped in the usual sterile fashion.  After properly identifying the patient the patient's problem, bilateral lower quadrant, hip, and inguinal stab incisions were made with a 15 blade.  Tumescent solution consisting of 1 L of LR, 1 ampoule of epinephrine, and 30 cc of 1% plain lidocaine were injected into her abdomen and flanks.  Next, a 3.7 mm accelerator cannula was used to harvest fat into a sterile Revolve system.  The fat was washed 3 times with warm lactated Ringer solution.  The liquid was removed into the vacuum.  The fat was then transferred into 20 cc syringes.  Next, bilateral medial and lateral inframammary crease stab incisions were made with a 15 blade.  Fat was then injected into the aforementioned areas utilizing a 2 mm Lyons cannula.  150 cc of fat was injected on the left and 140 cc of fat was injected on the right in various amounts in the various aforementioned areas.  Next, a left inframammary crease incision was marked.  It was injected with lidocaine with epinephrine.  A 10 blade was used to incise the skin.  The subcutaneous tissue was dissected with electrocautery beveling in a cephalad direction.  The AlloDerm was encountered.  He was elevated off  the implant and penetrated with electrocautery.  This capsulotomy was then extended medially and laterally by protecting with a hemostat.  The implant was palpated and noted to be malrotated.  The implant was then derotated.  The AlloDerm was closed with 3-0 Vicryl suture in horizontal mattress fashion.  The superficial fascia was closed with 3-0 Vicryl suture in a simple interrupted fashion.  The skin was then closed with 3-0 Monocryl suture in a deep dermal buried interrupted fashion and 3-0 STRATAFIX suture in an intracuticular fashion.  The length of this incision measured 5 cm.  Finally, a modified star flap was marked on the left reconstructed breast.  The base on the right side was oriented in a cephalad direction.  It was marked at the preplanned location.  It was incised with a 15 blade and elevated with scissors.  The donor site was closed with 3-0 Monocryl suture in a deep dermal buried interrupted fashion and 5-0 nylon suture in a simple running fashion.  The flap was inset with 5-0 chromic suture in a simple interrupted fashion.  All fat grafting and liposuction access sites were closed with 5-0 fast-absorbing plain gut suture in a simple interrupted fashion.  2 x 2 covered arms were applied to these areas.  The patient's chest wall was dressed with Telfa pad, 4 x 4's, and tape.  The inframammary crease incision was dressed with tissue glue.  A surgical bra was applied.  The patient's abdomen was dressed with a abdominal binder.  The case was turned over to anesthesia which point the patient was awoken from general anesthesia successfully and taken to PACU in stable condition.  Was present for the entire procedure.  All counts were correct.    Estimated blood loss: Minimal    Drains: None    Complications: None immediate

## 2022-11-03 NOTE — ANESTHESIA PROCEDURE NOTES
Airway  Urgency: elective    Date/Time: 11/3/2022 10:36 AM  Airway not difficult    General Information and Staff    Patient location during procedure: OR  CRNA/CAA: Rosa Wiseman CRNA    Indications and Patient Condition  Indications for airway management: airway protection    Preoxygenated: yes  Mask difficulty assessment: 1 - vent by mask    Final Airway Details  Final airway type: supraglottic airway      Successful airway: I-gel  Size 4     Number of attempts at approach: 1  Assessment: lips, teeth, and gum same as pre-op    Additional Comments  LMA placed without difficulty, ventilation with assist, equal breath sounds and symmetric chest rise and fall

## 2022-11-03 NOTE — ANESTHESIA POSTPROCEDURE EVALUATION
Patient: Sina Greenfield    Procedure Summary     Date: 11/03/22 Room / Location:  [facility]    Anesthesia Start: 1028 Anesthesia Stop: 1252    Procedures:       BILATERAL BREAST REVISION WITH FAT GRAFTING (Bilateral)      NIPPLE RECONSTRUCTION- LEFT (Left: Breast) Diagnosis:       Acquired absence of breast and absent nipple, bilateral      Personal history of breast cancer      Breast asymmetry      Disorder of breast implant    Surgeons: Mic Hannah MD Provider: Jamal Edwards MD    Anesthesia Type: general ASA Status: 3          Anesthesia Type: general    Vitals  No vitals data found for the desired time range.          Post Anesthesia Care and Evaluation    Patient location during evaluation: PACU  Patient participation: complete - patient participated  Level of consciousness: awake and alert  Pain management: adequate    Airway patency: patent  Anesthetic complications: No anesthetic complications  PONV Status: none  Cardiovascular status: hemodynamically stable and acceptable  Respiratory status: nonlabored ventilation, acceptable and nasal cannula  Hydration status: acceptable

## 2022-11-03 NOTE — H&P
Pre-Op H&P  Sina Greenfield  [id number]  1960      Chief complaint: Hx breast cancer      Subjective:  Patient is a 62 y.o.female presents for scheduled surgery by Dr. Hannah. She anticipates a BILATERAL BREAST REVISION WITH FAT GRAFTING; NIPPLE RECONSTRUCTION- LEFT today. She underwent mastectomy on 10/14/21 and has had difficulty with healing with her left nipple. On 10/28/21, she had left breast nipple debridement and complex closure. On 2/24/22, she had bilateral breast tissue expanders exchange to permanent implants, and bilateral breast fat grafting.       Review of Systems:  Constitutional-- No fever, chills or sweats. No fatigue.  CV-- No chest pain, palpitation or syncope. +HTN, HLD  Resp-- No SOB, cough, hemoptysis  Skin--No rashes or lesions      Allergies: No Known Allergies      Home Meds:  Medications Prior to Admission   Medication Sig Dispense Refill Last Dose   • anastrozole (ARIMIDEX) 1 MG tablet TAKE 1 TABLET BY MOUTH EVERY DAY 90 tablet 3 11/2/2022   • atorvastatin (LIPITOR) 10 MG tablet Take 10 mg by mouth Daily.   11/2/2022   • Calcium-Magnesium-Vitamin D (CALCIUM 1200+D3 PO) Take 1 tablet by mouth Daily.   11/2/2022   • escitalopram (LEXAPRO) 5 MG tablet Take 5 mg by mouth Daily.   11/2/2022   • losartan (COZAAR) 50 MG tablet Take 1 tablet by mouth Every Morning.   11/2/2022   • multivitamin with minerals tablet tablet Take 1 tablet by mouth Daily.   11/2/2022   • mupirocin (BACTROBAN) 2 % ointment APPLY TO AFFECTED AREA TWICE DAILY - START 5 DAYS BEFORE SURGERY   11/2/2022   • valACYclovir (VALTREX) 1000 MG tablet Take 1 tablet by mouth As Needed.   Past Month   • docusate sodium (COLACE) 100 MG capsule TAKE 1 CAPSULE BY MOUTH TWICE A DAY AS NEEDED      • ondansetron ODT (ZOFRAN-ODT) 4 MG disintegrating tablet 1 tablet Every 6 (Six) Hours As Needed.   More than a month   • oxyCODONE (ROXICODONE) 5 MG immediate release tablet Take 1 tablet by mouth Every 4 (Four) Hours As Needed.   More  "than a month         PMH:   Past Medical History:   Diagnosis Date   • 6 mm noncalcified right lower lobe nodule 2021   • Anxiety    • Arthritis    • COVID-19 2021   • Fracture of foot bone, left, closed    • Hx of skin cancer, basal cell    • Hypertension    • Malignant neoplasm of upper-outer quadrant of left female breast (HCC) 10/14/2021   • Sarcoidosis     10 years ago   • Wears reading eyeglasses      PSH:    Past Surgical History:   Procedure Laterality Date   • BREAST ABSCESS INCISION AND DRAINAGE Left 10/28/2021    Procedure: DEBRIDEMENT LEFT BREAST NIPPLE;  Surgeon: Mic Hannah MD;  Location:  [facility];  Service: Plastics;  Laterality: Left;   • BREAST RECONSTRUCTION, BREAST TISSUE EXPANDER INSERTION Bilateral 10/14/2021    Procedure: IMMEDIATE BILATERAL BREAST RECONSTRUCTION WITH TISSUE EXPANDERS AND ALLODERM;  Surgeon: Mic Hannah MD;  Location:  [facility];  Service: Plastics;  Laterality: Bilateral;   • BREAST RECONSTRUCTION, BREAST TISSUE EXPANDER REMOVAL, IMPLANT INSERTION Bilateral 2022    Procedure: BREAST RECONSTRUCTION, BREAST TISSUE EXPANDER EXCHANGE TO PERMANENT IMPLANTS BILATERAL;  Surgeon: Mic Hannah MD;  Location:  [facility];  Service: Plastics;  Laterality: Bilateral;   • BREAST SURGERY Left 10/28/2021    Procedure: COMPLEX CLOSURE LEFT;  Surgeon: Mic Hannah MD;  Location:  [facility];  Service: Plastics;  Laterality: Left;   • CARPAL TUNNEL RELEASE Bilateral    •  SECTION      with ovaraian cyst removed and found \"borderline cells\" in the cyst followed and released 6 years ago   • COLONOSCOPY     • FOOT SURGERY Left     hardware   • KNEE CARTILAGE SURGERY Left    • LYMPH NODE BIOPSY         • MASTECTOMY W/ SENTINEL NODE BIOPSY Bilateral 10/14/2021    Procedure: SKIN SPARING MASTECTOMY, LEFT SENITNEL NODE BIOPSY, RIGHT PROPHYLACTIC SKIN SPARING MASTECTOMY;  Surgeon: Sylvester Mc MD;  Location:  [facility];  Service: General;  " "Laterality: Bilateral;   • ORIF FOOT FRACTURE Left 2021   • TOTAL KNEE ARTHROPLASTY Left        Immunization History:  Influenza: 2022  Pneumococcal: No  Tetanus: UTD  Covid x5: 2022    Social History:   Tobacco:   Social History     Tobacco Use   Smoking Status Never   Smokeless Tobacco Never      Alcohol:     Social History     Substance and Sexual Activity   Alcohol Use Yes   • Alcohol/week: 2.0 standard drinks   • Types: 1 Glasses of wine, 1 Drinks containing 0.5 oz of alcohol per week    Comment: 1-2 drink a week         Physical Exam:/78 (BP Location: Right arm, Patient Position: Lying)   Pulse 80   Temp 97.8 °F (36.6 °C) (Temporal)   Resp 16   Ht 163.8 cm (64.5\")   Wt 97.1 kg (214 lb)   LMP  (LMP Unknown)   SpO2 96%   BMI 36.17 kg/m²       General Appearance:    Alert, cooperative, no distress, appears stated age   Head:    Normocephalic, without obvious abnormality, atraumatic   Lungs:     Clear to auscultation bilaterally, respirations unlabored    Heart:   Regular rate and rhythm, S1 and S2 normal    Abdomen:    Soft without tenderness   Extremities:   Extremities normal, atraumatic, no cyanosis or edema   Skin:   Skin color, texture, turgor normal, no rashes or lesions   Neurologic:   Grossly intact     Results Review:     LABS:  Lab Results   Component Value Date    WBC 6.96 10/27/2022    HGB 13.0 10/27/2022    HCT 39.5 10/27/2022    MCV 94.7 10/27/2022     10/27/2022    GLUCOSE 106 (H) 10/27/2022    BUN 18 10/27/2022    CREATININE 0.89 10/27/2022    EGFRIFNONA 72 02/22/2022     10/27/2022    K 4.6 10/27/2022     10/27/2022    CO2 28.0 10/27/2022    CALCIUM 9.4 10/27/2022    ALBUMIN 4.60 11/18/2021    AST 24 11/18/2021    ALT 27 11/18/2021    BILITOT 0.2 11/18/2021       RADIOLOGY:  Imaging Results (Last 72 Hours)     ** No results found for the last 72 hours. **          I reviewed the patient's new clinical results.    Cancer Staging (if applicable)  Cancer Patient: __ " yes __no __unknown; If yes, clinical stage T:__ N:__M:__, stage group or __N/A      Impression: [id number] ER+ FL+ Her2 negative invasive ductal carcinoma of the left breast      Plan: BILATERAL BREAST REVISION WITH FAT GRAFTING; NIPPLE RECONSTRUCTION- LEFT      Cynthia Yuen, APRN   11/3/2022   09:19 EDT

## 2023-01-25 ENCOUNTER — OFFICE VISIT (OUTPATIENT)
Dept: PSYCHIATRY | Facility: CLINIC | Age: 63
End: 2023-01-25
Payer: COMMERCIAL

## 2023-01-25 DIAGNOSIS — F41.1 GAD (GENERALIZED ANXIETY DISORDER): Primary | ICD-10-CM

## 2023-01-25 DIAGNOSIS — G47.9 SLEEP DISTURBANCE: ICD-10-CM

## 2023-01-25 PROCEDURE — 90837 PSYTX W PT 60 MINUTES: CPT | Performed by: NURSE PRACTITIONER

## 2023-01-25 NOTE — PROGRESS NOTES
"  Subjective   Sina Greenfield is a 62 y.o. female who is here in person with Covid precautions taken for therapy. She is s/p bilateral mastectomies and reconstruction for breast cancer. She is on an anastrozole for at least 8 years. Dr. Morris is her medical oncologist.     TIME IN:0953  TIME OUT:1100      Chief Complaint: ALEXIS, AND SLEEP DISTURBANCE     Therapy:  Start Time: 0953   Stop Time:  1100   (60 ) minutes was spent for psychotherapy. Assisted patient in processing patient's ALEXIS. Acknowledged and normalized patient's thoughts, feelings, and concerns. Utilized cognitive behavioral therapy to assist the patient in recognizing more appropriate coping mechanisms when she  becomes agitated/anxious which are proven effective in reducing the severity of frequency of symptoms.     CLINICAL MANUEVERING/INTERVENTION:   Patient talked about current stressors, primarily fears of recurrence, \"survivors guilt\" and trying to live in the moment vs the fears. Venting of concerns and examples of fears was conducted. Feelings were processed and validated, both negative and positive. Flushing out worries and concerns was conducted in order to diminish emotional tension. Reviewed what she can control and what she can't, discussed medical surveillance and trust of her medical team . Ways in which patient does take stress off herself in a purposeful manner was discussed. Patient was assisted in 'talking out' what she may do if health continues to be a challenge ie muscle pain, keeping in mind the notion that there is typically a solution to any given problem. Utilized motivational interviewing techniques including complex reflections to attempt to assist the patient and focusing on the positive and to maintain and encourage calm outlook. Patient has been enjoying travel with her daughter past summer to [place] and has plans for [place] in April, August and [place] for [employer] as her daughter teaches English in [place]. The patient " expressed gratitude for today's session and said that counseling helps her feel better.      The following portions of the patient's history were reviewed and updated as appropriate: allergies, current medications, past family history, past medical history, past social history, past surgical history and problem list.    Review of Systems  A 14 point review of systems was performed and is negative except as noted above.    Objective   Physical Exam  not currently breastfeeding.    No Known Allergies    Current Medications:   Current Outpatient Medications   Medication Sig Dispense Refill   • anastrozole (ARIMIDEX) 1 MG tablet TAKE 1 TABLET BY MOUTH EVERY DAY 90 tablet 3   • atorvastatin (LIPITOR) 10 MG tablet Take 10 mg by mouth Daily.     • Calcium-Magnesium-Vitamin D (CALCIUM 1200+D3 PO) Take 1 tablet by mouth Daily.     • docusate sodium (COLACE) 100 MG capsule TAKE 1 CAPSULE BY MOUTH TWICE A DAY AS NEEDED     • escitalopram (LEXAPRO) 5 MG tablet Take 5 mg by mouth Daily.     • losartan (COZAAR) 50 MG tablet Take 1 tablet by mouth Every Morning.     • multivitamin with minerals tablet tablet Take 1 tablet by mouth Daily.     • mupirocin (BACTROBAN) 2 % ointment APPLY TO AFFECTED AREA TWICE DAILY - START 5 DAYS BEFORE SURGERY     • ondansetron ODT (ZOFRAN-ODT) 4 MG disintegrating tablet 1 tablet Every 6 (Six) Hours As Needed.     • oxyCODONE (ROXICODONE) 5 MG immediate release tablet Take 1 tablet by mouth Every 4 (Four) Hours As Needed.     • valACYclovir (VALTREX) 1000 MG tablet Take 1 tablet by mouth As Needed.       No current facility-administered medications for this visit.       Lab Results:  Pre-Admission Testing on 10/27/2022   Component Date Value Ref Range Status   • WBC 10/27/2022 6.96  3.40 - 10.80 10*3/mm3 Final   • RBC 10/27/2022 4.17  3.77 - 5.28 10*6/mm3 Final   • Hemoglobin 10/27/2022 13.0  12.0 - 15.9 g/dL Final   • Hematocrit 10/27/2022 39.5  34.0 - 46.6 % Final   • MCV 10/27/2022 94.7  79.0 -  97.0 fL Final   • MCH 10/27/2022 31.2  26.6 - 33.0 pg Final   • MCHC 10/27/2022 32.9  31.5 - 35.7 g/dL Final   • RDW 10/27/2022 12.8  12.3 - 15.4 % Final   • RDW-SD 10/27/2022 44.2  37.0 - 54.0 fl Final   • MPV 10/27/2022 9.0  6.0 - 12.0 fL Final   • Platelets 10/27/2022 264  140 - 450 10*3/mm3 Final   • Glucose 10/27/2022 106 (H)  65 - 99 mg/dL Final   • BUN 10/27/2022 18  8 - 23 mg/dL Final   • Creatinine 10/27/2022 0.89  0.57 - 1.00 mg/dL Final   • Sodium 10/27/2022 141  136 - 145 mmol/L Final   • Potassium 10/27/2022 4.6  3.5 - 5.2 mmol/L Final    Slight hemolysis detected by analyzer. Results may be affected.   • Chloride 10/27/2022 103  98 - 107 mmol/L Final   • CO2 10/27/2022 28.0  22.0 - 29.0 mmol/L Final   • Calcium 10/27/2022 9.4  8.6 - 10.5 mg/dL Final   • BUN/Creatinine Ratio 10/27/2022 20.2  7.0 - 25.0 Final   • Anion Gap 10/27/2022 10.0  5.0 - 15.0 mmol/L Final   • eGFR 10/27/2022 74.3  >60.0 mL/min/1.73 Final    National Kidney Foundation and American Society of Nephrology (ASN) Task Force recommended calculation based on the Chronic Kidney Disease Epidemiology Collaboration (CKD-EPI) equation refit without adjustment for race.   • QT Interval 10/27/2022 354  ms Final   • QTC Interval 10/27/2022 403  ms Final       ALEXIS-7:    Over the last two weeks, how often have you been bothered by the following problems?  Feeling nervous, anxious or on edge: Several days  Not being able to stop or control worrying: Not at all  Worrying too much about different things: Not at all  Trouble Relaxing: Not at all  Being so restless that it is hard to sit still: Not at all  Becoming easily annoyed or irritable: Not at all  Feeling afraid as if something awful might happen: Not at all  ALEXIS 7 Total Score: 1  If you checked any problems, how difficult have these problems made it for you to do your work, take care of things at home, or get along with other people: Not difficult at all  0-4: Minimal anxiety  5-9: Mild  anxiety  10-14: Moderate anxiety  15-21: Severe anxiety  PHQ-9:  PHQ-2/PHQ-9 Depression Screening 1/25/2023   Retired PHQ-9 Total Score -   Retired Total Score -   Little Interest or Pleasure in Doing Things 0-->not at all   Feeling Down, Depressed or Hopeless 0-->not at all   PHQ-9: Brief Depression Severity Measure Score 0      5-9: Minimal symptoms  10-14: Major depression mild  15-19: Major depression moderate  Greater then 20: Major depression severe    Appearance: WNL  Hygiene:   good  Cooperation:  Cooperative  Eye Contact:    Psychomotor Behavior:  denies psychomotor agitation/retardation, No EPS, No motor tics  Mood:  within normal limits  Affect:    Hopelessness: Denies  Speech:  Normal  Thought Process:  Linear  Thought Content:  Normal  Concentration: Normal   Suicidal: denies  Homicidal:  None  Hallucinations:  None  Delusion:  None  Memory:  Intact  Orientation:  Person, Place, Time and Situation  Reliability:  good  Insight:  Fair  Judgement: good  Impulse Control: good  Estimated Intelligence: average range    AUBREY REVIEWED NO RED FLAGS    Assessment & Plan   Diagnoses and all orders for this visit:    1. ALEXIS (generalized anxiety disorder) (Primary)    2. Sleep disturbance        PLAN:      Counseled patient regarding multimodal approach with encouragement of healthy nutrition, healthy sleep, regular physical mobility, social involvement, counseling, and medication compliance.     Assisted patient in identifying risk factors which would indicate the need for higher level of care including thoughts to harm self or others and/or self-harming behavior and encouraged patient to contact this office, call 911, or present to the nearest emergency room should any of these events occur. Discussed crisis intervention services and means to access.  Patient adamantly and convincingly denies current suicidal or homicidal ideation or perceptual disturbance.    Treatment Plan: stabilize mood, patient will stay  out of psychiatric hospital and be at optimal level of functioning with therapy and take all medication as prescribed. Patient verbalized  understanding and agreement to plan.    Instructed to call for questions or concerns and return early if necessary.     Greater than 50% time was spent in coordination of care, and counseling the patient regarding current assessment, symptoms, plan of care going forward, supportive therapy.  Answered any questions patient had regarding medications and plan of care.    Return if symptoms worsen or fail to improve.

## 2023-01-26 ENCOUNTER — OFFICE VISIT (OUTPATIENT)
Dept: ONCOLOGY | Facility: CLINIC | Age: 63
End: 2023-01-26
Payer: COMMERCIAL

## 2023-01-26 VITALS
TEMPERATURE: 98.1 F | OXYGEN SATURATION: 98 % | BODY MASS INDEX: 36.32 KG/M2 | RESPIRATION RATE: 20 BRPM | HEART RATE: 88 BPM | SYSTOLIC BLOOD PRESSURE: 149 MMHG | DIASTOLIC BLOOD PRESSURE: 76 MMHG | HEIGHT: 65 IN | WEIGHT: 218 LBS

## 2023-01-26 DIAGNOSIS — C50.412 MALIGNANT NEOPLASM OF UPPER-OUTER QUADRANT OF LEFT BREAST IN FEMALE, ESTROGEN RECEPTOR POSITIVE: Primary | ICD-10-CM

## 2023-01-26 DIAGNOSIS — Z17.0 MALIGNANT NEOPLASM OF UPPER-OUTER QUADRANT OF LEFT BREAST IN FEMALE, ESTROGEN RECEPTOR POSITIVE: Primary | ICD-10-CM

## 2023-01-26 PROCEDURE — 99214 OFFICE O/P EST MOD 30 MIN: CPT | Performed by: INTERNAL MEDICINE

## 2023-01-26 NOTE — LETTER
January 26, 2023       No Recipients    Patient: Sina Greenfield   YOB: 1960   Date of Visit: 1/26/2023       Dear Jael Jeffrey MD    Sina Greenfield was in my office today. Below is a copy of my note.    If you have questions, please do not hesitate to call me. I look forward to following Sina along with you.         Sincerely,        Jose Morris MD        CC:   No Recipients    CHIEF COMPLAINT: No new somatic complaints    Problem List:  Oncology/Hematology History Overview Note   1. [id number] ER+ TX+ Her2 negative invasive ductal carcinoma of the left breast with low risk Oncotype score 16.  Began adjuvant Arimidex 11/2021  A) bilateral mastectomy on 10/14/21.  Pathology showed a 1.5 cm high grade IDC.  0/2 SLN involved.  2. Hypertension  3. Sarcoidosis diagnosed on mediastinoscopy never required treatment.  4. Anxiety  5. History of atypical cyst found in ovarian cyst shortly after birth of her daughter 21 years ago followed with serial exams.      Oncology history timeline:  -10/1/2021 CT chestShow 6 mm pleural-based nodule right lung base for which continued follow-up in 6 months is recommended.  Dr. Schmid states that the enlargement of the lymph nodes within the hilar regions and mediastinum can easily be related to in part of her known sarcoidosis.  PET canceled  -10/14/2021  Right breast prophylactic skin sparing mastectomy benign.  Left breast skin sparing nipple sparing mastectomy invasive ductal 1.5 cm carcinoma Thatcher grade 3 wide margins.  0 out of 2 left sentinel nodes involved.  [place] 8 out of 9.  No lymph vascular invasion.  ER 95% 3+  TX 1-5% 3+  HER-2/will 5% 1+  Oncotype score 16.  4% distant recurrence risk at 9 years on hormone blockade alone.  Less than 1% adjuvant chemotherapeutic benefit.    -10/27/2021 consultation Dr. Krysten Anguiano: She discussedthe use of the Oncotype recurrence score.  This test provides information about the biology and risk of  recurrence for ER positive Her2-negative breast cancers.  It is clear from previous studies that patients who have a low risk Oncotype do not benefit from adjuvant chemotherapy.  Conversely, patients with a high risk Oncotype can have a significant benefit from adjuvant chemotherapy.  Scores in the intermediate risk group may have a small benefit based on the patient's age.  We discussed that the Oncotype test is most useful if chemotherapy is an option that the patient is considering.      Regardless of the decision about chemotherapy, she is a candidate for adjuvant endocrine therapy with an aromatase inhibitor.  We reviewed the potential side effects of anastrozole including hot flashes, vaginal dryness, joint pain, decrease in bone density, and mood or sleep disturbance.     She mentions that she has a trip to [place] planned in early February.  We discussed that if she does receive chemotherapy, this potentially could be ending shortly before her departure.     F/u 3 weeks to review oncotype result.    -10/28/2021 left nipple excision without neoplasm    -11/18/2021 [facility], [place], operated by [facility] medical oncology follow-up visit: Has her tissue expanders and beyond the discomfort there is doing well.  Alkaline phosphatase slightly elevated on baseline testing.  Talked about doing bone scanning but for now we will simply repeat the CMP prior to return to my nurse practitioner in 2 months for survivorship visit.  In the meantime we reviewed her Oncotype score which has a low risk Oncotype and no chemotherapy is recommended.  I would give her at least 5 years and preferably 7 of Arimidex.  Because she has had bilateral mastectomies, she needs no radiation.  For PTSD symptoms, we will refer her to [facility] and she will need OB/GYN evaluation which she is going to arrange given her history of ovarian abnormalities as outlined above.  She is going to see pulmonary medicine because of her sarcoidosis.  I think the abnormality of the  pleura seen on CT is likely related to her sarcoid and not likely breast metastasis but will get pulmonary opinion.    -2022 oncology survivorship visit completed    -2022 [facility] Oncology clinic follow-up: Sina is doing well, she is tolerating Arimidex with no significant side effects.  We will plan on 5-7 years of adjuvant therapy, she began Arimidex 2021.  She will need periodic bone density testing and has not had that yet, I went ahead and ordered it today to get baseline, she states that they have been normal in the past.  She has had her expanders replaced with implants, she plans on further revision in October.  She has no evidence of disease on clinical exam and no new worrisome symptoms.  She continues to follow with [facility] Acevedo for counseling and her anxiety, it is under better control with medication changes, she is now on escitalopram and only takes Xanax rarely.  She is up-to-date on routine gynecological exam.  She did asked today about having her ovaries removed since she has had breast cancer, she thought that her gynecologist mentioned that this is done often times when patients have breast cancer.  I discussed with her that there is no recommendation or indication for routine oophorectomy in the absence of genetic predisposition, we do not just recommend removal of the ovaries when someone has had breast cancer.  She has no family history that would warrant genetic testing.  She does have a history of apparently atypical ovarian cyst that was found at the time of her daughter's birth which was a  22 years ago and was followed closely for quite some time but apparently everything returned negative and she resumed routine follow-up.  She saw pulmonologist recently, Dr. Davila in regards to her history of sarcoidosis and right lower lobe nodule.  She was pleased to find out that the right lower lobe nodule is indicative of benign granuloma and there were no other  worrisome findings on CT scan, she will follow with pulmonology annually, no plans for further scanning in the absence of new symptoms.    -7/28/2022 DEXA scan shows left femoral neck osteopenia    -11/3/2023 breast revision     Malignant neoplasm of upper-outer quadrant of left breast in female, estrogen receptor positive (HCC)   10/14/2021 Initial Diagnosis    Malignant neoplasm of upper-outer quadrant of left female breast (HCC)     10/27/2021 Cancer Staged    Staging form: Breast, AJCC 8th Edition  - Pathologic: Stage IA (pT1c, pN0, cM0, G3, ER+, KY+, HER2-) - Signed by Krysten Anguiano MD on 10/27/2021     11/3/2021 -  Other Event    Oncotype score 16 with 4% distant recurrence risk at 9 years on hormone blockade alone with less than 1% adjuvant benefit from chemotherapy.     11/18/2021 -  Hormonal Therapy    Arimidex for a planned 5-7 years         HISTORY OF PRESENT ILLNESS:  The patient is a 62 y.o. female, here for follow up on management of history of breast cancer.  No new somatic complaints    Past Medical History:   Diagnosis Date   • 6 mm noncalcified right lower lobe nodule 11/30/2021   • Anxiety    • Arthritis    • COVID-19 12/29/2021   • Fracture of foot bone, left, closed 2021   • Hx of skin cancer, basal cell    • Hypertension    • Malignant neoplasm of upper-outer quadrant of left female breast (HCC) 10/14/2021   • Sarcoidosis     10 years ago   • Wears reading eyeglasses      Past Surgical History:   Procedure Laterality Date   • BREAST ABSCESS INCISION AND DRAINAGE Left 10/28/2021    Procedure: DEBRIDEMENT LEFT BREAST NIPPLE;  Surgeon: Mic Hannah MD;  Location: [facility] OR;  Service: Plastics;  Laterality: Left;   • BREAST RECONSTRUCTION, BREAST TISSUE EXPANDER INSERTION Bilateral 10/14/2021    Procedure: IMMEDIATE BILATERAL BREAST RECONSTRUCTION WITH TISSUE EXPANDERS AND ALLODERM;  Surgeon: Mic Hannah MD;  Location:  [facility] OR;  Service: Plastics;  Laterality: Bilateral;   • BREAST  "RECONSTRUCTION, BREAST TISSUE EXPANDER REMOVAL, IMPLANT INSERTION Bilateral 2022    Procedure: BREAST RECONSTRUCTION, BREAST TISSUE EXPANDER EXCHANGE TO PERMANENT IMPLANTS BILATERAL;  Surgeon: Mic Hannah MD;  Location:  [facility] OR;  Service: Plastics;  Laterality: Bilateral;   • BREAST SURGERY Left 10/28/2021    Procedure: COMPLEX CLOSURE LEFT;  Surgeon: Mic Hannah MD;  Location:  [facility] OR;  Service: Plastics;  Laterality: Left;   • CARPAL TUNNEL RELEASE Bilateral    •  SECTION      with ovaraian cyst removed and found \"borderline cells\" in the cyst followed and released 6 years ago   • COLONOSCOPY     • FAT GRAFTING Bilateral 11/3/2022    Procedure: BREAST REVISION WITH FAT GRAFTING BILATERAL;  Surgeon: Mic Hannah MD;  Location:  [facility] OR;  Service: Plastics;  Laterality: Bilateral;   • FOOT SURGERY Left     hardware   • KNEE CARTILAGE SURGERY Left    • LYMPH NODE BIOPSY         • MASTECTOMY W/ SENTINEL NODE BIOPSY Bilateral 10/14/2021    Procedure: SKIN SPARING MASTECTOMY, LEFT SENITNEL NODE BIOPSY, RIGHT PROPHYLACTIC SKIN SPARING MASTECTOMY;  Surgeon: Sylvester Mc MD;  Location:  [facility] OR;  Service: General;  Laterality: Bilateral;   • NIPPLE RECONSTRUCTION Left 11/3/2022    Procedure: NIPPLE RECONSTRUCTION- LEFT;  Surgeon: Mic Hannah MD;  Location:  [facility] OR;  Service: Plastics;  Laterality: Left;   • ORIF FOOT FRACTURE Left    • TOTAL KNEE ARTHROPLASTY Left        No Known Allergies    Family History and Social History reviewed and changed as necessary    REVIEW OF SYSTEM:   No new somatic complaints    PHYSICAL EXAM:  Lungs clear    Vitals:    23 0953   Height: 163.8 cm (64.5\")     There were no vitals filed for this visit.       ECOG score: 0           Vitals reviewed.    Lab Results   Component Value Date    HGB 13.0 10/27/2022    HCT 39.5 10/27/2022    MCV 94.7 10/27/2022     10/27/2022    WBC 6.96 10/27/2022       Lab Results   Component " Value Date    GLUCOSE 106 (H) 10/27/2022    BUN 18 10/27/2022    CREATININE 0.89 10/27/2022     10/27/2022    K 4.6 10/27/2022     10/27/2022    CO2 28.0 10/27/2022    CALCIUM 9.4 10/27/2022    PROTEINTOT 7.7 11/18/2021    ALBUMIN 4.60 11/18/2021    BILITOT 0.2 11/18/2021    ALKPHOS 126 (H) 11/18/2021    AST 24 11/18/2021    ALT 27 11/18/2021            ASSESSMENT & PLAN:  1. [id number] ER+ KY+ Her2 negative invasive ductal carcinoma of the left breast with low risk Oncotype score 16.  Began adjuvant Arimidex 11/2021  A) bilateral mastectomy on 10/14/21.  Pathology showed a 1.5 cm high grade IDC.  0/2 SLN involved.  2. Hypertension  3. Sarcoidosis diagnosed on mediastinoscopy never required treatment.  4. Anxiety  5. History of atypical cyst found in ovarian cyst shortly after birth of her daughter 21 years ago followed with serial exams.      Oncology history timeline:  -10/1/2021 CT chestShow 6 mm pleural-based nodule right lung base for which continued follow-up in 6 months is recommended.  Dr. Schmid states that the enlargement of the lymph nodes within the hilar regions and mediastinum can easily be related to in part of her known sarcoidosis.  PET canceled  -10/14/2021  Right breast prophylactic skin sparing mastectomy benign.  Left breast skin sparing nipple sparing mastectomy invasive ductal 1.5 cm carcinoma Epi grade 3 wide margins.  0 out of 2 left sentinel nodes involved.  [place] 8 out of 9.  No lymph vascular invasion.  ER 95% 3+  KY 1-5% 3+  HER-2/will 5% 1+  Oncotype score 16.  4% distant recurrence risk at 9 years on hormone blockade alone.  Less than 1% adjuvant chemotherapeutic benefit.    -10/27/2021 consultation Dr. Krysten Anguiano: She discussedthe use of the Oncotype recurrence score.  This test provides information about the biology and risk of recurrence for ER positive Her2-negative breast cancers.  It is clear from previous studies that patients who have a low risk  Oncotype do not benefit from adjuvant chemotherapy.  Conversely, patients with a high risk Oncotype can have a significant benefit from adjuvant chemotherapy.  Scores in the intermediate risk group may have a small benefit based on the patient's age.  We discussed that the Oncotype test is most useful if chemotherapy is an option that the patient is considering.      Regardless of the decision about chemotherapy, she is a candidate for adjuvant endocrine therapy with an aromatase inhibitor.  We reviewed the potential side effects of anastrozole including hot flashes, vaginal dryness, joint pain, decrease in bone density, and mood or sleep disturbance.     She mentions that she has a trip to [place] planned in early February.  We discussed that if she does receive chemotherapy, this potentially could be ending shortly before her departure.     F/u 3 weeks to review oncotype result.    -10/28/2021 left nipple excision without neoplasm    -11/18/2021 [facility] medical oncology follow-up visit: Has her tissue expanders and beyond the discomfort there is doing well.  Alkaline phosphatase slightly elevated on baseline testing.  Talked about doing bone scanning but for now we will simply repeat the CMP prior to return to my nurse practitioner in 2 months for survivorship visit.  In the meantime we reviewed her Oncotype score which has a low risk Oncotype and no chemotherapy is recommended.  I would give her at least 5 years and preferably 7 of Arimidex.  Because she has had bilateral mastectomies, she needs no radiation.  For PTSD symptoms, we will refer her to [facility] and she will need OB/GYN evaluation which she is going to arrange given her history of ovarian abnormalities as outlined above.  She is going to see pulmonary medicine because of her sarcoidosis.  I think the abnormality of the pleura seen on CT is likely related to her sarcoid and not likely breast metastasis but will get pulmonary opinion.    -1/11/2022  oncology survivorship visit completed    -2022 [facility] Oncology clinic follow-up: Sina is doing well, she is tolerating Arimidex with no significant side effects.  We will plan on 5-7 years of adjuvant therapy, she began Arimidex 2021.  She will need periodic bone density testing and has not had that yet, I went ahead and ordered it today to get baseline, she states that they have been normal in the past.  She has had her expanders replaced with implants, she plans on further revision in October.  She has no evidence of disease on clinical exam and no new worrisome symptoms.  She continues to follow with [facility] Acevedo for counseling and her anxiety, it is under better control with medication changes, she is now on escitalopram and only takes Xanax rarely.  She is up-to-date on routine gynecological exam.  She did asked today about having her ovaries removed since she has had breast cancer, she thought that her gynecologist mentioned that this is done often times when patients have breast cancer.  I discussed with her that there is no recommendation or indication for routine oophorectomy in the absence of genetic predisposition, we do not just recommend removal of the ovaries when someone has had breast cancer.  She has no family history that would warrant genetic testing.  She does have a history of apparently atypical ovarian cyst that was found at the time of her daughter's birth which was a  22 years ago and was followed closely for quite some time but apparently everything returned negative and she resumed routine follow-up.  She saw pulmonologist recently, Dr. Davila in regards to her history of sarcoidosis and right lower lobe nodule.  She was pleased to find out that the right lower lobe nodule is indicative of benign granuloma and there were no other worrisome findings on CT scan, she will follow with pulmonology annually, no plans for further scanning in the absence of new  symptoms.    -7/28/2022 DEXA scan shows left femoral neck osteopenia    -11/3/2023 breast revision    -1/26/2023 [facility] medical oncology follow-up visit: No somatic complaints to suggest recurrence.  Asked her to get with primary care for osteopenia therapy of their choosing and would need repeat DEXA scan in a couple of years.  We will have her see my nurse practitioner back for chest wall exam in 6 months.  Continue Arimidex until at least November 2026 and preferably through November 2028 at least.  If tolerating could go as far as November 2031.  In part depends on her bone densities.    Total time of care today inclusive of time spent today prior to patient's arrival reviewing interval images and reports thereof and  Interval notes from my nurse practitioner and during visit interviewing her as to signs or symptoms of recurrence of which she has none and after visit arranging follow-up with my nurse practitioner in 6 months took 30 minutes of patient care time.  01/26/2023

## 2023-01-26 NOTE — PROGRESS NOTES
CHIEF COMPLAINT: No new somatic complaints    Problem List:  Oncology/Hematology History Overview Note   1. [id number] ER+ VA+ Her2 negative invasive ductal carcinoma of the left breast with low risk Oncotype score 16.  Began adjuvant Arimidex 11/2021  A) bilateral mastectomy on 10/14/21.  Pathology showed a 1.5 cm high grade IDC.  0/2 SLN involved.  2. Hypertension  3. Sarcoidosis diagnosed on mediastinoscopy never required treatment.  4. Anxiety  5. History of atypical cyst found in ovarian cyst shortly after birth of her daughter 21 years ago followed with serial exams.      Oncology history timeline:  -10/1/2021 CT chestShow 6 mm pleural-based nodule right lung base for which continued follow-up in 6 months is recommended.  Dr. Schmid states that the enlargement of the lymph nodes within the hilar regions and mediastinum can easily be related to in part of her known sarcoidosis.  PET canceled  -10/14/2021  Right breast prophylactic skin sparing mastectomy benign.  Left breast skin sparing nipple sparing mastectomy invasive ductal 1.5 cm carcinoma [place] grade 3 wide margins.  0 out of 2 left sentinel nodes involved.  [place] 8 out of 9.  No lymph vascular invasion.  ER 95% 3+  VA 1-5% 3+  HER-2/will 5% 1+  Oncotype score 16.  4% distant recurrence risk at 9 years on hormone blockade alone.  Less than 1% adjuvant chemotherapeutic benefit.    -10/27/2021 consultation Dr. Krysten Anguiano: She discussedthe use of the Oncotype recurrence score.  This test provides information about the biology and risk of recurrence for ER positive Her2-negative breast cancers.  It is clear from previous studies that patients who have a low risk Oncotype do not benefit from adjuvant chemotherapy.  Conversely, patients with a high risk Oncotype can have a significant benefit from adjuvant chemotherapy.  Scores in the intermediate risk group may have a small benefit based on the patient's age.  We discussed that the Oncotype test  is most useful if chemotherapy is an option that the patient is considering.      Regardless of the decision about chemotherapy, she is a candidate for adjuvant endocrine therapy with an aromatase inhibitor.  We reviewed the potential side effects of anastrozole including hot flashes, vaginal dryness, joint pain, decrease in bone density, and mood or sleep disturbance.     She mentions that she has a trip to [place] planned in early February.  We discussed that if she does receive chemotherapy, this potentially could be ending shortly before her departure.     F/u 3 weeks to review oncotype result.    -10/28/2021 left nipple excision without neoplasm    -11/18/2021 [facility] medical oncology follow-up visit: Has her tissue expanders and beyond the discomfort there is doing well.  Alkaline phosphatase slightly elevated on baseline testing.  Talked about doing bone scanning but for now we will simply repeat the CMP prior to return to my nurse practitioner in 2 months for survivorship visit.  In the meantime we reviewed her Oncotype score which has a low risk Oncotype and no chemotherapy is recommended.  I would give her at least 5 years and preferably 7 of Arimidex.  Because she has had bilateral mastectomies, she needs no radiation.  For PTSD symptoms, we will refer her to [facility] and she will need OB/GYN evaluation which she is going to arrange given her history of ovarian abnormalities as outlined above.  She is going to see pulmonary medicine because of her sarcoidosis.  I think the abnormality of the pleura seen on CT is likely related to her sarcoid and not likely breast metastasis but will get pulmonary opinion.    -1/11/2022 oncology survivorship visit completed    -7/26/2022 [facility] Oncology clinic follow-up: Sina is doing well, she is tolerating Arimidex with no significant side effects.  We will plan on 5-7 years of adjuvant therapy, she began Arimidex November 2021.  She will need periodic bone density  testing and has not had that yet, I went ahead and ordered it today to get baseline, she states that they have been normal in the past.  She has had her expanders replaced with implants, she plans on further revision in October.  She has no evidence of disease on clinical exam and no new worrisome symptoms.  She continues to follow with [facility] Acevedo for counseling and her anxiety, it is under better control with medication changes, she is now on escitalopram and only takes Xanax rarely.  She is up-to-date on routine gynecological exam.  She did asked today about having her ovaries removed since she has had breast cancer, she thought that her gynecologist mentioned that this is done often times when patients have breast cancer.  I discussed with her that there is no recommendation or indication for routine oophorectomy in the absence of genetic predisposition, we do not just recommend removal of the ovaries when someone has had breast cancer.  She has no family history that would warrant genetic testing.  She does have a history of apparently atypical ovarian cyst that was found at the time of her daughter's birth which was a  22 years ago and was followed closely for quite some time but apparently everything returned negative and she resumed routine follow-up.  She saw pulmonologist recently, Dr. Davila in regards to her history of sarcoidosis and right lower lobe nodule.  She was pleased to find out that the right lower lobe nodule is indicative of benign granuloma and there were no other worrisome findings on CT scan, she will follow with pulmonology annually, no plans for further scanning in the absence of new symptoms.    -2022 DEXA scan shows left femoral neck osteopenia    -11/3/2023 breast revision     Malignant neoplasm of upper-outer quadrant of left breast in female, estrogen receptor positive (HCC)   10/14/2021 Initial Diagnosis    Malignant neoplasm of upper-outer quadrant of left  female breast (HCC)     10/27/2021 Cancer Staged    Staging form: Breast, AJCC 8th Edition  - Pathologic: Stage IA (pT1c, pN0, cM0, G3, ER+, DC+, HER2-) - Signed by Krysten Anguiano MD on 10/27/2021     11/3/2021 -  Other Event    Oncotype score 16 with 4% distant recurrence risk at 9 years on hormone blockade alone with less than 1% adjuvant benefit from chemotherapy.     11/18/2021 -  Hormonal Therapy    Arimidex for a planned 5-7 years         HISTORY OF PRESENT ILLNESS:  The patient is a 62 y.o. female, here for follow up on management of history of breast cancer.  No new somatic complaints    Past Medical History:   Diagnosis Date   • 6 mm noncalcified right lower lobe nodule 11/30/2021   • Anxiety    • Arthritis    • COVID-19 12/29/2021   • Fracture of foot bone, left, closed 2021   • Hx of skin cancer, basal cell    • Hypertension    • Malignant neoplasm of upper-outer quadrant of left female breast (HCC) 10/14/2021   • Sarcoidosis     10 years ago   • Wears reading eyeglasses      Past Surgical History:   Procedure Laterality Date   • BREAST ABSCESS INCISION AND DRAINAGE Left 10/28/2021    Procedure: DEBRIDEMENT LEFT BREAST NIPPLE;  Surgeon: Mic Hannah MD;  Location: [facility] OR;  Service: Plastics;  Laterality: Left;   • BREAST RECONSTRUCTION, BREAST TISSUE EXPANDER INSERTION Bilateral 10/14/2021    Procedure: IMMEDIATE BILATERAL BREAST RECONSTRUCTION WITH TISSUE EXPANDERS AND ALLODERM;  Surgeon: Mic Hannah MD;  Location: [facility] OR;  Service: Plastics;  Laterality: Bilateral;   • BREAST RECONSTRUCTION, BREAST TISSUE EXPANDER REMOVAL, IMPLANT INSERTION Bilateral 02/24/2022    Procedure: BREAST RECONSTRUCTION, BREAST TISSUE EXPANDER EXCHANGE TO PERMANENT IMPLANTS BILATERAL;  Surgeon: Mic Hannah MD;  Location:  [facility] OR;  Service: Plastics;  Laterality: Bilateral;   • BREAST SURGERY Left 10/28/2021    Procedure: COMPLEX CLOSURE LEFT;  Surgeon: Mic Hannah MD;  Location: [facility] OR;   "Service: Plastics;  Laterality: Left;   • CARPAL TUNNEL RELEASE Bilateral    •  SECTION      with ovaraian cyst removed and found \"borderline cells\" in the cyst followed and released 6 years ago   • COLONOSCOPY     • FAT GRAFTING Bilateral 11/3/2022    Procedure: BREAST REVISION WITH FAT GRAFTING BILATERAL;  Surgeon: Mic Hannah MD;  Location:  [facility] OR;  Service: Plastics;  Laterality: Bilateral;   • FOOT SURGERY Left     hardware   • KNEE CARTILAGE SURGERY Left    • LYMPH NODE BIOPSY      2010   • MASTECTOMY W/ SENTINEL NODE BIOPSY Bilateral 10/14/2021    Procedure: SKIN SPARING MASTECTOMY, LEFT SENITNEL NODE BIOPSY, RIGHT PROPHYLACTIC SKIN SPARING MASTECTOMY;  Surgeon: Sylvester Mc MD;  Location:  [facility] OR;  Service: General;  Laterality: Bilateral;   • NIPPLE RECONSTRUCTION Left 11/3/2022    Procedure: NIPPLE RECONSTRUCTION- LEFT;  Surgeon: Mic Hannah MD;  Location:  [facility] OR;  Service: Plastics;  Laterality: Left;   • ORIF FOOT FRACTURE Left    • TOTAL KNEE ARTHROPLASTY Left        No Known Allergies    Family History and Social History reviewed and changed as necessary    REVIEW OF SYSTEM:   No new somatic complaints    PHYSICAL EXAM:  Lungs clear    Vitals:    23 0953   Height: 163.8 cm (64.5\")     There were no vitals filed for this visit.       ECOG score: 0           Vitals reviewed.    Lab Results   Component Value Date    HGB 13.0 10/27/2022    HCT 39.5 10/27/2022    MCV 94.7 10/27/2022     10/27/2022    WBC 6.96 10/27/2022       Lab Results   Component Value Date    GLUCOSE 106 (H) 10/27/2022    BUN 18 10/27/2022    CREATININE 0.89 10/27/2022     10/27/2022    K 4.6 10/27/2022     10/27/2022    CO2 28.0 10/27/2022    CALCIUM 9.4 10/27/2022    PROTEINTOT 7.7 2021    ALBUMIN 4.60 2021    BILITOT 0.2 2021    ALKPHOS 126 (H) 2021    AST 24 2021    ALT 27 2021             ASSESSMENT & PLAN:  1. [id number] ER+ IL+ " Her2 negative invasive ductal carcinoma of the left breast with low risk Oncotype score 16.  Began adjuvant Arimidex 11/2021  A) bilateral mastectomy on 10/14/21.  Pathology showed a 1.5 cm high grade IDC.  0/2 SLN involved.  2. Hypertension  3. Sarcoidosis diagnosed on mediastinoscopy never required treatment.  4. Anxiety  5. History of atypical cyst found in ovarian cyst shortly after birth of her daughter 21 years ago followed with serial exams.      Oncology history timeline:  -10/1/2021 CT chestShow 6 mm pleural-based nodule right lung base for which continued follow-up in 6 months is recommended.  Dr. Schmid states that the enlargement of the lymph nodes within the hilar regions and mediastinum can easily be related to in part of her known sarcoidosis.  PET canceled  -10/14/2021  Right breast prophylactic skin sparing mastectomy benign.  Left breast skin sparing nipple sparing mastectomy invasive ductal 1.5 cm carcinoma [place] grade 3 wide margins.  0 out of 2 left sentinel nodes involved.  [place] 8 out of 9.  No lymph vascular invasion.  ER 95% 3+  MT 1-5% 3+  HER-2/will 5% 1+  Oncotype score 16.  4% distant recurrence risk at 9 years on hormone blockade alone.  Less than 1% adjuvant chemotherapeutic benefit.    -10/27/2021 consultation Dr. Krysten Anguiano: She discussedthe use of the Oncotype recurrence score.  This test provides information about the biology and risk of recurrence for ER positive Her2-negative breast cancers.  It is clear from previous studies that patients who have a low risk Oncotype do not benefit from adjuvant chemotherapy.  Conversely, patients with a high risk Oncotype can have a significant benefit from adjuvant chemotherapy.  Scores in the intermediate risk group may have a small benefit based on the patient's age.  We discussed that the Oncotype test is most useful if chemotherapy is an option that the patient is considering.      Regardless of the decision about  chemotherapy, she is a candidate for adjuvant endocrine therapy with an aromatase inhibitor.  We reviewed the potential side effects of anastrozole including hot flashes, vaginal dryness, joint pain, decrease in bone density, and mood or sleep disturbance.     She mentions that she has a trip to [place] planned in early February.  We discussed that if she does receive chemotherapy, this potentially could be ending shortly before her departure.     F/u 3 weeks to review oncotype result.    -10/28/2021 left nipple excision without neoplasm    -11/18/2021 [facility] medical oncology follow-up visit: Has her tissue expanders and beyond the discomfort there is doing well.  Alkaline phosphatase slightly elevated on baseline testing.  Talked about doing bone scanning but for now we will simply repeat the CMP prior to return to my nurse practitioner in 2 months for survivorship visit.  In the meantime we reviewed her Oncotype score which has a low risk Oncotype and no chemotherapy is recommended.  I would give her at least 5 years and preferably 7 of Arimidex.  Because she has had bilateral mastectomies, she needs no radiation.  For PTSD symptoms, we will refer her to [facility] and she will need OB/GYN evaluation which she is going to arrange given her history of ovarian abnormalities as outlined above.  She is going to see pulmonary medicine because of her sarcoidosis.  I think the abnormality of the pleura seen on CT is likely related to her sarcoid and not likely breast metastasis but will get pulmonary opinion.    -1/11/2022 oncology survivorship visit completed    -7/26/2022 [facility] Oncology clinic follow-up: Sina is doing well, she is tolerating Arimidex with no significant side effects.  We will plan on 5-7 years of adjuvant therapy, she began Arimidex November 2021.  She will need periodic bone density testing and has not had that yet, I went ahead and ordered it today to get baseline, she states that they have been  normal in the past.  She has had her expanders replaced with implants, she plans on further revision in October.  She has no evidence of disease on clinical exam and no new worrisome symptoms.  She continues to follow with [facility]Y Acevedo for counseling and her anxiety, it is under better control with medication changes, she is now on escitalopram and only takes Xanax rarely.  She is up-to-date on routine gynecological exam.  She did asked today about having her ovaries removed since she has had breast cancer, she thought that her gynecologist mentioned that this is done often times when patients have breast cancer.  I discussed with her that there is no recommendation or indication for routine oophorectomy in the absence of genetic predisposition, we do not just recommend removal of the ovaries when someone has had breast cancer.  She has no family history that would warrant genetic testing.  She does have a history of apparently atypical ovarian cyst that was found at the time of her daughter's birth which was a  22 years ago and was followed closely for quite some time but apparently everything returned negative and she resumed routine follow-up.  She saw pulmonologist recently, Dr. Davila in regards to her history of sarcoidosis and right lower lobe nodule.  She was pleased to find out that the right lower lobe nodule is indicative of benign granuloma and there were no other worrisome findings on CT scan, she will follow with pulmonology annually, no plans for further scanning in the absence of new symptoms.    -2022 DEXA scan shows left femoral neck osteopenia    -11/3/2023 breast revision    -2023 [facility] medical oncology follow-up visit: No somatic complaints to suggest recurrence.  Asked her to get with primary care for osteopenia therapy of their choosing and would need repeat DEXA scan in a couple of years.  We will have her see my nurse practitioner back for chest wall exam in 6  months.  Continue Arimidex until at least November 2026 and preferably through November 2028 at least.  If tolerating could go as far as November 2031.  In part depends on her bone densities.    Total time of care today inclusive of time spent today prior to patient's arrival reviewing interval images and reports thereof and  Interval notes from my nurse practitioner and during visit interviewing her as to signs or symptoms of recurrence of which she has none and after visit arranging follow-up with my nurse practitioner in 6 months took 30 minutes of patient care time.  01/26/2023

## 2023-05-10 ENCOUNTER — TRANSCRIBE ORDERS (OUTPATIENT)
Dept: ADMINISTRATIVE | Facility: HOSPITAL | Age: 63
End: 2023-05-10
Payer: COMMERCIAL

## 2023-05-10 ENCOUNTER — HOSPITAL ENCOUNTER (OUTPATIENT)
Dept: GENERAL RADIOLOGY | Facility: HOSPITAL | Age: 63
Discharge: HOME | End: 2023-05-10
Admitting: INTERNAL MEDICINE
Payer: COMMERCIAL

## 2023-05-10 DIAGNOSIS — M25.561 RIGHT KNEE PAIN, UNSPECIFIED CHRONICITY: Primary | ICD-10-CM

## 2023-05-10 PROCEDURE — 73562 X-RAY EXAM OF KNEE 3: CPT

## 2023-07-28 DIAGNOSIS — Z17.0 MALIGNANT NEOPLASM OF UPPER-OUTER QUADRANT OF LEFT BREAST IN FEMALE, ESTROGEN RECEPTOR POSITIVE: ICD-10-CM

## 2023-07-28 DIAGNOSIS — C50.412 MALIGNANT NEOPLASM OF UPPER-OUTER QUADRANT OF LEFT BREAST IN FEMALE, ESTROGEN RECEPTOR POSITIVE: ICD-10-CM

## 2023-07-31 RX ORDER — ANASTROZOLE 1 MG/1
TABLET ORAL
Refills: 3 | OUTPATIENT
Start: 2023-07-31

## 2023-08-07 ENCOUNTER — OFFICE VISIT (OUTPATIENT)
Dept: PSYCHIATRY | Facility: CLINIC | Age: 63
End: 2023-08-07
Payer: COMMERCIAL

## 2023-08-07 ENCOUNTER — OFFICE VISIT (OUTPATIENT)
Dept: ONCOLOGY | Facility: CLINIC | Age: 63
End: 2023-08-07
Payer: COMMERCIAL

## 2023-08-07 VITALS
DIASTOLIC BLOOD PRESSURE: 89 MMHG | OXYGEN SATURATION: 97 % | TEMPERATURE: 98.4 F | BODY MASS INDEX: 35.16 KG/M2 | HEART RATE: 63 BPM | RESPIRATION RATE: 18 BRPM | SYSTOLIC BLOOD PRESSURE: 177 MMHG | WEIGHT: 211 LBS | HEIGHT: 65 IN

## 2023-08-07 DIAGNOSIS — F41.1 GAD (GENERALIZED ANXIETY DISORDER): Primary | ICD-10-CM

## 2023-08-07 DIAGNOSIS — C50.412 MALIGNANT NEOPLASM OF UPPER-OUTER QUADRANT OF LEFT BREAST IN FEMALE, ESTROGEN RECEPTOR POSITIVE: Primary | ICD-10-CM

## 2023-08-07 DIAGNOSIS — Z17.0 MALIGNANT NEOPLASM OF UPPER-OUTER QUADRANT OF LEFT BREAST IN FEMALE, ESTROGEN RECEPTOR POSITIVE: Primary | ICD-10-CM

## 2023-08-07 PROCEDURE — 90837 PSYTX W PT 60 MINUTES: CPT | Performed by: NURSE PRACTITIONER

## 2023-08-07 PROCEDURE — 99213 OFFICE O/P EST LOW 20 MIN: CPT | Performed by: NURSE PRACTITIONER

## 2023-08-07 NOTE — PROGRESS NOTES
CHIEF COMPLAINT:  Right knee pain    Problem List:  Oncology/Hematology History Overview Note   1. [id number] ER+ MO+ Her2 negative invasive ductal carcinoma of the left breast with low risk Oncotype score 16.  Began adjuvant Arimidex 11/2021  A) bilateral mastectomy on 10/14/21.  Pathology showed a 1.5 cm high grade IDC.  0/2 SLN involved.  -Begin Arimidex November 2021 for a planned 5-7 years  2. Hypertension  3. Sarcoidosis diagnosed on mediastinoscopy never required treatment.  4. Anxiety  5. History of atypical cyst found in ovarian cyst shortly after birth of her daughter in 1999 followed with serial exams with no recurrence, now back to routine gynecology exams.    Oncology history timeline:  -10/1/2021 CT chestShow 6 mm pleural-based nodule right lung base for which continued follow-up in 6 months is recommended.  Dr. Schmid states that the enlargement of the lymph nodes within the hilar regions and mediastinum can easily be related to in part of her known sarcoidosis.  PET canceled  -10/14/2021  Right breast prophylactic skin sparing mastectomy benign.  Left breast skin sparing nipple sparing mastectomy invasive ductal 1.5 cm carcinoma [place] grade 3 wide margins.  0 out of 2 left sentinel nodes involved.  [place] 8 out of 9.  No lymph vascular invasion.  ER 95% 3+  MO 1-5% 3+  HER-2/will 5% 1+  Oncotype score 16.  4% distant recurrence risk at 9 years on hormone blockade alone.  Less than 1% adjuvant chemotherapeutic benefit.    -10/27/2021 consultation Dr. Krysten Anguiano: She discussedthe use of the Oncotype recurrence score.  This test provides information about the biology and risk of recurrence for ER positive Her2-negative breast cancers.  It is clear from previous studies that patients who have a low risk Oncotype do not benefit from adjuvant chemotherapy.  Conversely, patients with a high risk Oncotype can have a significant benefit from adjuvant chemotherapy.  Scores in the intermediate risk  group may have a small benefit based on the patient's age.  We discussed that the Oncotype test is most useful if chemotherapy is an option that the patient is considering.      Regardless of the decision about chemotherapy, she is a candidate for adjuvant endocrine therapy with an aromatase inhibitor.  We reviewed the potential side effects of anastrozole including hot flashes, vaginal dryness, joint pain, decrease in bone density, and mood or sleep disturbance.     She mentions that she has a trip to [place] planned in early February.  We discussed that if she does receive chemotherapy, this potentially could be ending shortly before her departure.     F/u 3 weeks to review oncotype result.    -10/28/2021 left nipple excision without neoplasm    -11/18/2021 [facility] medical oncology follow-up visit: Has her tissue expanders and beyond the discomfort there is doing well.  Alkaline phosphatase slightly elevated on baseline testing.  Talked about doing bone scanning but for now we will simply repeat the CMP prior to return to my nurse practitioner in 2 months for survivorship visit.  In the meantime we reviewed her Oncotype score which has a low risk Oncotype and no chemotherapy is recommended.  I would give her at least 5 years and preferably 7 of Arimidex.  Because she has had bilateral mastectomies, she needs no radiation.  For PTSD symptoms, we will refer her to [facility] and she will need OB/GYN evaluation which she is going to arrange given her history of ovarian abnormalities as outlined above.  She is going to see pulmonary medicine because of her sarcoidosis.  I think the abnormality of the pleura seen on CT is likely related to her sarcoid and not likely breast metastasis but will get pulmonary opinion.    -1/11/2022 oncology survivorship visit completed    -7/26/2022 [facility] Oncology clinic follow-up: Sina is doing well, she is tolerating Arimidex with no significant side effects.  We will plan on 5-7  years of adjuvant therapy, she began Arimidex 2021.  She will need periodic bone density testing and has not had that yet, I went ahead and ordered it today to get baseline, she states that they have been normal in the past.  She has had her expanders replaced with implants, she plans on further revision in October.  She has no evidence of disease on clinical exam and no new worrisome symptoms.  She continues to follow with ROBY Acevedo for counseling and her anxiety, it is under better control with medication changes, she is now on escitalopram and only takes Xanax rarely.  She is up-to-date on routine gynecological exam.  She did asked today about having her ovaries removed since she has had breast cancer, she thought that her gynecologist mentioned that this is done often times when patients have breast cancer.  I discussed with her that there is no recommendation or indication for routine oophorectomy in the absence of genetic predisposition, we do not just recommend removal of the ovaries when someone has had breast cancer.  She has no family history that would warrant genetic testing.  She does have a history of apparently atypical ovarian cyst that was found at the time of her daughter's birth which was a  22 years ago and was followed closely for quite some time but apparently everything returned negative and she resumed routine follow-up.  She saw pulmonologist recently, Dr. Davila in regards to her history of sarcoidosis and right lower lobe nodule.  She was pleased to find out that the right lower lobe nodule is indicative of benign granuloma and there were no other worrisome findings on CT scan, she will follow with pulmonology annually, no plans for further scanning in the absence of new symptoms.    -2022 DEXA scan shows left femoral neck osteopenia    -11/3/2023 breast revision    -2023 [facility] medical oncology follow-up visit: No somatic complaints to suggest  recurrence.  Asked her to get with primary care for osteopenia therapy of their choosing and would need repeat DEXA scan in a couple of years.  We will have her see my nurse practitioner back for chest wall exam in 6 months.  Continue Arimidex until at least November 2026 and preferably through November 2028 at least.  If tolerating could go as far as November 2031.  In part depends on her bone densities.     Malignant neoplasm of upper-outer quadrant of left breast in female, estrogen receptor positive   10/14/2021 Initial Diagnosis    Malignant neoplasm of upper-outer quadrant of left female breast (HCC)     10/27/2021 Cancer Staged    Staging form: Breast, AJCC 8th Edition  - Pathologic: Stage IA (pT1c, pN0, cM0, G3, ER+, SC+, HER2-) - Signed by Krysten Anguiano MD on 10/27/2021     11/3/2021 -  Other Event    Oncotype score 16 with 4% distant recurrence risk at 9 years on hormone blockade alone with less than 1% adjuvant benefit from chemotherapy.     11/18/2021 -  Hormonal Therapy    Arimidex for a planned 5-7 years         HISTORY OF PRESENT ILLNESS:  The patient is a 62 y.o. female, here for follow up on management of history of ER positive left breast cancer currently on adjuvant therapy with Arimidex.  Sina overall has been doing well since we saw her last.  She is tolerating Arimidex with no unusual side effects.  She has chronic right knee pain but otherwise no new bone pain.  No new or concerning findings on chest exam.    Past Medical History:   Diagnosis Date    6 mm noncalcified right lower lobe nodule 11/30/2021    Anxiety     Arthritis     COVID-19 12/29/2021    Fracture of foot bone, left, closed 2021    Hx of skin cancer, basal cell     Hypertension     Malignant neoplasm of upper-outer quadrant of left female breast 10/14/2021    Sarcoidosis     10 years ago    Wears reading eyeglasses      Past Surgical History:   Procedure Laterality Date    BREAST ABSCESS INCISION AND DRAINAGE Left 10/28/2021  "   Procedure: DEBRIDEMENT LEFT BREAST NIPPLE;  Surgeon: Mic Hannah MD;  Location: [facility];  Service: Plastics;  Laterality: Left;    BREAST RECONSTRUCTION, BREAST TISSUE EXPANDER INSERTION Bilateral 10/14/2021    Procedure: IMMEDIATE BILATERAL BREAST RECONSTRUCTION WITH TISSUE EXPANDERS AND ALLODERM;  Surgeon: Mic Hannah MD;  Location: [facility];  Service: Plastics;  Laterality: Bilateral;    BREAST RECONSTRUCTION, BREAST TISSUE EXPANDER REMOVAL, IMPLANT INSERTION Bilateral 2022    Procedure: BREAST RECONSTRUCTION, BREAST TISSUE EXPANDER EXCHANGE TO PERMANENT IMPLANTS BILATERAL;  Surgeon: Mic Hnanah MD;  Location: [facility];  Service: Plastics;  Laterality: Bilateral;    BREAST SURGERY Left 10/28/2021    Procedure: COMPLEX CLOSURE LEFT;  Surgeon: Mic Hannah MD;  Location: [facility];  Service: Plastics;  Laterality: Left;    CARPAL TUNNEL RELEASE Bilateral      SECTION      with ovaraian cyst removed and found \"borderline cells\" in the cyst followed and released 6 years ago    COLONOSCOPY      FAT GRAFTING Bilateral 11/3/2022    Procedure: BREAST REVISION WITH FAT GRAFTING BILATERAL;  Surgeon: Mic Hannah MD;  Location: [facility];  Service: Plastics;  Laterality: Bilateral;    FOOT SURGERY Left     hardware    KNEE CARTILAGE SURGERY Left     LYMPH NODE BIOPSY      2010    MASTECTOMY W/ SENTINEL NODE BIOPSY Bilateral 10/14/2021    Procedure: SKIN SPARING MASTECTOMY, LEFT SENITNEL NODE BIOPSY, RIGHT PROPHYLACTIC SKIN SPARING MASTECTOMY;  Surgeon: Sylvester Mc MD;  Location: [facility];  Service: General;  Laterality: Bilateral;    NIPPLE RECONSTRUCTION Left 11/3/2022    Procedure: NIPPLE RECONSTRUCTION- LEFT;  Surgeon: Mic Hannah MD;  Location: [facility];  Service: Plastics;  Laterality: Left;    ORIF FOOT FRACTURE Left     TOTAL KNEE ARTHROPLASTY Left        No Known Allergies    Family History and Social History reviewed and changed as " "necessary    REVIEW OF SYSTEM:   Chronic right knee pain otherwise no new somatic complaints    PHYSICAL EXAM:  Well-developed, well-nourished healthy-appearing female in no distress  No cervical, supraclavicular or axillary nodes palpable on exam  Skin tissue of her bilateral reconstructed breast is soft, no abnormal masses, nodules, rashes or lesions    Vitals:    08/07/23 1055   BP: 177/89   Pulse: 63   Resp: 18   Temp: 98.4 øF (36.9 øC)   SpO2: 97%   Weight: 95.7 kg (211 lb)   Height: 163.8 cm (64.5\")     Vitals:    08/07/23 1055   PainSc: 0-No pain          ECOG score: 0           Vitals reviewed.    Lab Results   Component Value Date    HGB 13.0 10/27/2022    HCT 39.5 10/27/2022    MCV 94.7 10/27/2022     10/27/2022    WBC 6.96 10/27/2022       Lab Results   Component Value Date    GLUCOSE 106 (H) 10/27/2022    BUN 18 10/27/2022    CREATININE 0.89 10/27/2022     10/27/2022    K 4.6 10/27/2022     10/27/2022    CO2 28.0 10/27/2022    CALCIUM 9.4 10/27/2022    PROTEINTOT 7.7 11/18/2021    ALBUMIN 4.60 11/18/2021    BILITOT 0.2 11/18/2021    ALKPHOS 126 (H) 11/18/2021    AST 24 11/18/2021    ALT 27 11/18/2021             ASSESSMENT & PLAN:    1. [id number] ER+ NE+ Her2 negative invasive ductal carcinoma of the left breast with low risk Oncotype score 16.  Began adjuvant Arimidex 11/2021  A) bilateral mastectomy on 10/14/21.  Pathology showed a 1.5 cm high grade IDC.  0/2 SLN involved.  -Begin Arimidex November 2021 for a planned 5-7 years  2. Hypertension  3. Sarcoidosis diagnosed on mediastinoscopy never required treatment.  4. Anxiety  5. History of atypical cyst found in ovarian cyst shortly after birth of her daughter in 1999 followed with serial exams with no recurrence, now back to routine gynecology exams.    Oncology history timeline:  -10/1/2021 CT chestShow 6 mm pleural-based nodule right lung base for which continued follow-up in 6 months is recommended.  Dr. Schmid states that the " enlargement of the lymph nodes within the hilar regions and mediastinum can easily be related to in part of her known sarcoidosis.  PET canceled  -10/14/2021  Right breast prophylactic skin sparing mastectomy benign.  Left breast skin sparing nipple sparing mastectomy invasive ductal 1.5 cm carcinoma [place] grade 3 wide margins.  0 out of 2 left sentinel nodes involved.  [place] 8 out of 9.  No lymph vascular invasion.  ER 95% 3+  [place] 1-5% 3+  HER-2/will 5% 1+  Oncotype score 16.  4% distant recurrence risk at 9 years on hormone blockade alone.  Less than 1% adjuvant chemotherapeutic benefit.    -10/27/2021 consultation Dr. Krysten Anguiano: She discussedthe use of the Oncotype recurrence score.  This test provides information about the biology and risk of recurrence for ER positive Her2-negative breast cancers.  It is clear from previous studies that patients who have a low risk Oncotype do not benefit from adjuvant chemotherapy.  Conversely, patients with a high risk Oncotype can have a significant benefit from adjuvant chemotherapy.  Scores in the intermediate risk group may have a small benefit based on the patient's age.  We discussed that the Oncotype test is most useful if chemotherapy is an option that the patient is considering.      Regardless of the decision about chemotherapy, she is a candidate for adjuvant endocrine therapy with an aromatase inhibitor.  We reviewed the potential side effects of anastrozole including hot flashes, vaginal dryness, joint pain, decrease in bone density, and mood or sleep disturbance.     She mentions that she has a trip to [place] planned in early February.  We discussed that if she does receive chemotherapy, this potentially could be ending shortly before her departure.     F/u 3 weeks to review oncotype result.    -10/28/2021 left nipple excision without neoplasm    -11/18/2021 [facility] medical oncology follow-up visit: Has her tissue expanders and beyond the  discomfort there is doing well.  Alkaline phosphatase slightly elevated on baseline testing.  Talked about doing bone scanning but for now we will simply repeat the CMP prior to return to my nurse practitioner in 2 months for survivorship visit.  In the meantime we reviewed her Oncotype score which has a low risk Oncotype and no chemotherapy is recommended.  I would give her at least 5 years and preferably 7 of Arimidex.  Because she has had bilateral mastectomies, she needs no radiation.  For PTSD symptoms, we will refer her to [facility] and she will need OB/GYN evaluation which she is going to arrange given her history of ovarian abnormalities as outlined above.  She is going to see pulmonary medicine because of her sarcoidosis.  I think the abnormality of the pleura seen on CT is likely related to her sarcoid and not likely breast metastasis but will get pulmonary opinion.    -1/11/2022 oncology survivorship visit completed    -7/26/2022 [facility] Oncology clinic follow-up: Sina is doing well, she is tolerating Arimidex with no significant side effects.  We will plan on 5-7 years of adjuvant therapy, she began Arimidex November 2021.  She will need periodic bone density testing and has not had that yet, I went ahead and ordered it today to get baseline, she states that they have been normal in the past.  She has had her expanders replaced with implants, she plans on further revision in October.  She has no evidence of disease on clinical exam and no new worrisome symptoms.  She continues to follow with ROBY Acevedo for counseling and her anxiety, it is under better control with medication changes, she is now on escitalopram and only takes Xanax rarely.  She is up-to-date on routine gynecological exam.  She did asked today about having her ovaries removed since she has had breast cancer, she thought that her gynecologist mentioned that this is done often times when patients have breast cancer.  I discussed  with her that there is no recommendation or indication for routine oophorectomy in the absence of genetic predisposition, we do not just recommend removal of the ovaries when someone has had breast cancer.  She has no family history that would warrant genetic testing.  She does have a history of apparently atypical ovarian cyst that was found at the time of her daughter's birth which was a  22 years ago and was followed closely for quite some time but apparently everything returned negative and she resumed routine follow-up.  She saw pulmonologist recently, Dr. Davila in regards to her history of sarcoidosis and right lower lobe nodule.  She was pleased to find out that the right lower lobe nodule is indicative of benign granuloma and there were no other worrisome findings on CT scan, she will follow with pulmonology annually, no plans for further scanning in the absence of new symptoms.    -2022 DEXA scan shows left femoral neck osteopenia    -11/3/2023 breast revision    -2023 [facility] medical oncology follow-up visit: No somatic complaints to suggest recurrence.  Asked her to get with primary care for osteopenia therapy of their choosing and would need repeat DEXA scan in a couple of years.  We will have her see my nurse practitioner back for chest wall exam in 6 months.  Continue Arimidex until at least 2026 and preferably through 2028 at least.  If tolerating could go as far as 2031.  In part depends on her bone densities.    -2023 [facility] Oncology clinic follow-up: Sina has no evidence of recurrent breast cancer on clinical exam and no new worrisome symptoms.  She is doing well on Arimidex, we plan on 5-7 years adjuvant therapy, she began Arimidex 2021.  When she is approaching 5 years would give consideration to BCI testing.  She is up-to-date on bone density scan, this will be due again around 2024.  She does take calcium and vitamin D.  She  walks when she can but her right knee pain limits her ability to walk any distance.  She follows with orthopedics.      Return to clinic in 6 months for follow-up    This was a level 3, limited [facility] visit with stable chronic illness and prescription drug management.    Cherrie Claudio, APRN  08/07/2023

## 2023-08-07 NOTE — PROGRESS NOTES
"THERAPY PROGRESS NOTE  08/07/23    Subjective   Sina Greenfield is a  62 y.o. female who met with the undersigned for a scheduled individual outpatient therapy session in person. She is s/p bilateral mastectomies and reconstruction for breast cancer. She is on an anastrozole for at least 9 years. Dr. Morris is her medical oncologist. Dr. Hannah her plastic surgeon.        Chief Complaint: ALEXIS    Therapy:  Start Time: 9:50 am   Stop Time: 1050    ( 60) minutes was spent for psychotherapy. Assisted patient in processing patient's ALEXIS. Acknowledged and normalized patient's thoughts, feelings, and concerns. Utilized cognitive behavioral therapy to assist the patient in recognizing more appropriate coping mechanisms when she becomes agitated/anxious which are proven effective in reducing the severity of frequency of symptoms.     CLINICAL MANEUVERING/INTERVENTION:   Patient talked about current stressors, primarily having a difficult time dealing with losing her health insurance through her  and having more financial strain, as well has needing a right knee replacement . Venting of frustrations was conducted. Feelings were processed and validated, both negative and positive. Flushing out worries and concerns was conducted in order to diminish emotional tension. Processing knee pain and effects on mobility. Ways in which patient may take stress off  herself in a purposeful manner was discussed. 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 She states it has added social engagement and activity, and a little \"made money\" as pluses.  Patient was assisted in 'talking out' health challenge, keeping in mind the notion that there is typically a solution to any given problem. Utilized motivational interviewing techniques including complex reflections to attempt to assist the patient and focusing on the positive and to maintain and encourage calming outlook. She also commented on " "having 3 D tattooing for nipples recently and \"I'm so surprised on body image wise that helped me\".  The patient expressed gratitude for today's session and said that counseling helps feel better.        Appearance: appropriate  Hygiene:   good  Cooperation:  Cooperative  Eye Contact:  Good  Psychomotor Behavior:  No psychomotor agitation/retardation, No EPS, No motor tics  Mood:  within normal limits  Affect:  Appropriate  Hopelessness: Denies  Speech:  Normal  Thought Process:  Linear  Thought Content:  Normal  Concentration: Normal   Suicidal:  None  Homicidal:  None  Hallucinations:  None  Delusion:  None  Memory:  Intact  Orientation:  Person, Place, Time and Situation  Reliability:  good  Insight:  good  Judgement: good  Impulse Control: good  Estimated Intelligence: average range    ALEXIS-7:    Over the last two weeks, how often have you been bothered by the following problems?  Feeling nervous, anxious or on edge: Several days  Not being able to stop or control worrying: Not at all  Worrying too much about different things: Not at all  Trouble Relaxing: Not at all  Being so restless that it is hard to sit still: Not at all  Becoming easily annoyed or irritable: Not at all  Feeling afraid as if something awful might happen: Not at all  ALEXIS 7 Total Score: 1  If you checked any problems, how difficult have these problems made it for you to do your work, take care of things at home, or get along with other people: Not difficult at all  0-4: Minimal anxiety  5-9: Mild anxiety  10-14: Moderate anxiety  15-21: Severe anxiety  PHQ-9:      8/7/2023    11:00 AM   PHQ-2/PHQ-9 Depression Screening   Little Interest or Pleasure in Doing Things 0-->not at all   Feeling Down, Depressed or Hopeless 0-->not at all   PHQ-9: Brief Depression Severity Measure Score 0      5-9: Minimal symptoms  10-14: Mild depression  15-19: Moderate depression  Greater than 20: Major depression sever      ASSESSMENT: ALEXIS managed well "     PATIENTS SUPPORT NETWORK INCLUDES:  FUNCTIONAL STATUS: NO IMPAIRMENT  PROGNOSIS: GOOD WITH ONGOING TREATMENT    PLAN:    Patient will continue therapy and  adhere to medication regimen as prescribed. Provide Cognitive Behavioral Therapy and Solution Focused Therapy to improve functioning, maintain stability, and avoid decompensation and the need for higher level of care. Instructed to call for questions or concerns and return early if necessary.      Return in about 6 months (around 2/7/2024).

## 2023-08-28 ENCOUNTER — TELEPHONE (OUTPATIENT)
Dept: ONCOLOGY | Facility: CLINIC | Age: 63
End: 2023-08-28
Payer: COMMERCIAL

## 2023-08-28 NOTE — TELEPHONE ENCOUNTER
Patient left a voicemail she is on a trip over seas and forgot to pack the Anastrozole which means she is going to be not taking it for 3 weeks is this okay or should she have someone go to her house and overnight it to her? Please call.

## 2023-11-02 DIAGNOSIS — Z17.0 MALIGNANT NEOPLASM OF UPPER-OUTER QUADRANT OF LEFT BREAST IN FEMALE, ESTROGEN RECEPTOR POSITIVE: ICD-10-CM

## 2023-11-02 DIAGNOSIS — C50.412 MALIGNANT NEOPLASM OF UPPER-OUTER QUADRANT OF LEFT BREAST IN FEMALE, ESTROGEN RECEPTOR POSITIVE: ICD-10-CM

## 2023-11-02 RX ORDER — ANASTROZOLE 1 MG/1
TABLET ORAL
Qty: 90 TABLET | Refills: 3 | Status: SHIPPED | OUTPATIENT
Start: 2023-11-02

## 2023-11-08 ENCOUNTER — HOSPITAL ENCOUNTER (OUTPATIENT)
Dept: GENERAL RADIOLOGY | Facility: HOSPITAL | Age: 63
Discharge: HOME | End: 2023-11-08
Admitting: INTERNAL MEDICINE
Payer: COMMERCIAL

## 2023-11-08 ENCOUNTER — TRANSCRIBE ORDERS (OUTPATIENT)
Dept: ADMINISTRATIVE | Facility: HOSPITAL | Age: 63
End: 2023-11-08
Payer: COMMERCIAL

## 2023-11-08 DIAGNOSIS — M25.552 LEFT HIP PAIN: Primary | ICD-10-CM

## 2023-11-08 PROCEDURE — 73502 X-RAY EXAM HIP UNI 2-3 VIEWS: CPT

## 2023-11-21 ENCOUNTER — TREATMENT (OUTPATIENT)
Dept: PHYSICAL THERAPY | Facility: CLINIC | Age: 63
End: 2023-11-21
Payer: COMMERCIAL

## 2023-11-21 DIAGNOSIS — M25.552 LEFT HIP PAIN: Primary | ICD-10-CM

## 2023-11-21 NOTE — PROGRESS NOTES
Physical Therapy Initial Evaluation and Plan of Care    [address] [facility], [address]  [place] [address]    Patient: Sina Greenfield   : 1960  Diagnosis/ICD-10 Code:  Left hip pain [M25.552]  Referring practitioner: Jael Jeffrey MD  Date of Initial Visit: 2023  Today's Date: 2023  Patient seen for 1 sessions           Subjective Questionnaire: LEFS:       Subjective Evaluation    History of Present Illness  Mechanism of injury: L hip pain. Patient first noticed L thigh and groin pain a few months ago when in PT for R knee OA/pain. [place] after that and she started using her SPC to support her L LE instead of her R LE. Patient is concerned that her L LE will not be strong enough to support her for her R TKA.    CLOF: SPC in R UE, walking limited to about 30 min, step to pattern on stairs    Xray revealed moderate L hip OA. Patient scheduled to have R TKA on . History of L TKA in 2019, breast cancer 1-2 years ago       Patient Occupation: works at [employer] part time, 2x/week Pain  Current pain ratin  At best pain ratin  At worst pain ratin  Alleviating factors: pilates/stretching, sitting.  Exacerbated by: car transfers, prolonged standing/sitting, stairs.  Progression: worsening    Social Support  Lives in: multiple-level home  Lives with: spouse    Patient Goals  Patient goal: improve ability to stand/walk for prolonged periods to work with better tolerance         Objective          Palpation   Left   Hypertonic in the iliopsoas.   Tenderness of the iliopsoas.     Right   Hypertonic in the iliopsoas. Tenderness of the iliopsoas.     Active Range of Motion   Left Knee   Flexion: 126 degrees   Extension: WFL    Right Knee   Flexion: 132 degrees   Extension: WFL    Passive Range of Motion   Left Hip   Flexion: 110 degrees with pain  Extension: 0 degrees   External rotation (90/90): WFL  Internal rotation (90/90): Left hip passive  internal rotation 90/90: painful, mod limitation.     Right Hip   Flexion: WFL  External rotation (90/90): WFL  Internal rotation (90/90): WFL    Strength/Myotome Testing     Left Hip   Planes of Motion   Flexion: 3+ (pain)  Extension: 4-  Abduction: 4-  External rotation: 4    Right Hip   Planes of Motion   Flexion: 4+  Extension: 4  Abduction: 4  External rotation: 4 (pain)    Left Knee   Flexion: 5  Extension: 5  Quadriceps contraction: good    Right Knee   Flexion: 4+  Extension: 5  Quadriceps contraction: good    Tests     Left Hip   Positive FADIR.   Negative YANA and scour.     Right Hip   Negative YANA, FADIR and scour.     Ambulation     Comments   SPC in R UE    Functional Assessment     Single Leg Stance   Left: 5 seconds  Right: 10 seconds          Assessment & Plan       Assessment  Impairments: abnormal gait, abnormal muscle firing, abnormal or restricted ROM, activity intolerance, impaired balance, impaired physical strength, lacks appropriate home exercise program, pain with function and weight-bearing intolerance   Functional limitations: lifting, sleeping, walking, uncomfortable because of pain, moving in bed, sitting and standing   Assessment details: Patient is a 62 yo female who presents with L anterior hip/groin pain consistent with anterior hip joint impingement/arthropathy and possible hip flexor tendinopathy. Resisted/AROM/PROM L hip flexion were painful and replicated her anterior hip symptoms. Limited B hip extension and lateral/posterior hip strength noted.  Barriers to therapy: orthopedic and onc comorbidities, psychosocial factors  Prognosis: good    Goals  Plan Goals: Short Term Goals (4 weeks)  1. Patient to be compliant with initial HEP  2. Patient to report walking >60 minutes without increased pain.  3. Patient to improve LEFS to <36/80.    Long Term Goals (8 weeks)  1. Patient to report standing/walking >2 hrs without increased pain to allow normal work function.  2. Patient to  improve LEFS to <46/80.  3. Patient to demonstrate independence with home exercises.  4. Patient to demonstrate gross hip strength >4+/5.    Plan  Therapy options: will be seen for skilled therapy services  Planned modality interventions: dry needling, cryotherapy, TENS and thermotherapy (hydrocollator packs)  Planned therapy interventions: abdominal trunk stabilization, balance/weight-bearing training, manual therapy, motor coordination training, neuromuscular re-education, postural training, soft tissue mobilization, spinal/joint mobilization, strengthening, stretching, therapeutic activities, joint mobilization, IADL retraining, home exercise program, gait training, functional ROM exercises and flexibility  Frequency: 2x week  Duration in weeks: 8  Treatment plan discussed with: patient        Timed:  Manual Therapy:    10     mins  66416;  Therapeutic Exercise:    10     mins  09201;     Neuromuscular Mary:    0    mins  82962;    Therapeutic Activity:     0     mins  48642;     Gait Trainin     mins  85606;     Ultrasound:     0     mins  64099;    Electrical Stimulation:    0     mins  12098 ( );    Untimed:  Electrical Stimulation:    0     mins  17560 ( );  Mechanical Traction:    0     mins  01083;     Timed Treatment:   20   mins   Total Treatment:     50   mins    PT SIGNATURE: Be Gross PT   License Number: [id number]  DATE TREATMENT INITIATED: 2023    Initial Certification  Certification Period: 2024  I certify that the therapy services are furnished while this patient is under my care.  The services outlined above are required by this patient, and will be reviewed every 90 days.     PHYSICIAN: Jael Jeffrey MD      DATE:     Please sign and return via fax to [phone].. Thank you, [facility]iatric [facility].

## 2023-11-27 ENCOUNTER — TREATMENT (OUTPATIENT)
Dept: PHYSICAL THERAPY | Facility: CLINIC | Age: 63
End: 2023-11-27
Payer: COMMERCIAL

## 2023-11-27 DIAGNOSIS — M25.552 LEFT HIP PAIN: Primary | ICD-10-CM

## 2023-11-27 PROCEDURE — 97110 THERAPEUTIC EXERCISES: CPT | Performed by: PHYSICAL THERAPIST

## 2023-11-27 PROCEDURE — 97112 NEUROMUSCULAR REEDUCATION: CPT | Performed by: PHYSICAL THERAPIST

## 2023-11-27 PROCEDURE — 97140 MANUAL THERAPY 1/> REGIONS: CPT | Performed by: PHYSICAL THERAPIST

## 2023-11-27 NOTE — PROGRESS NOTES
Physical Therapy Daily Progress Note    [address] [facility], [address]  [place] [address]      Patient: Sina Greenfield   : 1960  Diagnosis/ICD-10 Code:  Left hip pain [M25.552]  Referring practitioner: Jael Jeffrey MD  Date of Initial Visit: Type: THERAPY  Noted: 2023  Today's Date: 2023  Patient seen for 2 sessions           Patient reports: her L hip felt very good for 2 days after last visit and overall still feels better than it did prior to last visit, but she was still limited to the same degree by L hip pain when working on Friday and standing for prolonged periods. She reports being sick at the same time, however, and she is unsure whether this affected her hip or not. R knee is the most painful part of her legs today.      Objective   See Exercise, Manual, and Modality Logs for complete treatment.       Assessment/Plan  Continued L hip joint mobilization with less pain reported after. Introduced posterior and lateral hip strengthening with exercises patient had previously completed during rehab for her knees, which she tolerated well.            Timed:  Manual Therapy:    12     mins  79007;  Therapeutic Exercise:    18     mins  53316;     Neuromuscular Mary:   10    mins  98748;    Therapeutic Activity:     0     mins  58338;     Gait Trainin     mins  86845;     Ultrasound:     0     mins  00806;    Electrical Stimulation:    0     mins  19201 ( );    Untimed:  Electrical Stimulation:    0     mins  84634 ( );  Mechanical Traction:    0     mins  69493;     Timed Treatment:   40   mins   Total Treatment:     40   mins    Be Gross PT  Physical Therapist

## 2023-11-30 ENCOUNTER — TREATMENT (OUTPATIENT)
Dept: PHYSICAL THERAPY | Facility: CLINIC | Age: 63
End: 2023-11-30
Payer: COMMERCIAL

## 2023-11-30 DIAGNOSIS — M25.552 LEFT HIP PAIN: Primary | ICD-10-CM

## 2023-11-30 NOTE — PROGRESS NOTES
Physical Therapy Daily Progress Note    [address] [facility], [address]  [place] [address]      Patient: Sina Greenfield   : 1960  Diagnosis/ICD-10 Code:  Left hip pain [M25.552]  Referring practitioner: Jael Jeffrey MD  Date of Initial Visit: Type: THERAPY  Noted: 2023  Today's Date: 2023  Patient seen for 3 sessions           Patient reports: groin pain has calmed down but she was limited by L thigh pain and fatigued after prolonged shopping yesterday. She expresses concern for how prognosis with R TKA planned for January.      Objective   See Exercise, Manual, and Modality Logs for complete treatment.       Assessment/Plan  With patient expressing willingness/tolerance to lying prone, trialed anterior hip mobilization and self IR stretching with good tolerance and positive result, less pain during ambulation. She expressed confidence in her ability to complete a self IR stretch in prone at home, which should help mobilize her anterior hip capsule.            Timed:  Manual Therapy:    24     mins  08481;  Therapeutic Exercise:    16     mins  15945;     Neuromuscular aMry:   0    mins  23141;    Therapeutic Activity:     0     mins  97587;     Gait Trainin     mins  96019;     Ultrasound:     0     mins  01442;    Electrical Stimulation:    0     mins  03121 ( );    Untimed:  Electrical Stimulation:    0     mins  42852 ( );  Mechanical Traction:    0     mins  75106;     Timed Treatment:   40   mins   Total Treatment:     40   mins    Be Gross PT  Physical Therapist

## 2023-12-04 ENCOUNTER — TREATMENT (OUTPATIENT)
Dept: PHYSICAL THERAPY | Facility: CLINIC | Age: 63
End: 2023-12-04
Payer: COMMERCIAL

## 2023-12-04 DIAGNOSIS — M25.552 LEFT HIP PAIN: Primary | ICD-10-CM

## 2023-12-04 PROCEDURE — 97530 THERAPEUTIC ACTIVITIES: CPT | Performed by: PHYSICAL THERAPIST

## 2023-12-04 PROCEDURE — 97140 MANUAL THERAPY 1/> REGIONS: CPT | Performed by: PHYSICAL THERAPIST

## 2023-12-04 PROCEDURE — 97110 THERAPEUTIC EXERCISES: CPT | Performed by: PHYSICAL THERAPIST

## 2023-12-04 PROCEDURE — 97112 NEUROMUSCULAR REEDUCATION: CPT | Performed by: PHYSICAL THERAPIST

## 2023-12-04 NOTE — PROGRESS NOTES
Physical Therapy Daily Progress Note    [address] [facility], [address]  [place] [address]      Patient: Sina Greenfield   : 1960  Diagnosis/ICD-10 Code:  Left hip pain [M25.552]  Referring practitioner: Jael Jeffrey MD  Date of Initial Visit: Type: THERAPY  Noted: 2023  Today's Date: 2023  Patient seen for 4 sessions           Patient reports: good relief with her groin and thigh pain over the past few days.       Objective   See Exercise, Manual, and Modality Logs for complete treatment.       Assessment/Plan  Patient's spouse accompanied her today to learn some of her hip mobility exercises. Also taught prone hip mobilization to her spouse as he should be able to safely administer them at home after he adequately and safely demonstrated them in the clinic.    Progressed CKC strengthening with fatigue but good tolerance.        Timed:  Manual Therapy:    15     mins  95693;  Therapeutic Exercise:    18     mins  58244;     Neuromuscular Mary:   10    mins  57316;    Therapeutic Activity:     15     mins  13641;     Gait Trainin     mins  41661;     Ultrasound:     0     mins  20518;    Electrical Stimulation:    0     mins  83625 ( );    Untimed:  Electrical Stimulation:    0     mins  55771 ( );  Mechanical Traction:    0     mins  60915;     Timed Treatment:   58   mins   Total Treatment:     58   mins    Be Gross PT  Physical Therapist

## 2023-12-06 ENCOUNTER — TREATMENT (OUTPATIENT)
Dept: PHYSICAL THERAPY | Facility: CLINIC | Age: 63
End: 2023-12-06
Payer: COMMERCIAL

## 2023-12-06 DIAGNOSIS — M25.552 LEFT HIP PAIN: Primary | ICD-10-CM

## 2023-12-06 NOTE — PROGRESS NOTES
Physical Therapy Daily Progress Note    [address] [facility], [address]  [place] [address]      Patient: Sina Greenfield   : 1960  Diagnosis/ICD-10 Code:  Left hip pain [M25.552]  Referring practitioner: Jael Jeffrey MD  Date of Initial Visit: Type: THERAPY  Noted: 2023  Today's Date: 2023  Patient seen for 5 sessions           Patient reports: tolerating a long work day better than in recent weeks, though she still had groin pain later in the day. She got stretched out by her  afterward, with good relief noted.      Objective   See Exercise, Manual, and Modality Logs for complete treatment.       Assessment/Plan  Continued hip mobilization with improved symptoms noted afterward. Progressed lateral hip strengthening. She demonstrated improved endurance with CKC strength exercises and SLR.            Timed:  Manual Therapy:    14     mins  01107;  Therapeutic Exercise:    21     mins  51715;     Neuromuscular Mary:   0    mins  38791;    Therapeutic Activity:     10     mins  31238;     Gait Trainin     mins  47717;     Ultrasound:     0     mins  88425;    Electrical Stimulation:    0     mins  56750 ( );    Untimed:  Electrical Stimulation:    0     mins  69553 ( );  Mechanical Traction:    0     mins  53462;     Timed Treatment:   45   mins   Total Treatment:     45   mins    Be Gross PT  Physical Therapist

## 2023-12-11 ENCOUNTER — TREATMENT (OUTPATIENT)
Dept: PHYSICAL THERAPY | Facility: CLINIC | Age: 63
End: 2023-12-11
Payer: COMMERCIAL

## 2023-12-11 DIAGNOSIS — M25.552 LEFT HIP PAIN: Primary | ICD-10-CM

## 2023-12-11 NOTE — PROGRESS NOTES
Physical Therapy Daily Progress Note    [address] [facility], [address]  [place] [address]      Patient: Sina Greenfield   : 1960  Diagnosis/ICD-10 Code:  Left hip pain [M25.552]  Referring practitioner: Jael Jeffrey MD  Date of Initial Visit: Type: THERAPY  Noted: 2023  Today's Date: 2023  Patient seen for 6 sessions           Patient reports: severe over fatigue working after last visit but recovered over the next few days.       Objective   See Exercise, Manual, and Modality Logs for complete treatment.       Assessment/Plan  Progressed posterior chain strengthening with good tolerance. Advised patient to continue stretching on her fatigued days. Overall less symptoms following treatment.            Timed:  Manual Therapy:    12     mins  80052;  Therapeutic Exercise:    10     mins  97844;     Neuromuscular Mary:   0    mins  48709;    Therapeutic Activity:     10     mins  36039;     Gait Trainin     mins  63742;     Ultrasound:     0     mins  52839;    Electrical Stimulation:    0     mins  12262 ( );    Untimed:  Electrical Stimulation:    0     mins  69645 ( );  Mechanical Traction:    0     mins  81818;     Timed Treatment:   32   mins   Total Treatment:     32   mins    Be Gross PT  Physical Therapist

## 2023-12-13 ENCOUNTER — TREATMENT (OUTPATIENT)
Dept: PHYSICAL THERAPY | Facility: CLINIC | Age: 63
End: 2023-12-13
Payer: COMMERCIAL

## 2023-12-13 DIAGNOSIS — M25.552 LEFT HIP PAIN: Primary | ICD-10-CM

## 2023-12-13 NOTE — PROGRESS NOTES
Physical Therapy Daily Progress Note     [address] [facility], [address]  [place] [address]      Patient: Sina Greenfield   : 1960  Diagnosis/ICD-10 Code:  Left hip pain [M25.552]  Referring practitioner: Jael Jeffrey MD  Date of Initial Visit: Type: THERAPY  Noted: 2023  Today's Date: 2023  Patient seen for 7 sessions           Patient reports: feeling sore from work yesterday but she felt better at work and after using her stationary bike at home.      Objective   See Exercise, Manual, and Modality Logs for complete treatment.       Assessment/Plan  Continued left hip joint mobilization relief noted afterwards.  Introduced prehab for right knee due to scheduled right TKA at the beginning of January.  Right knee range of motion is full, so exercises focused mainly on quadriceps, hamstring, and calf strengthening.  Patient was fatigued following treatment, which has been normal after her recent visits.  No increased pain following treatment.            Timed:  Manual Therapy:    15     mins  76954;  Therapeutic Exercise:    20     mins  77369;     Neuromuscular Mary:   15    mins  33017;    Therapeutic Activity:     8     mins  61883;     Gait Trainin     mins  45330;     Ultrasound:     0     mins  97894;    Electrical Stimulation:    0     mins  30210 ( );    Untimed:  Electrical Stimulation:    0     mins  37920 (MC );  Mechanical Traction:    0     mins  62900;     Timed Treatment:   58   mins   Total Treatment:     58   mins    Be Gross PT  Physical Therapist

## 2023-12-18 ENCOUNTER — TREATMENT (OUTPATIENT)
Dept: PHYSICAL THERAPY | Facility: CLINIC | Age: 63
End: 2023-12-18
Payer: COMMERCIAL

## 2023-12-18 DIAGNOSIS — M25.552 LEFT HIP PAIN: Primary | ICD-10-CM

## 2023-12-18 NOTE — PROGRESS NOTES
Physical Therapy Daily Progress Note    [address] [facility], [address]  [place] [address]      Patient: Sina Greenfield   : 1960  Diagnosis/ICD-10 Code:  Left hip pain [M25.552]  Referring practitioner: Jael Jeffrey MD  Date of Initial Visit: Type: THERAPY  Noted: 2023  Today's Date: 2023  Patient seen for 8 sessions           Patient reports: still being limited by L thigh pain the day after working.      Objective   See Exercise, Manual, and Modality Logs for complete treatment.       Assessment/Plan  Extensively discussed recurrent left thigh pain with patient.  She remains concerned that an unknown underlying issue is causing her thigh pain to feel like it is going to give way.  Patient was encouraged to seek additional evaluation from her PCP or Ortho providers due to her continued concern, but the degree of L quad weakness/inhibition she first presented with could still be driving her symptoms.  Trialed STM to L quad, with patient reporting reduction in symptoms afterward.  Patient encouraged to remain diligent with HEP and using her stationary bike at home to address symptoms.          Timed:  Manual Therapy:    10     mins  58368;  Therapeutic Exercise:    19     mins  21601;     Neuromuscular Mary:   0    mins  22925;    Therapeutic Activity:     15     mins  41308;     Gait Trainin     mins  54428;     Ultrasound:     0     mins  00616;    Electrical Stimulation:    0     mins  51765 ( );    Untimed:  Electrical Stimulation:    0     mins  57337 ( );  Mechanical Traction:    0     mins  84429;     Timed Treatment:   44   mins   Total Treatment:     44   mins    Be Gross PT  Physical Therapist

## 2023-12-21 ENCOUNTER — TREATMENT (OUTPATIENT)
Dept: PHYSICAL THERAPY | Facility: CLINIC | Age: 63
End: 2023-12-21
Payer: COMMERCIAL

## 2023-12-21 DIAGNOSIS — M25.552 LEFT HIP PAIN: Primary | ICD-10-CM

## 2023-12-21 NOTE — PROGRESS NOTES
Physical Therapy Daily Progress Note    [address] [facility], [address]  [place] [address]      Patient: Sina Greenfield   : 1960  Diagnosis/ICD-10 Code:  Left hip pain [M25.552]  Referring practitioner: Jael Jeffrey MD  Date of Initial Visit: Type: THERAPY  Noted: 2023  Today's Date: 2023  Patient seen for 9 sessions           Patient reports: Less left thigh pain following STM, though she reports thigh soreness following HEP.      Objective   See Exercise, Manual, and Modality Logs for complete treatment.       Assessment/Plan  Continued STM to left quad.  Progressed quad eccentrics bilaterally to strengthen her right knee and reduce left thigh pain.  Added left quad stretch, after which she reported improvement in symptoms.  She was advised to complete this at home with the assistance of her  to help address her left thigh pain.            Timed:  Manual Therapy:    10     mins  68701;  Therapeutic Exercise:    15     mins  12540;     Neuromuscular Mary:   0    mins  67961;    Therapeutic Activity:     13     mins  21446;     Gait Trainin     mins  12975;     Ultrasound:     0     mins  80814;    Electrical Stimulation:    0     mins  55009 ( );    Untimed:  Electrical Stimulation:    0     mins  55301 ( );  Mechanical Traction:    0     mins  47303;     Timed Treatment:   38   mins   Total Treatment:     38   mins    Be Gross PT  Physical Therapist

## 2024-01-03 ENCOUNTER — TREATMENT (OUTPATIENT)
Dept: PHYSICAL THERAPY | Facility: CLINIC | Age: 64
End: 2024-01-03
Payer: COMMERCIAL

## 2024-01-03 DIAGNOSIS — M25.552 LEFT HIP PAIN: Primary | ICD-10-CM

## 2024-01-03 NOTE — PROGRESS NOTES
Physical Therapy Daily Progress Note and Discharge Summary      [address] [facility] [address]  [place] [address]    Patient: Sina Greenfield   : 1960  Diagnosis/ICD-10 Code:  Left hip pain [M25.552]  Referring practitioner: Jael Jeffrey MD  Date of Initial Visit: Type: THERAPY  Noted: 2023  Today's Date: 1/3/2024  Patient seen for 10 sessions      Subjective:       Subjective Evaluation    History of Present Illness  Mechanism of injury: CLOF: rarely uses SPC but uses it after walking prolonged time, on her feet up to 6 hrs but hurts after, reciprocal pattern on stairs with UE assist    Xray revealed moderate L hip OA. Patient scheduled to have R TKA on . History of L TKA in 2019, breast cancer 1-2 years ago     Subjective comment: Patient reports working a 6-hour shift last week, with lingering pain for 2 days.  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     Strength/Myotome Testing     Left Hip   Planes of Motion   Flexion: 4+  Extension: 4+  Abduction: 4+  External rotation: 4+    Right Hip   Planes of Motion   Flexion: 5  Extension: 4+  Abduction: 4+  External rotation: 4+    Left Knee   Flexion: 5  Extension: 5  Quadriceps contraction: good    Right Knee   Flexion: 5  Extension: 5  Quadriceps contraction: good    Tests     Left Hip   Positive FADIR.     Ambulation     Comments   SPC in R UE        Goals  Short Term Goals (4 weeks)  1. Patient to be compliant with initial HEP. Met   2. Patient to report walking >60 minutes without increased pain. Not met  3. Patient to improve LEFS to <36/80. Not met     Long  Term Goals (8 weeks)  1. Patient to report standing/walking >2 hrs without increased pain to allow normal work function. Not met  2. Patient to improve LEFS to <46/80.  Not met  3. Patient to demonstrate independence with home exercises.  Met  4. Patient to demonstrate gross hip strength >4+/5.  Met    Discharge Summary  Patient reports significant improvement in left groin pain, though left thigh and right knee pain continue to limit her functionally.  In preparation for right TKA, she demonstrates very good right knee ROM and strength.  Patient and  are independent with assistive mobility exercises for left hip pain.  Right knee TKE exercises were taught and provided to patient in preparation for her TKA.  Patient will return under new POC following TKA.    Timed:  Manual Therapy:    0     mins  18719;  Therapeutic Exercise:    11     mins  07509;     Neuromuscular Mary:    0    mins  21406;    Therapeutic Activity:     25     mins  44741;     Gait Trainin     mins  62184;     Ultrasound:     0     mins  25785;    Electrical Stimulation:    0     mins  71469 ( );    Untimed:  Electrical Stimulation:    0     mins  55750 ( );  Mechanical Traction:    0     mins  54940;     Timed Treatment:   36   mins   Total Treatment:     36   mins      Be Gross PT  Physical Therapist

## 2024-01-12 ENCOUNTER — TRANSCRIBE ORDERS (OUTPATIENT)
Dept: PHYSICAL THERAPY | Facility: CLINIC | Age: 64
End: 2024-01-12
Payer: COMMERCIAL

## 2024-01-12 ENCOUNTER — TREATMENT (OUTPATIENT)
Dept: PHYSICAL THERAPY | Facility: CLINIC | Age: 64
End: 2024-01-12
Payer: COMMERCIAL

## 2024-01-12 DIAGNOSIS — Z98.890 HISTORY OF MAJOR ORTHOPEDIC SURGERY: ICD-10-CM

## 2024-01-12 DIAGNOSIS — M25.561 ACUTE PAIN OF RIGHT KNEE: Primary | ICD-10-CM

## 2024-01-12 DIAGNOSIS — Z96.651 TOTAL KNEE REPLACEMENT STATUS, RIGHT: ICD-10-CM

## 2024-01-12 DIAGNOSIS — M17.11 OSTEOARTHRITIS OF RIGHT KNEE, UNSPECIFIED OSTEOARTHRITIS TYPE: Primary | ICD-10-CM

## 2024-01-12 NOTE — PROGRESS NOTES
Physical Therapy Initial Evaluation and Plan of Care    [address]  [place] [address]    Patient: Sina Greenfield   : 1960  Diagnosis/ICD-10 Code:  Acute pain of right knee [M25.561]  Referring practitioner: Cherrie Milner NP  Date of Initial Visit: 2024  Today's Date: 2024  Patient seen for 1 sessions           Subjective Questionnaire: LEFS:       Subjective Evaluation    History of Present Illness  Date of surgery: 2024  Mechanism of injury: S/p R TKA. Patient had chronic progressive R knee OA. She stayed 2 days at the hospital due to passing out the 2nd day. She feels a little better today. They have kept ice on her knee almost constantly.     Patient complete    Pain  Current pain ratin  At best pain ratin  At worst pain ratin    Patient Goals  Patient goals for therapy: decreased pain, improved balance, increased strength, independence with ADLs/IADLs and decreased edema             Objective          Active Range of Motion     Right Knee   Extension: 0 degrees     Passive Range of Motion     Right Knee   Flexion: 75 degrees with pain    Strength/Myotome Testing     Left Knee   Quadriceps contraction: good    Right Knee   Quadriceps contraction: fair    Additional Strength Details  Unable to complete SLR on R    Ambulation     Comments   RW, decreased R LE stance, early heel off on R in terminal stance          Assessment & Plan       Assessment  Impairments: abnormal gait, abnormal muscle firing, abnormal or restricted ROM, activity intolerance, impaired balance, impaired physical strength, lacks appropriate home exercise program, pain with function, safety issue and weight-bearing intolerance   Functional limitations: carrying objects, sleeping, walking, uncomfortable because of pain, moving in bed, sitting, standing, stooping and unable to perform repetitive tasks   Assessment details: Patient is a 63-year-old female who presents status post R  TKA by Dr. Crocker at  on 1/9/2024.  She demonstrates very good right knee TKE ROM, though quad remains very weak as she is unable to complete SLR.  R knee flexion is limited, at 75 degrees PROM.  Patient and her spouse demonstrate competency in overall management of her current condition, as well as current HEP and modifications.  She reports her current knee ROM is much better 3 days s/p TKA compared to how her contralateral knee progressed.  Barriers to therapy: Acuity, comorbidities  Prognosis: good    Goals  Plan Goals: Short term goals (4 weeks)  1. Patient to be compliant with initial HEP.  2. Patient to demonstrate right knee PROM flexion > 110 degrees.  3. Patient to improve LEFS to greater than or equal to 24/80.  4.  Patient to demonstrate 10 SLRs on right without quad lag to demonstrate improving quadricep strength.    Long Term goals (12 weeks)  1. Patient to demonstrate right knee AROM flexion > 125 degrees.  2. Patient to demonstrate pain free and normal strength with MMTs.  3. Patient to ascend and descend eight 8 inch steps reciprocally with one handrail with minimal to no antalgia or difficulty.  4. Patient to demonstrate independence with home exercise program.  5. Patient to improve LEFS to greater than or equal to 40/80.       Plan  Therapy options: will be seen for skilled therapy services  Planned modality interventions: thermotherapy (hydrocollator packs), TENS, cryotherapy and dry needling  Planned therapy interventions: manual therapy, neuromuscular re-education, soft tissue mobilization, strengthening, stretching, therapeutic activities, joint mobilization, home exercise program, functional ROM exercises, balance/weight-bearing training, abdominal trunk stabilization, spinal/joint mobilization, gait training and flexibility  Frequency: 2x week  Duration in weeks: 12  Treatment plan discussed with: patient and family        Timed:  Manual Therapy:    0     mins  33625;  Therapeutic  Exercise:    10     mins  51988;     Neuromuscular Mary:    0    mins  58807;    Therapeutic Activity:     25     mins  02353;     Gait Trainin     mins  29930;     Ultrasound:     0     mins  35311;    Electrical Stimulation:    0     mins  45930 ( );    Untimed:  Electrical Stimulation:    0     mins  79471 ( );  Mechanical Traction:    0     mins  86798;     Timed Treatment:   35   mins   Total Treatment:     60   mins    PT SIGNATURE: Be Gross PT   License Number: [id number]  DATE TREATMENT INITIATED: 2024    Initial Certification  Certification Period: 2024  I certify that the therapy services are furnished while this patient is under my care.  The services outlined above are required by this patient, and will be reviewed every 90 days.     PHYSICIAN: Cherrie Milner NP      DATE:     Please sign and return via fax to [phone].. Thank you, [facility].

## 2024-01-15 ENCOUNTER — TREATMENT (OUTPATIENT)
Dept: PHYSICAL THERAPY | Facility: CLINIC | Age: 64
End: 2024-01-15
Payer: COMMERCIAL

## 2024-01-15 DIAGNOSIS — M25.561 ACUTE PAIN OF RIGHT KNEE: Primary | ICD-10-CM

## 2024-01-15 DIAGNOSIS — Z98.890 HISTORY OF MAJOR ORTHOPEDIC SURGERY: ICD-10-CM

## 2024-01-15 DIAGNOSIS — Z96.651 TOTAL KNEE REPLACEMENT STATUS, RIGHT: ICD-10-CM

## 2024-01-15 NOTE — PROGRESS NOTES
Physical Therapy Daily Progress Note    [address] [facility], [address]  [place] [address]      Patient: Sina Greenfield   : 1960  Diagnosis/ICD-10 Code:  Acute pain of right knee [M25.561]  Referring practitioner: Cherrie Milner NP  Date of Initial Visit: Type: THERAPY  Noted: 2024  Today's Date: 1/15/2024  Patient seen for 2 sessions           Patient reports: her knee is feeling better and bending more.      Objective   See Exercise, Manual, and Modality Logs for complete treatment.       Assessment/Plan  R knee PROM flexion to 97 degrees.  Continue with right knee PROM/AAROM to tolerance.  Quad strength remains very impaired, though she was able to initiate open chain quad eccentrics through partial ROM.            Timed:  Manual Therapy:    10     mins  95515;  Therapeutic Exercise:    20     mins  95707;     Neuromuscular Mary:   10    mins  52016;    Therapeutic Activity:     0     mins  22058;     Gait Trainin     mins  64505;     Ultrasound:     0     mins  16225;    Electrical Stimulation:    0     mins  88226 ( );    Untimed:  Electrical Stimulation:    0     mins  48057 ( );  Mechanical Traction:    0     mins  13839;     Timed Treatment:   40   mins   Total Treatment:     40   mins    Be Gross PT  Physical Therapist

## 2024-01-17 ENCOUNTER — TREATMENT (OUTPATIENT)
Dept: PHYSICAL THERAPY | Facility: CLINIC | Age: 64
End: 2024-01-17
Payer: COMMERCIAL

## 2024-01-17 NOTE — PROGRESS NOTES
Physical Therapy Daily Progress Note    [address] [facility], [address]  [place] [address]      Patient: Sina Greenfield   : 1960  Diagnosis/ICD-10 Code:  No primary diagnosis found.  Referring practitioner: Cherrie Milner NP  Date of Initial Visit: Type: THERAPY  Noted: 2024  Today's Date: 2024  Patient seen for 3 sessions           Patient reports: decreased pain and continued improvement in motion and R quad activity.      Objective          Active Range of Motion     Right Knee   Flexion: 107 degrees with pain  Extension: 5 degrees with pain    Passive Range of Motion     Right Knee   Flexion: 110 degrees with pain      See Exercise, Manual, and Modality Logs for complete treatment.       Assessment/Plan  Patient able to achieve slight SLR with quad lag for the first time.  She continues to demonstrate consistent improvement and right knee flexion and extension ROM.  Right TKE is now full, though quad contraction remains fair.  Patient able to progress to normal size stationary bike and complete full revolutions with good tolerance.  Introduced calf strengthening and stretching to assist with gait and reduce soreness with good tolerance.            Timed:  Manual Therapy:    0     mins  77779;  Therapeutic Exercise:    28     mins  55688;     Neuromuscular Mary:   8    mins  72316;    Therapeutic Activity:     10     mins  25111;     Gait Trainin     mins  57266;     Ultrasound:     0     mins  13987;    Electrical Stimulation:    0     mins  59772 ( );    Untimed:  Electrical Stimulation:    0     mins  59262 ( );  Mechanical Traction:    0     mins  80782;     Timed Treatment:   46   mins   Total Treatment:     46   mins    Be Gross PT  Physical Therapist

## 2024-01-22 ENCOUNTER — TREATMENT (OUTPATIENT)
Dept: PHYSICAL THERAPY | Facility: CLINIC | Age: 64
End: 2024-01-22
Payer: COMMERCIAL

## 2024-01-22 DIAGNOSIS — Z98.890 HISTORY OF MAJOR ORTHOPEDIC SURGERY: ICD-10-CM

## 2024-01-22 DIAGNOSIS — M25.561 ACUTE PAIN OF RIGHT KNEE: Primary | ICD-10-CM

## 2024-01-22 NOTE — PROGRESS NOTES
Physical Therapy Daily Progress Note    [address] [facility], [address]  [place] [address]      Patient: Sina Greenfield   : 1960  Diagnosis/ICD-10 Code:  Acute pain of right knee [M25.561]  Referring practitioner: Cherrie Milner NP  Date of Initial Visit: Type: THERAPY  Noted: 2024  Today's Date: 2024  Patient seen for 4 sessions           Patient reports: Walking better and having less pain overall, though she slept poorly last night due to of her right knee.      Objective          Active Range of Motion     Right Knee   Flexion: 110 degrees with pain    Passive Range of Motion     Right Knee   Flexion: 113 degrees with pain      See Exercise, Manual, and Modality Logs for complete treatment.       Assessment/Plan  Patient continues to demonstrate gradual improvement in right knee ROM.  Held right knee PROM due to noted improvements and patient's complaint of increased pain/soreness in the past day.  She demonstrated SLR, with mild quad lag present, for the first time since her TKA.  Her gait continues to improve, with stable gait demonstrated using SPC in her left hand.  She was released to use the SPC at home, while continuing use of RW for community ambulation at this time.            Timed:  Manual Therapy:    0     mins  81670;  Therapeutic Exercise:    15     mins  01187;     Neuromuscular Mary:   8    mins  94842;    Therapeutic Activity:     15     mins  94962;     Gait Trainin     mins  39951;     Ultrasound:     0     mins  39076;    Electrical Stimulation:    0     mins  24290 ( );    Untimed:  Electrical Stimulation:    0     mins  47217 ( );  Mechanical Traction:    0     mins  33321;     Timed Treatment:   38   mins   Total Treatment:     38   mins    Be Gross PT  Physical Therapist

## 2024-01-26 ENCOUNTER — TREATMENT (OUTPATIENT)
Dept: PHYSICAL THERAPY | Facility: CLINIC | Age: 64
End: 2024-01-26
Payer: COMMERCIAL

## 2024-01-26 DIAGNOSIS — Z98.890 HISTORY OF MAJOR ORTHOPEDIC SURGERY: ICD-10-CM

## 2024-01-26 DIAGNOSIS — M25.561 ACUTE PAIN OF RIGHT KNEE: Primary | ICD-10-CM

## 2024-01-26 NOTE — PROGRESS NOTES
Physical Therapy Daily Progress Note    [address] [facility], [address]  [place] [address]      Patient: Sina Greenfield   : 1960  Diagnosis/ICD-10 Code:  Acute pain of right knee [M25.561]  Referring practitioner: Cherrie Milner NP  Date of Initial Visit: Type: THERAPY  Noted: 2024  Today's Date: 2024  Patient seen for 5 sessions           Patient reports: walking well with the SPC.       Objective   See Exercise, Manual, and Modality Logs for complete treatment.       Assessment/Plan  Progress CKC lower remedy strengthening with fatigue but good tolerance.  Right knee PROM flexion up to 116 degrees, AROM 113 degrees.  Introduced gait training without assistive device, during which she reported feelings of mild instability but overall her gait pattern was smooth.  Patient fatigued with higher level activities with increase in right knee pain.            Timed:  Manual Therapy:    0     mins  98065;  Therapeutic Exercise:    14     mins  07960;     Neuromuscular Mary:   10    mins  84336;    Therapeutic Activity:     23     mins  77713;     Gait Trainin     mins  89689;     Ultrasound:     0     mins  47121;    Electrical Stimulation:    0     mins  87626 ( );    Untimed:  Electrical Stimulation:    0     mins  11300 ( );  Mechanical Traction:    0     mins  84769;     Timed Treatment:   47   mins   Total Treatment:     47   mins    Be Gross PT  Physical Therapist

## 2024-01-29 ENCOUNTER — TREATMENT (OUTPATIENT)
Dept: PHYSICAL THERAPY | Facility: CLINIC | Age: 64
End: 2024-01-29
Payer: COMMERCIAL

## 2024-01-29 DIAGNOSIS — Z96.651 TOTAL KNEE REPLACEMENT STATUS, RIGHT: ICD-10-CM

## 2024-01-29 DIAGNOSIS — M25.561 ACUTE PAIN OF RIGHT KNEE: Primary | ICD-10-CM

## 2024-01-29 DIAGNOSIS — Z98.890 HISTORY OF MAJOR ORTHOPEDIC SURGERY: ICD-10-CM

## 2024-01-29 NOTE — PROGRESS NOTES
Physical Therapy Daily Progress Note    [address] [facility], [address]  [place] [address]      Patient: Sina Greenfield   : 1960  Diagnosis/ICD-10 Code:  Acute pain of right knee [M25.561]  Referring practitioner: Cherrie Milner NP  Date of Initial Visit: Type: THERAPY  Noted: 2024  Today's Date: 2024  Patient seen for 6 sessions           Patient reports: Her knee feels stiff since she just woke, but overall she is feeling little pain in her knee is still doing very well.      Objective   See Exercise, Manual, and Modality Logs for complete treatment.       Assessment/Plan  Pretreatment right knee AROM Flex: 122 degrees  Post treatment right knee AROM Flex: 122 deg and PROM 125 deg.    Patient demonstrating very good right knee AROM at this point.  Her scar is scabbed but has yet to fully heal.  Continue dynamic balance training while introducing static balance.  Patient reported significant fatigue following treatment but no increased pain.          Timed:  Manual Therapy:    0     mins  30493;  Therapeutic Exercise:    15     mins  02712;     Neuromuscular Mary:   10    mins  73547;    Therapeutic Activity:     15     mins  41328;     Gait Trainin     mins  54340;     Ultrasound:     0     mins  00436;    Electrical Stimulation:    0     mins  42704 ( );    Untimed:  Electrical Stimulation:    0     mins  98398 ( );  Mechanical Traction:    0     mins  56718;     Timed Treatment:   40   mins   Total Treatment:     40   mins    Be Gross PT  Physical Therapist

## 2024-02-02 ENCOUNTER — TREATMENT (OUTPATIENT)
Dept: PHYSICAL THERAPY | Facility: CLINIC | Age: 64
End: 2024-02-02
Payer: COMMERCIAL

## 2024-02-02 DIAGNOSIS — Z96.651 TOTAL KNEE REPLACEMENT STATUS, RIGHT: ICD-10-CM

## 2024-02-02 DIAGNOSIS — M25.561 ACUTE PAIN OF RIGHT KNEE: Primary | ICD-10-CM

## 2024-02-02 DIAGNOSIS — Z98.890 HISTORY OF MAJOR ORTHOPEDIC SURGERY: ICD-10-CM

## 2024-02-02 NOTE — PROGRESS NOTES
Physical Therapy Daily Progress Note     [address] [facility], [address]  [place] [address]      Patient: Sina Greenfield   : 1960  Diagnosis/ICD-10 Code:  Acute pain of right knee [M25.561]  Referring practitioner: Cherrie Milner NP  Date of Initial Visit: Type: THERAPY  Noted: 2024  Today's Date: 2024  Patient seen for 7 sessions           Patient reports: most of HEP now feels easy.       Objective   See Exercise, Manual, and Modality Logs for complete treatment.       Assessment/Plan  Instructed patient on how to advance home [facility] knee strengthening exercises.  Initiated stair training with apprehension noted and quad weakness present, but patient was successful ascending and descending a 4 inch step mainly using her right leg.  Advanced TKE strengthening to weightbearing position, with mild to moderate knee pain during the exercise and fatigue noted afterward.  Patient unable to achieve symmetrical sit to stand from chair even with cues due to quad weakness, but this is expected for her time postop.    Patient able to achieve right knee flexion ROM equal to previous visit today.        Timed:  Manual Therapy:    0     mins  52784;  Therapeutic Exercise:    23     mins  70166;     Neuromuscular Mary:   10    mins  30309;    Therapeutic Activity:     25     mins  16443;     Gait Trainin     mins  75555;     Ultrasound:     0     mins  62923;    Electrical Stimulation:    0     mins  50242 ( );    Untimed:  Electrical Stimulation:    0     mins  31593 ( );  Mechanical Traction:    0     mins  35520;     Timed Treatment:   58   mins   Total Treatment:    58    mins    Be Gross PT  Physical Therapist

## 2024-02-05 ENCOUNTER — TREATMENT (OUTPATIENT)
Dept: PHYSICAL THERAPY | Facility: CLINIC | Age: 64
End: 2024-02-05
Payer: COMMERCIAL

## 2024-02-05 DIAGNOSIS — M25.561 ACUTE PAIN OF RIGHT KNEE: Primary | ICD-10-CM

## 2024-02-05 DIAGNOSIS — Z96.651 TOTAL KNEE REPLACEMENT STATUS, RIGHT: ICD-10-CM

## 2024-02-05 DIAGNOSIS — Z98.890 HISTORY OF MAJOR ORTHOPEDIC SURGERY: ICD-10-CM

## 2024-02-05 PROCEDURE — 97530 THERAPEUTIC ACTIVITIES: CPT | Performed by: PHYSICAL THERAPIST

## 2024-02-05 PROCEDURE — 97112 NEUROMUSCULAR REEDUCATION: CPT | Performed by: PHYSICAL THERAPIST

## 2024-02-05 PROCEDURE — 97110 THERAPEUTIC EXERCISES: CPT | Performed by: PHYSICAL THERAPIST

## 2024-02-05 NOTE — PROGRESS NOTES
Physical Therapy Daily Progress Note    [address] [facility], [address]  [place] [address]      Patient: Sina Greenfield   : 1960  Diagnosis/ICD-10 Code:  Acute pain of right knee [M25.561]  Referring practitioner: Cherrie Milner NP  Date of Initial Visit: Type: THERAPY  Noted: 2024  Today's Date: 2024  Patient seen for 8 sessions           Patient reports: Increased right knee pain with progression of quad strengthening, but it is starting to feel better today.      Objective   See Exercise, Manual, and Modality Logs for complete treatment.       Assessment/Plan  Even with increased pain, patient maintained right knee flexion ROM equal to previous visit.  She demonstrated improved tolerance to 4 inch step up and down, no longer requiring CGA, though she would intermittently touch for stability.  Though she reported increased knee soreness after treatment, she felt more steady during gait.            Timed:  Manual Therapy:    0     mins  84012;  Therapeutic Exercise:    15     mins  27247;     Neuromuscular Mary:   10    mins  70364;    Therapeutic Activity:     25     mins  36437;     Gait Trainin     mins  53459;     Ultrasound:     0     mins  64919;    Electrical Stimulation:    0     mins  99632 ( );    Untimed:  Electrical Stimulation:    0     mins  66393 ( );  Mechanical Traction:    0     mins  09097;     Timed Treatment:   50   mins   Total Treatment:     50   mins    Be Gross PT  Physical Therapist

## 2024-02-09 ENCOUNTER — TREATMENT (OUTPATIENT)
Dept: PHYSICAL THERAPY | Facility: CLINIC | Age: 64
End: 2024-02-09
Payer: COMMERCIAL

## 2024-02-09 DIAGNOSIS — M25.561 ACUTE PAIN OF RIGHT KNEE: Primary | ICD-10-CM

## 2024-02-09 DIAGNOSIS — Z98.890 HISTORY OF MAJOR ORTHOPEDIC SURGERY: ICD-10-CM

## 2024-02-09 DIAGNOSIS — Z96.651 TOTAL KNEE REPLACEMENT STATUS, RIGHT: ICD-10-CM

## 2024-02-09 NOTE — PROGRESS NOTES
Physical Therapy Daily Progress Note    [address] [facility], [address]  [place] [address]      Patient: Sina Greenfield   : 1960  Diagnosis/ICD-10 Code:  Acute pain of right knee [M25.561]  Referring practitioner: Cherrie Milner NP  Date of Initial Visit: Type: THERAPY  Noted: 2024  Today's Date: 2024  Patient seen for 9 sessions           Patient reports: walking better and feeling less pain since last visit.      Objective   See Exercise, Manual, and Modality Logs for complete treatment.       Assessment/Plan  Progressed step up height to facilitate quad strength.  Introduced dynamic balance via narrow base of support, both forward and backward, with frequent loss of balance but patient able to self-correct without PT assist.  Static balance activities progressed to include compliant surface.    Right knee flexion ROM remains the same as previous visit.        Timed:  Manual Therapy:    0     mins  56361;  Therapeutic Exercise:    15     mins  42781;     Neuromuscular Mary:   23    mins  79486;    Therapeutic Activity:     15     mins  85112;     Gait Trainin     mins  96995;     Ultrasound:     0     mins  22244;    Electrical Stimulation:    0     mins  31369 ( );    Untimed:  Electrical Stimulation:    0     mins  14416 ( );  Mechanical Traction:    0     mins  93739;     Timed Treatment:   53   mins   Total Treatment:     53   mins    Be Gross PT  Physical Therapist

## 2024-02-13 ENCOUNTER — TREATMENT (OUTPATIENT)
Dept: PHYSICAL THERAPY | Facility: CLINIC | Age: 64
End: 2024-02-13
Payer: COMMERCIAL

## 2024-02-13 DIAGNOSIS — M25.561 ACUTE PAIN OF RIGHT KNEE: Primary | ICD-10-CM

## 2024-02-13 DIAGNOSIS — Z98.890 HISTORY OF MAJOR ORTHOPEDIC SURGERY: ICD-10-CM

## 2024-02-13 DIAGNOSIS — Z96.651 TOTAL KNEE REPLACEMENT STATUS, RIGHT: ICD-10-CM

## 2024-02-16 ENCOUNTER — TREATMENT (OUTPATIENT)
Dept: PHYSICAL THERAPY | Facility: CLINIC | Age: 64
End: 2024-02-16
Payer: COMMERCIAL

## 2024-02-16 DIAGNOSIS — Z96.651 TOTAL KNEE REPLACEMENT STATUS, RIGHT: ICD-10-CM

## 2024-02-16 DIAGNOSIS — M25.561 ACUTE PAIN OF RIGHT KNEE: Primary | ICD-10-CM

## 2024-02-16 DIAGNOSIS — Z98.890 HISTORY OF MAJOR ORTHOPEDIC SURGERY: ICD-10-CM

## 2024-02-19 ENCOUNTER — TELEPHONE (OUTPATIENT)
Dept: PHYSICAL THERAPY | Facility: CLINIC | Age: 64
End: 2024-02-19

## 2024-02-19 NOTE — TELEPHONE ENCOUNTER
Caller: Sina Greenfield    Relationship: Self         What was the call regarding:  NOT IN PATIENT GOT MESSAGE

## 2024-02-23 ENCOUNTER — TREATMENT (OUTPATIENT)
Dept: PHYSICAL THERAPY | Facility: CLINIC | Age: 64
End: 2024-02-23
Payer: COMMERCIAL

## 2024-02-23 DIAGNOSIS — M25.561 ACUTE PAIN OF RIGHT KNEE: Primary | ICD-10-CM

## 2024-02-23 DIAGNOSIS — Z96.651 TOTAL KNEE REPLACEMENT STATUS, RIGHT: ICD-10-CM

## 2024-02-23 DIAGNOSIS — Z98.890 HISTORY OF MAJOR ORTHOPEDIC SURGERY: ICD-10-CM

## 2024-02-23 NOTE — PROGRESS NOTES
Physical Therapy Daily Progress Note    [address] [facility], [address]  [place] [address]      Patient: Sina Greenfield   : 1960  Diagnosis/ICD-10 Code:  Acute pain of right knee [M25.561]  Referring practitioner: Cherrie Milner NP  Date of Initial Visit: Type: THERAPY  Noted: 2024  Today's Date: 2024  Patient seen for 12 sessions           Subjective   Patient reports walking 1/2 mile twice in the past week, with her left low back/gluteal region initially feeling pain in her right knee feeling more sore afterward.    Objective          Active Range of Motion     Right Knee   Flexion: 126 degrees   Extension: WFL    Passive Range of Motion     Right Knee   Flexion: 129 degrees       See Exercise, Manual, and Modality Logs for complete treatment.       Assessment/Plan  Patient continues to demonstrate improvements in right knee flexion ROM.  Introduced SL squats on [employer] through partial ROM, limited reps due to increased pain/soreness from the previous couple days.  Also introduced treadmill walking to build motor patterning and gait quality.  He required UE support during forward and backward walking.            Timed:  Manual Therapy:    0     mins  21875;  Therapeutic Exercise:    20     mins  12756;     Neuromuscular Mary:   12    mins  88555;    Therapeutic Activity:     15     mins  82471;     Gait Trainin     mins  76827;     Ultrasound:     0     mins  35754;    Electrical Stimulation:    0     mins  78815 ( );    Untimed:  Electrical Stimulation:    0     mins  50371 ( );  Mechanical Traction:    0     mins  10538;     Timed Treatment:   47   mins   Total Treatment:     47   mins    Be Gross PT  Physical Therapist

## 2024-02-26 ENCOUNTER — OFFICE VISIT (OUTPATIENT)
Dept: PSYCHIATRY | Facility: CLINIC | Age: 64
End: 2024-02-26
Payer: COMMERCIAL

## 2024-02-26 ENCOUNTER — OFFICE VISIT (OUTPATIENT)
Dept: ONCOLOGY | Facility: CLINIC | Age: 64
End: 2024-02-26
Payer: COMMERCIAL

## 2024-02-26 VITALS
DIASTOLIC BLOOD PRESSURE: 79 MMHG | BODY MASS INDEX: 35.99 KG/M2 | WEIGHT: 216 LBS | RESPIRATION RATE: 18 BRPM | HEIGHT: 65 IN | OXYGEN SATURATION: 98 % | SYSTOLIC BLOOD PRESSURE: 141 MMHG | TEMPERATURE: 98.2 F | HEART RATE: 103 BPM

## 2024-02-26 DIAGNOSIS — F41.1 GAD (GENERALIZED ANXIETY DISORDER): Primary | ICD-10-CM

## 2024-02-26 DIAGNOSIS — C50.412 MALIGNANT NEOPLASM OF UPPER-OUTER QUADRANT OF LEFT BREAST IN FEMALE, ESTROGEN RECEPTOR POSITIVE: ICD-10-CM

## 2024-02-26 DIAGNOSIS — Z17.0 MALIGNANT NEOPLASM OF UPPER-OUTER QUADRANT OF LEFT BREAST IN FEMALE, ESTROGEN RECEPTOR POSITIVE: ICD-10-CM

## 2024-02-26 PROCEDURE — 99213 OFFICE O/P EST LOW 20 MIN: CPT | Performed by: NURSE PRACTITIONER

## 2024-02-26 PROCEDURE — 90834 PSYTX W PT 45 MINUTES: CPT | Performed by: NURSE PRACTITIONER

## 2024-02-26 RX ORDER — BUPROPION HYDROCHLORIDE 100 MG/1
100 TABLET ORAL EVERY MORNING
COMMUNITY

## 2024-02-26 RX ORDER — MELOXICAM 7.5 MG/1
7.5 TABLET ORAL DAILY
COMMUNITY
Start: 2023-08-17

## 2024-02-26 RX ORDER — ANASTROZOLE 1 MG/1
1 TABLET ORAL DAILY
Qty: 90 TABLET | Refills: 3 | Status: SHIPPED | OUTPATIENT
Start: 2024-02-26

## 2024-02-26 RX ORDER — GABAPENTIN 100 MG/1
100 CAPSULE ORAL TAKE AS DIRECTED
COMMUNITY
Start: 2024-01-24

## 2024-02-26 NOTE — PROGRESS NOTES
CHIEF COMPLAINT:  Weight gain    Problem List:  Oncology/Hematology History Overview Note   1. [id number] ER+ WY+ Her2 negative invasive ductal carcinoma of the left breast with low risk Oncotype score 16.  Began adjuvant Arimidex 11/2021  A) bilateral mastectomy on 10/14/21.  Pathology showed a 1.5 cm high grade IDC.  0/2 SLN involved.  -Begin Arimidex November 2021 for a planned 5-7 years  2. Hypertension  3. Sarcoidosis diagnosed on mediastinoscopy never required treatment.  4. Anxiety  5. History of atypical cyst found in ovarian cyst shortly after birth of her daughter in 1999 followed with serial exams with no recurrence, now back to routine gynecology exams.    Oncology history timeline:  -10/1/2021 CT chestShow 6 mm pleural-based nodule right lung base for which continued follow-up in 6 months is recommended.  Dr. Schmid states that the enlargement of the lymph nodes within the hilar regions and mediastinum can easily be related to in part of her known sarcoidosis.  PET canceled  -10/14/2021  Right breast prophylactic skin sparing mastectomy benign.  Left breast skin sparing nipple sparing mastectomy invasive ductal 1.5 cm carcinoma [place] grade 3 wide margins.  0 out of 2 left sentinel nodes involved.  Louis [place] 8 out of 9.  No lymph vascular invasion.  ER 95% 3+  WY 1-5% 3+  HER-2/will 5% 1+  Oncotype score 16.  4% distant recurrence risk at 9 years on hormone blockade alone.  Less than 1% adjuvant chemotherapeutic benefit.    -10/27/2021 consultation Dr. Krysten Anguiano: She discussedthe use of the Oncotype recurrence score.  This test provides information about the biology and risk of recurrence for ER positive Her2-negative breast cancers.  It is clear from previous studies that patients who have a low risk Oncotype do not benefit from adjuvant chemotherapy.  Conversely, patients with a high risk Oncotype can have a significant benefit from adjuvant chemotherapy.  Scores in the intermediate risk  group may have a small benefit based on the patient's age.  We discussed that the Oncotype test is most useful if chemotherapy is an option that the patient is considering.      Regardless of the decision about chemotherapy, she is a candidate for adjuvant endocrine therapy with an aromatase inhibitor.  We reviewed the potential side effects of anastrozole including hot flashes, vaginal dryness, joint pain, decrease in bone density, and mood or sleep disturbance.     She mentions that she has a trip to [place] planned in early February.  We discussed that if she does receive chemotherapy, this potentially could be ending shortly before her departure.     F/u 3 weeks to review oncotype result.    -10/28/2021 left nipple excision without neoplasm    -11/18/2021 [facility] medical oncology follow-up visit: Has her tissue expanders and beyond the discomfort there is doing well.  Alkaline phosphatase slightly elevated on baseline testing.  Talked about doing bone scanning but for now we will simply repeat the CMP prior to return to my nurse practitioner in 2 months for survivorship visit.  In the meantime we reviewed her Oncotype score which has a low risk Oncotype and no chemotherapy is recommended.  I would give her at least 5 years and preferably 7 of Arimidex.  Because she has had bilateral mastectomies, she needs no radiation.  For PTSD symptoms, we will refer her to [facility] and she will need OB/GYN evaluation which she is going to arrange given her history of ovarian abnormalities as outlined above.  She is going to see pulmonary medicine because of her sarcoidosis.  I think the abnormality of the pleura seen on CT is likely related to her sarcoid and not likely breast metastasis but will get pulmonary opinion.    -1/11/2022 oncology survivorship visit completed    -7/26/2022 [facility] Oncology clinic follow-up: Sina is doing well, she is tolerating Arimidex with no significant side effects.  We will plan on 5-7  years of adjuvant therapy, she began Arimidex 2021.  She will need periodic bone density testing and has not had that yet, I went ahead and ordered it today to get baseline, she states that they have been normal in the past.  She has had her expanders replaced with implants, she plans on further revision in October.  She has no evidence of disease on clinical exam and no new worrisome symptoms.  She continues to follow with ROBY Acevedo for counseling and her anxiety, it is under better control with medication changes, she is now on escitalopram and only takes Xanax rarely.  She is up-to-date on routine gynecological exam.  She did asked today about having her ovaries removed since she has had breast cancer, she thought that her gynecologist mentioned that this is done often times when patients have breast cancer.  I discussed with her that there is no recommendation or indication for routine oophorectomy in the absence of genetic predisposition, we do not just recommend removal of the ovaries when someone has had breast cancer.  She has no family history that would warrant genetic testing.  She does have a history of apparently atypical ovarian cyst that was found at the time of her daughter's birth which was a  22 years ago and was followed closely for quite some time but apparently everything returned negative and she resumed routine follow-up.  She saw pulmonologist recently, Dr. Davila in regards to her history of sarcoidosis and right lower lobe nodule.  She was pleased to find out that the right lower lobe nodule is indicative of benign granuloma and there were no other worrisome findings on CT scan, she will follow with pulmonology annually, no plans for further scanning in the absence of new symptoms.    -2022 DEXA scan shows left femoral neck osteopenia    -11/3/2023 breast revision    -2023 [facility], operated by [facility] medical oncology follow-up visit: No somatic complaints to suggest  recurrence.  Asked her to get with primary care for osteopenia therapy of their choosing and would need repeat DEXA scan in a couple of years.  We will have her see my nurse practitioner back for chest wall exam in 6 months.  Continue Arimidex until at least November 2026 and preferably through November 2028 at least.  If tolerating could go as far as November 2031.  In part depends on her bone densities.     Malignant neoplasm of upper-outer quadrant of left breast in female, estrogen receptor positive   10/14/2021 Initial Diagnosis    Malignant neoplasm of upper-outer quadrant of left female breast (HCC)     10/27/2021 Cancer Staged    Staging form: Breast, AJCC 8th Edition  - Pathologic: Stage IA (pT1c, pN0, cM0, G3, ER+, TX+, HER2-) - Signed by Krysten Anguiano MD on 10/27/2021     11/3/2021 -  Other Event    Oncotype score 16 with 4% distant recurrence risk at 9 years on hormone blockade alone with less than 1% adjuvant benefit from chemotherapy.     11/18/2021 -  Hormonal Therapy    Arimidex for a planned 5-7 years     7/28/2022 Imaging    DEXA bone density shows osteopenia of the left femoral neck, lowest T-score -1.1.         HISTORY OF PRESENT ILLNESS:  The patient is a 63 y.o. female, here for follow up on management of history of ER positive left breast cancer currently on adjuvant therapy with Arimidex.  Since we saw Sina last she had right knee replacement in January.  She reports that she is doing well with physical therapy, she is now able to walk up to 1 mile.  She has no new or concerning bony aches or pains.  She continues to report aggravation of her weight gain.  She is tolerating Arimidex with no unusual side effects.  She has no new or concerning findings on chest exam.    Past Medical History:   Diagnosis Date    6 mm noncalcified right lower lobe nodule 11/30/2021    Anxiety     Arthritis     COVID-19 12/29/2021    Fracture of foot bone, left, closed 2021    Hx of skin cancer, basal cell      "Hypertension     Malignant neoplasm of upper-outer quadrant of left female breast 10/14/2021    Sarcoidosis     10 years ago    Wears reading eyeglasses      Past Surgical History:   Procedure Laterality Date    BREAST ABSCESS INCISION AND DRAINAGE Left 10/28/2021    Procedure: DEBRIDEMENT LEFT BREAST NIPPLE;  Surgeon: Mic Hannah MD;  Location:  [facility];  Service: Plastics;  Laterality: Left;    BREAST RECONSTRUCTION, BREAST TISSUE EXPANDER INSERTION Bilateral 10/14/2021    Procedure: IMMEDIATE BILATERAL BREAST RECONSTRUCTION WITH TISSUE EXPANDERS AND ALLODERM;  Surgeon: Mic Hannah MD;  Location:  [facility];  Service: Plastics;  Laterality: Bilateral;    BREAST RECONSTRUCTION, BREAST TISSUE EXPANDER REMOVAL, IMPLANT INSERTION Bilateral 2022    Procedure: BREAST RECONSTRUCTION, BREAST TISSUE EXPANDER EXCHANGE TO PERMANENT IMPLANTS BILATERAL;  Surgeon: Mic Hannah MD;  Location:  [facility];  Service: Plastics;  Laterality: Bilateral;    BREAST SURGERY Left 10/28/2021    Procedure: COMPLEX CLOSURE LEFT;  Surgeon: Mic Hannah MD;  Location:  [facility];  Service: Plastics;  Laterality: Left;    CARPAL TUNNEL RELEASE Bilateral      SECTION      with ovaraian cyst removed and found \"borderline cells\" in the cyst followed and released 6 years ago    COLONOSCOPY      FAT GRAFTING Bilateral 2022    Procedure: BREAST REVISION WITH FAT GRAFTING BILATERAL;  Surgeon: Mic Hannah MD;  Location:  [facility];  Service: Plastics;  Laterality: Bilateral;    FOOT SURGERY Left     hardware    KNEE CARTILAGE SURGERY Left     LYMPH NODE BIOPSY      2010    MASTECTOMY W/ SENTINEL NODE BIOPSY Bilateral 10/14/2021    Procedure: SKIN SPARING MASTECTOMY, LEFT SENITNEL NODE BIOPSY, RIGHT PROPHYLACTIC SKIN SPARING MASTECTOMY;  Surgeon: Sylvester Mc MD;  Location:  [facility];  Service: General;  Laterality: Bilateral;    NIPPLE RECONSTRUCTION Left 2022    Procedure: NIPPLE " "RECONSTRUCTION- LEFT;  Surgeon: Mic Hannah MD;  Location: [facility];  Service: Plastics;  Laterality: Left;    ORIF FOOT FRACTURE Left 2021    REPLACEMENT TOTAL KNEE Right 01/2024     w/Dr. Lynch    TOTAL KNEE ARTHROPLASTY Left        No Known Allergies    Family History and Social History reviewed and changed as necessary    REVIEW OF SYSTEM:   No new somatic concerns    PHYSICAL EXAM:  Well-developed, well-nourished healthy-appearing female in no distress  No cervical, supraclavicular or axillary nodes palpable on exam  Skin tissue over bilateral reconstructed breast is soft, no abnormal masses, nodules, rashes or lesions    Vitals:    02/26/24 1044   BP: 141/79   Pulse: 103   Resp: 18   Temp: 98.2 °F (36.8 °C)   SpO2: 98%   Weight: 98 kg (216 lb)   Height: 163.8 cm (64.5\")     Vitals:    02/26/24 1044   PainSc: 0-No pain          ECOG score: 0           Vitals reviewed.  Labs available in epic reviewed at time of visit    Lab Results   Component Value Date    HGB 11.9 01/10/2024    HCT 36.6 01/10/2024    MCV 97 01/10/2024     01/10/2024    WBC 10.83 (H) 01/10/2024       Lab Results   Component Value Date    GLUCOSE 106 (H) 10/27/2022    BUN 18 10/27/2022    CREATININE 0.89 10/27/2022     10/27/2022    K 4.6 10/27/2022     10/27/2022    CO2 28.0 10/27/2022    CALCIUM 9.4 10/27/2022    PROTEINTOT 7.7 11/18/2021    ALBUMIN 4.3 01/02/2024    BILITOT 0.2 11/18/2021    ALKPHOS 126 (H) 11/18/2021    AST 24 11/18/2021    ALT 27 11/18/2021             ASSESSMENT & PLAN:    1. [id number] ER+ WY+ Her2 negative invasive ductal carcinoma of the left breast with low risk Oncotype score 16.  Began adjuvant Arimidex 11/2021  A) bilateral mastectomy on 10/14/21.  Pathology showed a 1.5 cm high grade IDC.  0/2 SLN involved.  -Begin Arimidex November 2021 for a planned 5-7 years  2. Hypertension  3. Sarcoidosis diagnosed on mediastinoscopy never required treatment.  4. Anxiety  5. History of atypical cyst " found in ovarian cyst shortly after birth of her daughter in 1999 followed with serial exams with no recurrence, now back to routine gynecology exams.  6.  Overweight, BMI 36.5    Oncology history timeline:  -10/1/2021 CT chestShow 6 mm pleural-based nodule right lung base for which continued follow-up in 6 months is recommended.  Dr. Schmid states that the enlargement of the lymph nodes within the hilar regions and mediastinum can easily be related to in part of her known sarcoidosis.  PET canceled  -10/14/2021  Right breast prophylactic skin sparing mastectomy benign.  Left breast skin sparing nipple sparing mastectomy invasive ductal 1.5 cm carcinoma Epi grade 3 wide margins.  0 out of 2 left sentinel nodes involved.  [place] 8 out of 9.  No lymph vascular invasion.  ER 95% 3+  NE 1-5% 3+  HER-2/will 5% 1+  Oncotype score 16.  4% distant recurrence risk at 9 years on hormone blockade alone.  Less than 1% adjuvant chemotherapeutic benefit.    -10/27/2021 consultation Dr. Krysten Anguiano: She discussedthe use of the Oncotype recurrence score.  This test provides information about the biology and risk of recurrence for ER positive Her2-negative breast cancers.  It is clear from previous studies that patients who have a low risk Oncotype do not benefit from adjuvant chemotherapy.  Conversely, patients with a high risk Oncotype can have a significant benefit from adjuvant chemotherapy.  Scores in the intermediate risk group may have a small benefit based on the patient's age.  We discussed that the Oncotype test is most useful if chemotherapy is an option that the patient is considering.      Regardless of the decision about chemotherapy, she is a candidate for adjuvant endocrine therapy with an aromatase inhibitor.  We reviewed the potential side effects of anastrozole including hot flashes, vaginal dryness, joint pain, decrease in bone density, and mood or sleep disturbance.     She mentions that she has  a trip to [place] planned in early February.  We discussed that if she does receive chemotherapy, this potentially could be ending shortly before her departure.     F/u 3 weeks to review oncotype result.    -10/28/2021 left nipple excision without neoplasm    -11/18/2021 [facility] medical oncology follow-up visit: Has her tissue expanders and beyond the discomfort there is doing well.  Alkaline phosphatase slightly elevated on baseline testing.  Talked about doing bone scanning but for now we will simply repeat the CMP prior to return to my nurse practitioner in 2 months for survivorship visit.  In the meantime we reviewed her Oncotype score which has a low risk Oncotype and no chemotherapy is recommended.  I would give her at least 5 years and preferably 7 of Arimidex.  Because she has had bilateral mastectomies, she needs no radiation.  For PTSD symptoms, we will refer her to [facility] and she will need OB/GYN evaluation which she is going to arrange given her history of ovarian abnormalities as outlined above.  She is going to see pulmonary medicine because of her sarcoidosis.  I think the abnormality of the pleura seen on CT is likely related to her sarcoid and not likely breast metastasis but will get pulmonary opinion.    -1/11/2022 oncology survivorship visit completed    -7/26/2022 [facility] Oncology clinic follow-up: Sina is doing well, she is tolerating Arimidex with no significant side effects.  We will plan on 5-7 years of adjuvant therapy, she began Arimidex November 2021.  She will need periodic bone density testing and has not had that yet, I went ahead and ordered it today to get baseline, she states that they have been normal in the past.  She has had her expanders replaced with implants, she plans on further revision in October.  She has no evidence of disease on clinical exam and no new worrisome symptoms.  She continues to follow with ROBY Acevedo for counseling and her anxiety, it is under  better control with medication changes, she is now on escitalopram and only takes Xanax rarely.  She is up-to-date on routine gynecological exam.  She did asked today about having her ovaries removed since she has had breast cancer, she thought that her gynecologist mentioned that this is done often times when patients have breast cancer.  I discussed with her that there is no recommendation or indication for routine oophorectomy in the absence of genetic predisposition, we do not just recommend removal of the ovaries when someone has had breast cancer.  She has no family history that would warrant genetic testing.  She does have a history of apparently atypical ovarian cyst that was found at the time of her daughter's birth which was a  22 years ago and was followed closely for quite some time but apparently everything returned negative and she resumed routine follow-up.  She saw pulmonologist recently, Dr. Davila in regards to her history of sarcoidosis and right lower lobe nodule.  She was pleased to find out that the right lower lobe nodule is indicative of benign granuloma and there were no other worrisome findings on CT scan, she will follow with pulmonology annually, no plans for further scanning in the absence of new symptoms.    -2022 DEXA scan shows left femoral neck osteopenia    -11/3/2023 breast revision    -2023 [facility] medical oncology follow-up visit: No somatic complaints to suggest recurrence.  Asked her to get with primary care for osteopenia therapy of their choosing and would need repeat DEXA scan in a couple of years.  We will have her see my nurse practitioner back for chest wall exam in 6 months.  Continue Arimidex until at least 2026 and preferably through 2028 at least.  If tolerating could go as far as 2031.  In part depends on her bone densities.    -2023 [facility] Oncology clinic follow-up: Sina has no evidence of recurrent breast cancer  on clinical exam and no new worrisome symptoms.  She is doing well on Arimidex, we plan on 5-7 years adjuvant therapy, she began Arimidex November 2021.  When she is approaching 5 years would give consideration to BCI testing.  She is up-to-date on bone density scan, this will be due again around July 2024.  She does take calcium and vitamin D.  She walks when she can but her right knee pain limits her ability to walk any distance.  She follows with orthopedics.      -2/26/2024 [facility] oncology clinic follow-up: Sina continues to do well, she has no evidence of recurrent breast cancer on clinical exam and no new worrisome symptoms.  She has had her right knee replaced and is recovering well.  We plan on 5-7 years of therapy with Arimidex.  I did send a refill to her pharmacy today for 1 year supply.  She expresses frustration with ongoing weight gain, now that she has had her knee replaced hopefully she will be able to increase her physical activity in the upcoming months and this may help.  She works with a nutritionist.  We discussed possibly seeking the advice of a  also.  Will likely consider BCI testing when we are approaching 5 years to see if she would benefit from extended adjuvant therapy.  She has an appointment in March with Dr. Jeffrey and we will arrange bone density testing for later this summer.    Return to clinic in 6 months for follow-up    This was a level 3, limited MDM visit with stable chronic illness and prescription drug management.    Cherrie Claudio, APRN    02/26/2024

## 2024-02-26 NOTE — PROGRESS NOTES
THERAPY PROGRESS NOTE  02/26/24    Subjective   Sina Greenfield is a 63 y.o. female who met with the undersigned for a scheduled individual outpatient therapy session in person face to face. 2.5 years out from breast cancer diagnosis. She is s/p bilateral mastectomies and reconstruction for breast cancer. She is on an anastrozole for at least 5 years. Dr. Morris is her medical oncologist and now followed by [facility] oncology for surveillance.  Dr. Hannah is her plastic surgeon.  Patient is s/p right knee replacement January 9th 2024 and in PT doing well.     Chief Complaint: ALEXIS    Therapy:  Start Time: 0955   Stop Time: 1043    ( 45) minutes was spent for psychotherapy. Assisted patient in processing patient's ALEXIS. Acknowledged and normalized patient's thoughts, feelings, and concerns. Utilized cognitive behavioral therapy to assist the patient in recognizing more appropriate coping mechanisms when they become agitated/anxious which are proven effective in reducing the severity of frequency of symptoms.     CLINICAL MANEUVERING/INTERVENTION:   Patient talked about current stressors, primarily challenges of losing weight and recovery from knee replacement . Venting of frustrations of difficulty losing weight was conducted. She is working with a dietician.  Feelings were processed and validated, both negative and positive. Flushing out worries and concerns was conducted in order to diminish emotional tension. Processing current strategy was conducted. Ways in which patient may take stress off  herself in a purposeful manner was discussed. She plans going back to work in April part time at the [employer] as [employer]. She plans trips to [place] as her daughter is taking graduate classes and is in [place]. Stress is well managed she states other than weight issues and recovery from knee but that is going well. Utilized motivational interviewing techniques including complex reflections to attempt to assist the patient  and focusing on the positive and to maintain and encourage calming outlook.        Appearance: appropriate  Hygiene:   good  Cooperation:  Cooperative  Eye Contact:  Good  Psychomotor Behavior:  No psychomotor agitation/retardation, No EPS, No motor tics  Mood:  within normal limits  Affect:  Appropriate  Hopelessness: Denies  Speech:  Normal  Thought Process:  Linear  Thought Content:  Normal  Concentration: Normal   Suicidal:  None  Homicidal:  None  Hallucinations:  None  Delusion:  None  Memory:  Intact  Orientation:  Person, Place, Time and Situation  Reliability:  good  Insight:  Fair  Judgement: good  Impulse Control: good  Estimated Intelligence: average range    ALEXIS-7:    Over the last two weeks, how often have you been bothered by the following problems?  Feeling nervous, anxious or on edge: Several days  Not being able to stop or control worrying: Not at all  Worrying too much about different things: Not at all  Trouble Relaxing: Not at all  Being so restless that it is hard to sit still: Not at all  Becoming easily annoyed or irritable: Not at all  Feeling afraid as if something awful might happen: Not at all  ALEXIS 7 Total Score: 1  If you checked any problems, how difficult have these problems made it for you to do your work, take care of things at home, or get along with other people: Not difficult at all  0-4: Minimal anxiety  5-9: Mild anxiety  10-14: Moderate anxiety  15-21: Severe anxiety  PHQ-9:      2/26/2024    10:00 AM   PHQ-2/PHQ-9 Depression Screening   Little Interest or Pleasure in Doing Things 0-->not at all   Feeling Down, Depressed or Hopeless 0-->not at all   PHQ-9: Brief Depression Severity Measure Score 0      5-9: Minimal symptoms  10-14: Mild depression  15-19: Moderate depression  Greater than 20: Major depression sever    ASSESSMENT: ALEXIS    PATIENTS SUPPORT NETWORK INCLUDES:family including  and daughter, friends   FUNCTIONAL STATUS: little IMPAIRMENT right knee  replaced  PROGNOSIS: GOOD WITH ONGOING TREATMENT    PLAN:    Patient will continue therapy and  adhere to medication regimen as prescribed. Provide Cognitive Behavioral Therapy and Solution Focused Therapy to improve functioning, maintain stability, and avoid decompensation and the need for higher level of care. Instructed to call for questions or concerns and return early if necessary.      Return if symptoms worsen or fail to improve.

## 2024-02-27 ENCOUNTER — TREATMENT (OUTPATIENT)
Dept: PHYSICAL THERAPY | Facility: CLINIC | Age: 64
End: 2024-02-27
Payer: COMMERCIAL

## 2024-02-27 DIAGNOSIS — Z98.890 HISTORY OF MAJOR ORTHOPEDIC SURGERY: ICD-10-CM

## 2024-02-27 DIAGNOSIS — M25.561 ACUTE PAIN OF RIGHT KNEE: Primary | ICD-10-CM

## 2024-02-27 DIAGNOSIS — Z96.651 TOTAL KNEE REPLACEMENT STATUS, RIGHT: ICD-10-CM

## 2024-02-27 NOTE — PROGRESS NOTES
Physical Therapy Daily Progress Note    [address] [facility], [address]  [place] [address]      Patient: Sina Greenfield   : 1960  Diagnosis/ICD-10 Code:  Acute pain of right knee [M25.561]  Referring practitioner: Cherrie Milner NP  Date of Initial Visit: Type: THERAPY  Noted: 2024  Today's Date: 2024  Patient seen for 13 sessions           Subjective   Patient reports her knee is still sore today but she has been active today. Stairs remain painful.    Objective          Active Range of Motion     Right Knee   Flexion: 126 degrees   Extension: WFL    Passive Range of Motion     Right Knee   Flexion: 129 degrees   Extension: WFL    Strength/Myotome Testing     Right Knee   Flexion: 4+  Extension: 4+ (pain)      See Exercise, Manual, and Modality Logs for complete treatment.       Assessment/Plan  Reassess objective measures prior to patient's return to her surgeon tomorrow.  Right knee AROM is normal and overall strength is improving, though quad weakness remains her primary deficit.  She is ambulating without use of AD.  Discussed transitioning her exercise program to the gym and weaning off of PT in the next month.  Patient was receptive to this, though she is not a member of the gym at this time.  Patient and PT decided to reduce frequency of PT to once per week and for patient to look into gym membership this week to transition her program.            Timed:  Manual Therapy:    0     mins  76778;  Therapeutic Exercise:    19     mins  53299;     Neuromuscular Mary:   10    mins  06904;    Therapeutic Activity:     15     mins  64954;     Gait Trainin     mins  54129;     Ultrasound:     0     mins  58412;    Electrical Stimulation:    0     mins  32339 ( );    Untimed:  Electrical Stimulation:    0     mins  79773 ( );  Mechanical Traction:    0     mins  98704;     Timed Treatment:   44   mins   Total Treatment:     44   mins    Be Gross PT  Physical Therapist

## 2024-03-04 ENCOUNTER — TREATMENT (OUTPATIENT)
Dept: PHYSICAL THERAPY | Facility: CLINIC | Age: 64
End: 2024-03-04
Payer: COMMERCIAL

## 2024-03-04 NOTE — PROGRESS NOTES
"Physical Therapy Daily Progress Note    [address], [address]  [place] [address]      Patient: Sina Greenfield   : 1960  Diagnosis/ICD-10 Code:  No primary diagnosis found.  Referring practitioner: Cherrie Milner NP  Date of Initial Visit: Type: THERAPY  Noted: 2024  Today's Date: 3/4/2024  Patient seen for 14 sessions           Subjective   Patient reports her R knee, L hip, and \"everywhere\" has been sore in the past week. L hip soreness occurs with walking down hill.    Objective   See Exercise, Manual, and Modality Logs for complete treatment.       Assessment/Plan  Hip IR stretching provided relief of left groin symptoms during ambulation.  Patient was encouraged to have her and her  stretch her left hip joint as instructed during her previous POC.  Able to progress right quad strengthening to bias eccentric phase and close chain, but otherwise treatment was kept short to avoid symptom increase.            Timed:  Manual Therapy:    0     mins  61543;  Therapeutic Exercise:    19     mins  68583;     Neuromuscular Mary:   10    mins  59206;    Therapeutic Activity:     15     mins  74960;     Gait Trainin     mins  37609;     Ultrasound:     0     mins  31689;    Electrical Stimulation:    0     mins  38779 ( );    Untimed:  Electrical Stimulation:    0     mins  29648 ( );  Mechanical Traction:    0     mins  19284;     Timed Treatment:   44   mins   Total Treatment:     44   mins    Be Gross PT  Physical Therapist  "

## 2024-03-15 ENCOUNTER — TREATMENT (OUTPATIENT)
Dept: PHYSICAL THERAPY | Facility: CLINIC | Age: 64
End: 2024-03-15
Payer: COMMERCIAL

## 2024-03-15 DIAGNOSIS — M25.561 ACUTE PAIN OF RIGHT KNEE: Primary | ICD-10-CM

## 2024-03-15 DIAGNOSIS — Z96.651 TOTAL KNEE REPLACEMENT STATUS, RIGHT: ICD-10-CM

## 2024-03-15 NOTE — PROGRESS NOTES
Physical Therapy Daily Progress Note    [address] [facility], [address]  [place] [address]      Patient: Sina Greenfield   : 1960  Diagnosis/ICD-10 Code:  Acute pain of right knee [M25.561]  Referring practitioner: Cherrie Milner NP  Date of Initial Visit: Type: THERAPY  Noted: 2024  Today's Date: 3/15/2024  Patient seen for 15 sessions           Subjective   Patient reports joining her gym, but is concerned about how sore she has felt after exercising and how tired she gets on treadmill.    Objective   See Exercise, Manual, and Modality Logs for complete treatment.       Assessment/Plan  Trialed treadmill walking at beginning of today's treatment, during which she demonstrated good stability and no loss of balance.  She states that this felt much better than at the gym, as she normally completes treadmill after 25 minutes of stationary bike.    Modified hamstring strengthening exercises as patient reported inability to use the machines at the gym.  Patient advised that significant soreness following back-to-back days of strength training the same muscle groups, in addition to stiffness later in the day following a workout, is normal and expected when initiating strength training routine.  She was advised that her overall strength, functional capacity, and mobility will increase over the coming months.        Timed:  Manual Therapy:    0     mins  41197;  Therapeutic Exercise:    15     mins  21101;     Neuromuscular Mary:   0    mins  42056;    Therapeutic Activity:     25     mins  72088;     Gait Trainin     mins  47411;     Ultrasound:     0     mins  91377;    Electrical Stimulation:    0     mins  67169 ( );    Untimed:  Electrical Stimulation:    0     mins  84996 ( );  Mechanical Traction:    0     mins  33149;     Timed Treatment:   40   mins   Total Treatment:     40   mins    Be Gross PT  Physical Therapist

## 2024-03-25 ENCOUNTER — TREATMENT (OUTPATIENT)
Dept: PHYSICAL THERAPY | Facility: CLINIC | Age: 64
End: 2024-03-25
Payer: COMMERCIAL

## 2024-03-25 DIAGNOSIS — Z96.651 TOTAL KNEE REPLACEMENT STATUS, RIGHT: ICD-10-CM

## 2024-03-25 DIAGNOSIS — M25.561 ACUTE PAIN OF RIGHT KNEE: Primary | ICD-10-CM

## 2024-03-25 DIAGNOSIS — Z98.890 HISTORY OF MAJOR ORTHOPEDIC SURGERY: ICD-10-CM

## 2024-03-25 NOTE — PROGRESS NOTES
Physical Therapy Daily Progress Note    [address] [facility], [address]  [place] [address]      Patient: Sina Greenfield   : 1960  Diagnosis/ICD-10 Code:  Acute pain of right knee [M25.561]  Referring practitioner: Cherrie Milner NP  Date of Initial Visit: Type: THERAPY  Noted: 2024  Today's Date: 3/25/2024  Patient seen for 16 sessions           Subjective   Patient reports less knee pain and improved mobility, but her L hip continues to be a limiting factor. It gets stiff after prolonged sitting/standing and initial walking, but it loosens up with activity.    Objective   See Exercise, Manual, and Modality Logs for complete treatment.       Assessment/Plan  Short bout of right knee PROM flexion restored AROM flexion to 127 degrees, PROM to 128 degrees.  Reviewed hamstring strengthening exercises on HEP, allowing for patient to resume hamstring curls.  Continued discussion of her symptomatic left hip OA, providing recommendations of how to modify/limit her exercises to avoid exacerbation of her left hip.  Advised patient to practice walking on uneven surfaces to prepare for her upcoming vacation.            Timed:  Manual Therapy:    8     mins  85077;  Therapeutic Exercise:    21     mins  50288;     Neuromuscular Mary:   0    mins  12622;    Therapeutic Activity:     25     mins  11818;     Gait Trainin     mins  20681;     Ultrasound:     0     mins  88607;    Electrical Stimulation:    0     mins  23091 ( );    Untimed:  Electrical Stimulation:    0     mins  83644 ( );  Mechanical Traction:    0     mins  91020;     Timed Treatment:   54   mins   Total Treatment:     54   mins    Be Gross PT  Physical Therapist

## 2024-04-18 ENCOUNTER — TREATMENT (OUTPATIENT)
Dept: PHYSICAL THERAPY | Facility: CLINIC | Age: 64
End: 2024-04-18
Payer: COMMERCIAL

## 2024-04-18 DIAGNOSIS — Z98.890 HISTORY OF MAJOR ORTHOPEDIC SURGERY: ICD-10-CM

## 2024-04-18 DIAGNOSIS — Z96.651 TOTAL KNEE REPLACEMENT STATUS, RIGHT: Primary | ICD-10-CM

## 2024-04-18 NOTE — PROGRESS NOTES
Physical Therapy Daily Progress Note and Discharge Summary      [address], [address]  [place] [address]    Patient: Sina Greenfield   : 1960  Diagnosis/ICD-10 Code:  Total knee replacement status, right [Z96.651]  Referring practitioner: Cherrie Milner NP  Date of Initial Visit: Type: THERAPY  Noted: 2024  Today's Date: 2024  Patient seen for 17 sessions      Subjective:     Subjective Questionnaire: LEFS: 57/80    Subjective Evaluation    History of Present Illness  Date of surgery: 2024    Subjective comment: Patient reports 9-10k steps at work an working up to 3 days/week. It still gets stiff at times but it has felt better with more execise.Pain  Current pain ratin  At best pain ratin  At worst pain ratin       Objective          Active Range of Motion     Right Knee   Flexion: 116 degrees   Extension: WFL    Passive Range of Motion     Right Knee   Flexion: 120 degrees   Extension: WFL    Strength/Myotome Testing     Right Knee   Flexion: 5  Right knee extension strength: 5-        Goals  Short term goals (4 weeks)  1. Patient to be compliant with initial HEP. Met   2. Patient to demonstrate right knee PROM flexion > 110 degrees. Met   3. Patient to improve LEFS to greater than or equal to 24/80.  Met  4.  Patient to demonstrate 10 SLRs on right without quad lag to demonstrate improving quadricep strength.  Met     Long Term goals (12 weeks)  1. Patient to demonstrate right knee AROM flexion > 125 degrees. Met   2. Patient to demonstrate pain free and normal strength with MMTs. In progress  3. Patient to ascend and descend eight 8 inch steps reciprocally with one handrail with minimal to no antalgia or difficulty. Met   4. Patient to demonstrate independence with home exercise program.  Met   5. Patient to improve LEFS to greater than or equal to 40/80. Met     Discharge Summary  Patient reports significant functional improvements related to her right knee in  recent weeks.  She reports minimal pain.  Right knee strength continues to improve, but she has lost slight flexion ROM since last visit as she has decreased frequency of stretching.  Patient advised to resume flexion stretching to maintain ROM, which should return to previous levels quickly.  She is independent with HEP for right knee strength and mobility and is ready for DC at this time.    Timed:  Manual Therapy:    0     mins  77786;  Therapeutic Exercise:    8     mins  86584;     Neuromuscular Mary:    0    mins  55374;    Therapeutic Activity:     23     mins  63187;     Gait Trainin     mins  34819;     Ultrasound:     0     mins  33463;    Electrical Stimulation:    0     mins  07588 ( );    Untimed:  Electrical Stimulation:    0     mins  20135 ( );  Mechanical Traction:    0     mins  69536;     Timed Treatment:   31   mins   Total Treatment:     31   mins      Be Gross PT  Physical Therapist

## 2024-08-26 ENCOUNTER — OFFICE VISIT (OUTPATIENT)
Dept: ONCOLOGY | Facility: CLINIC | Age: 64
End: 2024-08-26
Payer: COMMERCIAL

## 2024-08-26 VITALS
WEIGHT: 215 LBS | DIASTOLIC BLOOD PRESSURE: 76 MMHG | TEMPERATURE: 97.6 F | RESPIRATION RATE: 18 BRPM | HEIGHT: 65 IN | HEART RATE: 77 BPM | BODY MASS INDEX: 35.82 KG/M2 | OXYGEN SATURATION: 97 % | SYSTOLIC BLOOD PRESSURE: 158 MMHG

## 2024-08-26 DIAGNOSIS — Z17.0 MALIGNANT NEOPLASM OF UPPER-OUTER QUADRANT OF LEFT BREAST IN FEMALE, ESTROGEN RECEPTOR POSITIVE: Primary | ICD-10-CM

## 2024-08-26 DIAGNOSIS — C50.412 MALIGNANT NEOPLASM OF UPPER-OUTER QUADRANT OF LEFT BREAST IN FEMALE, ESTROGEN RECEPTOR POSITIVE: Primary | ICD-10-CM

## 2024-08-26 PROCEDURE — 99213 OFFICE O/P EST LOW 20 MIN: CPT | Performed by: NURSE PRACTITIONER

## 2024-08-26 NOTE — PROGRESS NOTES
CHIEF COMPLAINT: Breast cancer    Problem List:  Oncology/Hematology History Overview Note   1. [id number] ER+ WY+ Her2 negative invasive ductal carcinoma of the left breast with low risk Oncotype score 16.  Began adjuvant Arimidex 11/2021  A) bilateral mastectomy on 10/14/21.  Pathology showed a 1.5 cm high grade IDC.  0/2 SLN involved.  -Begin Arimidex November 2021 for a planned 5-7 years  2. Hypertension  3. Sarcoidosis diagnosed on mediastinoscopy never required treatment.  4. Anxiety  5. History of atypical cyst found in ovarian cyst shortly after birth of her daughter in 1999 followed with serial exams with no recurrence, now back to routine gynecology exams.    Oncology history timeline:  -10/1/2021 CT chestShow 6 mm pleural-based nodule right lung base for which continued follow-up in 6 months is recommended.  Dr. Schmid states that the enlargement of the lymph nodes within the hilar regions and mediastinum can easily be related to in part of her known sarcoidosis.  PET canceled  -10/14/2021  Right breast prophylactic skin sparing mastectomy benign.  Left breast skin sparing nipple sparing mastectomy invasive ductal 1.5 cm carcinoma Captiva grade 3 wide margins.  0 out of 2 left sentinel nodes involved.  [place] 8 out of 9.  No lymph vascular invasion.  ER 95% 3+  [place] 1-5% 3+  HER-2/will 5% 1+  Oncotype score 16.  4% distant recurrence risk at 9 years on hormone blockade alone.  Less than 1% adjuvant chemotherapeutic benefit.    -10/27/2021 consultation Dr. Krytsen Anguiano: She discussedthe use of the Oncotype recurrence score.  This test provides information about the biology and risk of recurrence for ER positive Her2-negative breast cancers.  It is clear from previous studies that patients who have a low risk Oncotype do not benefit from adjuvant chemotherapy.  Conversely, patients with a high risk Oncotype can have a significant benefit from adjuvant chemotherapy.  Scores in the intermediate risk  group may have a small benefit based on the patient's age.  We discussed that the Oncotype test is most useful if chemotherapy is an option that the patient is considering.      Regardless of the decision about chemotherapy, she is a candidate for adjuvant endocrine therapy with an aromatase inhibitor.  We reviewed the potential side effects of anastrozole including hot flashes, vaginal dryness, joint pain, decrease in bone density, and mood or sleep disturbance.     She mentions that she has a trip to [place] planned in early February.  We discussed that if she does receive chemotherapy, this potentially could be ending shortly before her departure.     F/u 3 weeks to review oncotype result.    -10/28/2021 left nipple excision without neoplasm    -11/18/2021 [facility] medical oncology follow-up visit: Has her tissue expanders and beyond the discomfort there is doing well.  Alkaline phosphatase slightly elevated on baseline testing.  Talked about doing bone scanning but for now we will simply repeat the CMP prior to return to my nurse practitioner in 2 months for survivorship visit.  In the meantime we reviewed her Oncotype score which has a low risk Oncotype and no chemotherapy is recommended.  I would give her at least 5 years and preferably 7 of Arimidex.  Because she has had bilateral mastectomies, she needs no radiation.  For PTSD symptoms, we will refer her to [facility] and she will need OB/GYN evaluation which she is going to arrange given her history of ovarian abnormalities as outlined above.  She is going to see pulmonary medicine because of her sarcoidosis.  I think the abnormality of the pleura seen on CT is likely related to her sarcoid and not likely breast metastasis but will get pulmonary opinion.    -1/11/2022 oncology survivorship visit completed    -7/26/2022 [facility] Oncology clinic follow-up: Sina is doing well, she is tolerating Arimidex with no significant side effects.  We will plan on 5-7  years of adjuvant therapy, she began Arimidex 2021.  She will need periodic bone density testing and has not had that yet, I went ahead and ordered it today to get baseline, she states that they have been normal in the past.  She has had her expanders replaced with implants, she plans on further revision in October.  She has no evidence of disease on clinical exam and no new worrisome symptoms.  She continues to follow with ROYB Acevedo for counseling and her anxiety, it is under better control with medication changes, she is now on escitalopram and only takes Xanax rarely.  She is up-to-date on routine gynecological exam.  She did asked today about having her ovaries removed since she has had breast cancer, she thought that her gynecologist mentioned that this is done often times when patients have breast cancer.  I discussed with her that there is no recommendation or indication for routine oophorectomy in the absence of genetic predisposition, we do not just recommend removal of the ovaries when someone has had breast cancer.  She has no family history that would warrant genetic testing.  She does have a history of apparently atypical ovarian cyst that was found at the time of her daughter's birth which was a  22 years ago and was followed closely for quite some time but apparently everything returned negative and she resumed routine follow-up.  She saw pulmonologist recently, Dr. Davila in regards to her history of sarcoidosis and right lower lobe nodule.  She was pleased to find out that the right lower lobe nodule is indicative of benign granuloma and there were no other worrisome findings on CT scan, she will follow with pulmonology annually, no plans for further scanning in the absence of new symptoms.    -2022 DEXA scan shows left femoral neck osteopenia, lowest T-score -1.1 left femoral neck    -11/3/2023 breast revision    -2023 [facility] medical oncology follow-up visit: No  somatic complaints to suggest recurrence.  Asked her to get with primary care for osteopenia therapy of their choosing and would need repeat DEXA scan in a couple of years.  We will have her see my nurse practitioner back for chest wall exam in 6 months.  Continue Arimidex until at least November 2026 and preferably through November 2028 at least.  If tolerating could go as far as November 2031.  In part depends on her bone densities.    -8/21/2024 DEXA bone density scan shows osteopenia of the right femoral neck with T-score -1.5.     Malignant neoplasm of upper-outer quadrant of left breast in female, estrogen receptor positive   10/14/2021 Initial Diagnosis    Malignant neoplasm of upper-outer quadrant of left female breast (HCC)     10/27/2021 Cancer Staged    Staging form: Breast, AJCC 8th Edition  - Pathologic: Stage IA (pT1c, pN0, cM0, G3, ER+, OR+, HER2-) - Signed by Krysten Anguiano MD on 10/27/2021     11/3/2021 -  Other Event    Oncotype score 16 with 4% distant recurrence risk at 9 years on hormone blockade alone with less than 1% adjuvant benefit from chemotherapy.     11/18/2021 -  Hormonal Therapy    Arimidex for a planned 5-7 years     7/28/2022 Imaging    DEXA bone density shows osteopenia of the left femoral neck, lowest T-score -1.1.     8/21/2024 Imaging    DEXA bone density scan shows osteopenia of the right femoral neck with T-score -1.5.         HISTORY OF PRESENT ILLNESS:  The patient is a 63 y.o. female, here for follow up on management of ER positive left breast cancer currently on adjuvant therapy with Arimidex.  Sina overall has been doing well since we saw her last.  She continues to express frustration with inability to lose weight despite walking regularly and eating healthy.  She has no new or concerning bony aches or pains, no new or concerning findings on chest exam.  She is tolerating Arimidex with no unusual side effects.    Past Medical History:   Diagnosis Date    6 mm  "noncalcified right lower lobe nodule 2021    Anxiety     Arthritis     COVID-19 2021    Fracture of foot bone, left, closed     Hx of skin cancer, basal cell     Hypertension     Malignant neoplasm of upper-outer quadrant of left female breast 10/14/2021    Sarcoidosis     10 years ago    Wears reading eyeglasses      Past Surgical History:   Procedure Laterality Date    BREAST ABSCESS INCISION AND DRAINAGE Left 10/28/2021    Procedure: DEBRIDEMENT LEFT BREAST NIPPLE;  Surgeon: Mic Hannah MD;  Location:  [facility];  Service: Plastics;  Laterality: Left;    BREAST RECONSTRUCTION, BREAST TISSUE EXPANDER INSERTION Bilateral 10/14/2021    Procedure: IMMEDIATE BILATERAL BREAST RECONSTRUCTION WITH TISSUE EXPANDERS AND ALLODERM;  Surgeon: Mic Hannah MD;  Location:  [facility];  Service: Plastics;  Laterality: Bilateral;    BREAST RECONSTRUCTION, BREAST TISSUE EXPANDER REMOVAL, IMPLANT INSERTION Bilateral 2022    Procedure: BREAST RECONSTRUCTION, BREAST TISSUE EXPANDER EXCHANGE TO PERMANENT IMPLANTS BILATERAL;  Surgeon: Mic Hannah MD;  Location:  [facility];  Service: Plastics;  Laterality: Bilateral;    BREAST SURGERY Left 10/28/2021    Procedure: COMPLEX CLOSURE LEFT;  Surgeon: Mic Hannah MD;  Location:  [facility];  Service: Plastics;  Laterality: Left;    CARPAL TUNNEL RELEASE Bilateral      SECTION      with ovaraian cyst removed and found \"borderline cells\" in the cyst followed and released 6 years ago    COLONOSCOPY      FAT GRAFTING Bilateral 2022    Procedure: BREAST REVISION WITH FAT GRAFTING BILATERAL;  Surgeon: Mic Hannah MD;  Location:  [facility];  Service: Plastics;  Laterality: Bilateral;    FOOT SURGERY Left     hardware    KNEE CARTILAGE SURGERY Left     LYMPH NODE BIOPSY      2010    MASTECTOMY W/ SENTINEL NODE BIOPSY Bilateral 10/14/2021    Procedure: SKIN SPARING MASTECTOMY, LEFT SENITNEL NODE BIOPSY, RIGHT PROPHYLACTIC SKIN SPARING " "MASTECTOMY;  Surgeon: Sylvester Mc MD;  Location:  [facility];  Service: General;  Laterality: Bilateral;    NIPPLE RECONSTRUCTION Left 11/03/2022    Procedure: NIPPLE RECONSTRUCTION- LEFT;  Surgeon: Mic Hannah MD;  Location: [facility];  Service: Plastics;  Laterality: Left;    ORIF FOOT FRACTURE Left 2021    REPLACEMENT TOTAL KNEE Right 01/2024     w/Dr. Lynch    TOTAL KNEE ARTHROPLASTY Left        No Known Allergies    Family History and Social History reviewed and changed as necessary    REVIEW OF SYSTEM:   No new somatic concerns    PHYSICAL EXAM:  Well-developed, well-nourished healthy appearing female in no distress  No cervical, supraclavicular or axillary nodes palpable on exam  Chest exam benign, skin tissue over bilateral reconstructed breast is soft, no abnormal masses, nodules, rashes or lesions.    Vitals:    08/26/24 1127   BP: 158/76   Pulse: 77   Resp: 18   Temp: 97.6 °F (36.4 °C)   SpO2: 97%   Weight: 97.5 kg (215 lb)   Height: 163.8 cm (64.5\")     Vitals:    08/26/24 1127   PainSc: 0-No pain          ECOG score: 0           Vitals reviewed.    ECOG: (0) Fully Active - Able to Carry On All Pre-disease Performance Without Restriction    Lab Results   Component Value Date    HGB 11.9 01/10/2024    HCT 36.6 01/10/2024    MCV 97 01/10/2024     01/10/2024    WBC 10.83 (H) 01/10/2024       Lab Results   Component Value Date    GLUCOSE 106 (H) 10/27/2022    BUN 18 10/27/2022    CREATININE 0.89 10/27/2022     10/27/2022    K 4.6 10/27/2022     10/27/2022    CO2 28.0 10/27/2022    CALCIUM 9.4 10/27/2022    PROTEINTOT 7.7 11/18/2021    ALBUMIN 4.3 01/02/2024    BILITOT 0.2 11/18/2021    ALKPHOS 126 (H) 11/18/2021    AST 24 11/18/2021    ALT 27 11/18/2021             ASSESSMENT & PLAN:    1.  ER positive left breast cancer, pT1 cN0 M0 currently on adjuvant therapy with Arimidex  2.  Osteopenia    Discussion: Sina overall is doing well.  She has no evidence of recurrent " breast cancer on clinical exam and no new worrisome symptoms.  She is tolerating Arimidex with no significant side effects, we plan on 5-7 years adjuvant therapy, she began arimidex November 2021.  She is up to date on bone density testing, most recent DEXA 8/21/2024 still shows osteopenia, lowest T-score -1.5 in the right femoral neck.  She will continue calcium and vitamin D and walking for exercise.    Return to clinic in 6 months for follow-up    This was a level 3, limited [facility] visit with stable chronic illness and prescription drug management.  Cherrie Claudio, APRN    08/26/2024

## 2024-10-28 ENCOUNTER — OFFICE VISIT (OUTPATIENT)
Dept: PULMONOLOGY | Facility: CLINIC | Age: 64
End: 2024-10-28
Payer: COMMERCIAL

## 2024-10-28 VITALS
SYSTOLIC BLOOD PRESSURE: 156 MMHG | OXYGEN SATURATION: 98 % | HEIGHT: 65 IN | DIASTOLIC BLOOD PRESSURE: 84 MMHG | HEART RATE: 84 BPM | WEIGHT: 209 LBS | BODY MASS INDEX: 34.82 KG/M2

## 2024-10-28 DIAGNOSIS — D86.1 SARCOIDOSIS OF LYMPH NODES: Primary | ICD-10-CM

## 2024-10-28 PROCEDURE — 99213 OFFICE O/P EST LOW 20 MIN: CPT | Performed by: INTERNAL MEDICINE

## 2024-10-28 PROCEDURE — 94010 BREATHING CAPACITY TEST: CPT | Performed by: INTERNAL MEDICINE

## 2024-10-28 PROCEDURE — 94729 DIFFUSING CAPACITY: CPT | Performed by: INTERNAL MEDICINE

## 2024-10-28 PROCEDURE — 94726 PLETHYSMOGRAPHY LUNG VOLUMES: CPT | Performed by: INTERNAL MEDICINE

## 2024-10-28 NOTE — PROGRESS NOTES
"Follow Up Office Note       Patient Name: Sina Greenfield    Referring Physician: No ref. provider found    Chief Complaint:    Chief Complaint   Patient presents with    6 mm noncalcified right lower lobe nodule     2 year follow-up       History of Present Illness: Sina Greenfield is a 64 y.o. female who is here today to follow-up care with Pulmonary.  The patient has a past medical history significant for breast cancer with a T1 N0 M0, that underwent a bilateral mastectomy in October 2021, she also has a history of sarcoidosis and followed at [facility] for roughly 10 years.  She also has a history of sarcoidosis and anxiety.  The patient is doing well.  Denies any chest pain, nausea, fever, or chills.  No breathing complaints.    Review of Systems:   Review of Systems   Constitutional:  Negative for chills, fatigue and fever.   HENT:  Negative for congestion and voice change.    Eyes:  Negative for blurred vision.   Respiratory:  Negative for cough, shortness of breath and wheezing.    Cardiovascular:  Negative for chest pain.   Skin:  Negative for dry skin.   Hematological:  Negative for adenopathy.   Psychiatric/Behavioral:  Negative for agitation and depressed mood.        The following portions of the patient's history were reviewed and updated as appropriate: allergies, current medications, past family history, past medical history, past social history, past surgical history and problem list.    Physical Exam:  Vital Signs:   Vitals:    10/28/24 1053   BP: 156/84   Pulse: 84   SpO2: 98%   Weight: 94.8 kg (209 lb)   Height: 163.8 cm (64.5\")       Physical Exam  Vitals and nursing note reviewed.   Constitutional:       General: She is not in acute distress.     Appearance: She is well-developed and normal weight. She is not ill-appearing or toxic-appearing.   HENT:      Head: Normocephalic and atraumatic.   Cardiovascular:      Rate and Rhythm: Normal rate and regular rhythm.      Pulses: Normal pulses. "      Heart sounds: Normal heart sounds. No murmur heard.     No friction rub. No gallop.   Pulmonary:      Effort: Pulmonary effort is normal. No respiratory distress.      Breath sounds: Normal breath sounds. No wheezing, rhonchi or rales.   Musculoskeletal:      Right lower leg: No edema.      Left lower leg: No edema.   Skin:     General: Skin is warm and dry.   Neurological:      Mental Status: She is alert and oriented to person, place, and time.         Immunization History   Administered Date(s) Administered    COVID-19 (PFIZER) 12YRS+ (COMIRNATY) 10/13/2023, 09/01/2024    COVID-19 (PFIZER) BIVALENT 12+YRS 10/13/2022    COVID-19 (PFIZER) Purple Cap Monovalent 03/17/2021, 04/07/2021, 11/10/2021, 04/22/2022    Flu Vaccine Split Quad 10/04/2021    Flublock Quad =>18yrs 10/06/2020       Results Review:   -I personally viewed the patient's chest x-ray from 10/28/2024 compared to 2021.  There is no acute cardiopulmonary process.  - CT scan of the chest from 4/2022 showed that the 6 mm nodule in the right lower lobe is now calcified, the mediastinal adenopathy is all stable, and all of her other nodules have been present now since 2008, and are not concerning for malignancy.  This is all likely secondary to old granulomatous disease.              - CT of the chest from October 1, 2021 showed a 6 mm nodule in the right lower lobe that was noncalcified with some bilateral mediastinal and hilar lymphadenopathy.  -Personally viewed the patient's chart with regards to recent evaluation by oncology with no signs of recurrence of breast cancer  -I personally viewed the patient's PFTs from 10/28/2024 showed no obstruction or restriction with a normal DLCO.    Assessment / Plan:   Diagnoses and all orders for this visit:    1. Sarcoidosis of lymph nodes (Primary)  -Clinical doses has been stable now for 2 years.  Denies any current complaints.  No medications are required.  Will follow back up in 2 years.    Follow Up:    Return in about 2 years (around 10/28/2026).       CHRISTINE Davila DO  Pulmonary and Critical Care Medicine  Note Electronically Signed    Part of this note may be an electronic transcription/translation of spoken language to printed text using the Dragon Dictation System.

## 2025-02-17 ENCOUNTER — TRANSCRIBE ORDERS (OUTPATIENT)
Dept: PHYSICAL THERAPY | Facility: CLINIC | Age: 65
End: 2025-02-17
Payer: COMMERCIAL

## 2025-02-17 ENCOUNTER — TREATMENT (OUTPATIENT)
Dept: PHYSICAL THERAPY | Facility: CLINIC | Age: 65
End: 2025-02-17
Payer: COMMERCIAL

## 2025-02-17 DIAGNOSIS — M16.12 PRIMARY OSTEOARTHRITIS OF LEFT HIP: Primary | ICD-10-CM

## 2025-02-17 DIAGNOSIS — M25.552 PAIN OF LEFT HIP: ICD-10-CM

## 2025-02-17 DIAGNOSIS — R68.89 ACTIVITY INTOLERANCE: ICD-10-CM

## 2025-02-17 DIAGNOSIS — Z96.642 S/P HIP REPLACEMENT, LEFT: Primary | ICD-10-CM

## 2025-02-17 DIAGNOSIS — R53.1 WEAKNESS: ICD-10-CM

## 2025-02-17 PROCEDURE — 97535 SELF CARE MNGMENT TRAINING: CPT | Performed by: PHYSICAL THERAPIST

## 2025-02-17 PROCEDURE — 97110 THERAPEUTIC EXERCISES: CPT | Performed by: PHYSICAL THERAPIST

## 2025-02-17 PROCEDURE — 97162 PT EVAL MOD COMPLEX 30 MIN: CPT | Performed by: PHYSICAL THERAPIST

## 2025-02-17 NOTE — PROGRESS NOTES
Physical Therapy Initial Evaluation and Plan of Care  [facility] [place] [facility] [place]  [address]  [facility] [address]        Patient: Sina Greenfield   : 1960  Diagnosis/ICD-10 Code:  S/P hip replacement, left [Z96.642]  Referring practitioner: Cherrie Milner NP  Date of Initial Visit: 2025  Today's Date: 2025  Patient seen for 1 sessions         Visit Diagnoses:    ICD-10-CM ICD-9-CM   1. S/P hip replacement, left  Z96.642 V43.64   2. Weakness  R53.1 780.79   3. Pain of left hip  M25.552 719.45   4. Activity intolerance  R68.89 780.99         Subjective Questionnaire: LEFS: 32    Subjective Evaluation    History of Present Illness  Mechanism of injury: The pt underwent an anterior L SEAMUS on 25 performed by Dr. Crocker. PT order specified WBAT and no hip precautions. She is managing pain with gabapentin and tramadol and feels pain is well controlled. She has been trying to stand and walk every hour and typically takes a few laps around the house. She is performing her HEP 3x/day and feels it is going well.  She is ambulating with a front wheeled walker and stated she feels steady.  She has 4 stairs to get in her home and has navigated these well with use of a handrail.     She is going to [place] in May and would like to be able to walk around without pain. Her daughter is graduating from the [employer] in [place] and she will travel with her to [facility] for 2 weeks. She will return to her part time job at the [employer] in April and plans to work 5 hours a day, 1 day per week.    She has had both knees replaced.      Patient Occupation: Part time job -  at the [employer]  Current pain ratin  At worst pain ratin  Location: L hip  Quality: tight  Relieving factors: ice  Aggravating factors: ambulation, prolonged positioning, squatting, lifting, standing and stairs    Social Support  Lives in: multiple-level home    Patient Goals  Patient goals for therapy:  decreased pain, increased motion, improved balance, increased strength, independence with ADLs/IADLs, return to sport/leisure activities and return to work             Objective          Observations     Additional Hip Observation Details  Aquacel in place over incision site.  3 cm diameter fluid accumulation in proximal incision site.  Not directly observed due to postoperative dressing.    Palpation     Additional Palpation Details  Tenderness to palpation in left hip flexors and left proximal quads    Active Range of Motion   Left Hip   Flexion: 90 degrees   Extension: 10 degrees     Right Hip   Flexion: 120 degrees   Extension: 40 degrees     Strength/Myotome Testing     Left Hip   Planes of Motion   Flexion: 3  Extension: 4-  Abduction: 3+  External rotation: 3+    Right Hip   Planes of Motion   Flexion: 5  Extension: 4+  Abduction: 4  External rotation: 4    Left Knee   Flexion: 5  Extension: 5    Right Knee   Flexion: 5  Extension: 5    Left Ankle/Foot   Dorsiflexion: 5  Plantar flexion: 5  Great toe extension: 5    Right Ankle/Foot   Dorsiflexion: 5  Plantar flexion: 5  Great toe extension: 5          Assessment & Plan       Assessment  Impairments: abnormal gait, abnormal muscle firing, abnormal or restricted ROM, activity intolerance, impaired balance, impaired physical strength, lacks appropriate home exercise program and pain with function   Functional limitations: lifting, walking, uncomfortable because of pain, standing, stooping and unable to perform repetitive tasks   Assessment details: The patient is a 63 yo female who presented to PT with evolving characteristics of acute L hip pain with weakness and activity intolerance with moderate complexity, S/P L anterior SEAMUS performed 2/13/25 by Dr. Crocker. Pain is well controlled with medication. She has already begun exercises per physician prescription, and is walking every hour with a FWW. She demonstrated relatively fluid gait today with slightly  decreased weight bearing on the L and limited L hip extension.  She has appropriate muscle activation in all lower extremity muscle without signs of any nerve damage.  She was prescribed an HEP for lower extremity strengthening exercises and should be able to transition to more functional weightbearing strengthening in the near future.  She was educated on use of ice and medication for pain management, the importance of frequent walking and positional change, and general plan of care following hip surgery.  Per physician order she does not have hip precautions. I expect the patient to make a timely recovery with skilled PT intervention.     Prognosis: good    Goals  Plan Goals: Short Term Goals (4 weeks):     1. The patient will be independent and compliant with initial HEP.     2. The patient will report pain at rest 0/10 or less and worst pain 3/10 or less.    3. The patient will display decreased TTP in the anterior hip and thigh and dec mm tension in the surrounding musculature.    4. The patient will demonstrate inc strength evidenced by MMT as follows: hip abd 4/5, hip ext 4/5, hip ER 4/5, and hip flex 4/5.    5. LEFS will improve by 9 points or more.     6. The pt will ambulate 200 feet with no AD and gait pattern free of apparent deviations.        Long Term Goals (8 weeks):     1. The patient will be appropriate for independent management and compliant with progressed HEP.     2. The patient will report pain at rest 0/10 or less and worst pain 0/10 or less.    3. The patient will return to work duties and/or ADLs with no limitations due to hip pain or dysfunction.    4. The patient will return to recreational and community activities with no limitations due to hip pain or dysfunction.      Plan  Therapy options: will be seen for skilled therapy services  Planned modality interventions: cryotherapy, electrical stimulation/Russian stimulation, iontophoresis, TENS and thermotherapy (hydrocollator  packs)  Planned therapy interventions: ADL retraining, body mechanics training, balance/weight-bearing training, flexibility, functional ROM exercises, gait training, home exercise program, joint mobilization, manual therapy, neuromuscular re-education, postural training, soft tissue mobilization, strengthening, stretching, therapeutic activities and transfer training  Frequency: 2x week  Duration in visits: 16  Duration in weeks: 8  Treatment plan discussed with: patient  Plan details: The pt will likely benefit from TE/TA/NMED to improve strength of the hips, quads, and hamstrings, to improve balance, and to restore normal proprioception. MT will be utilized to improve ROM and jt mobility. Modalities will be used as needed for pain modulation.        History # of Personal Factors and/or Comorbidities: MODERATE (1-2)  Examination of Body System(s): # of elements: MODERATE (3)  Clinical Presentation: EVOLVING  Clinical Decision Making: MODERATE    Timed:         Manual Therapy:    0     mins  35597;     Therapeutic Exercise:    15     mins  37410;     Neuromuscular Mary:    0    mins  45347;    Therapeutic Activity:     0     mins  11377;     Gait Trainin     mins  56798;     Ultrasound:     0     mins  81179;    Ionto                               0    mins   62344  Self Care                       8     mins   61702      Un-Timed:  Canalith Repos    0     mins 32293  Group Therapy    0     mins 07744  Electrical Stimulation:    0     mins  53185 (MC );  Dry Needling     0     mins self-pay  Traction     0     mins 35173  Low Eval     0     Mins  29381  Mod Eval     27     Mins  52983  High Eval                       0     Mins  90632        Timed Treatment:   23   mins   Total Treatment:     50   mins          PT: Pete Rodrigez PT     License Number: [id number]  Electronically signed by Pete Rodrigez PT, 25, 1:05 PM EST    Initial Certification  Certification Period: 2025  I certify that the  therapy services are furnished while this patient is under my care.  The services outlined above are required by this patient, and will be reviewed every 90 days.     PHYSICIAN: ________________________________________________________  Cherrie Milner, IVAN        DATE: ____________________________________________________________    Please sign and return via fax to [phone]. Thank you, [facility].

## 2025-02-20 ENCOUNTER — TREATMENT (OUTPATIENT)
Dept: PHYSICAL THERAPY | Facility: CLINIC | Age: 65
End: 2025-02-20
Payer: COMMERCIAL

## 2025-02-20 DIAGNOSIS — M25.552 PAIN OF LEFT HIP: ICD-10-CM

## 2025-02-20 DIAGNOSIS — R68.89 ACTIVITY INTOLERANCE: ICD-10-CM

## 2025-02-20 DIAGNOSIS — Z96.642 S/P HIP REPLACEMENT, LEFT: Primary | ICD-10-CM

## 2025-02-20 DIAGNOSIS — R53.1 WEAKNESS: ICD-10-CM

## 2025-02-20 NOTE — PROGRESS NOTES
Physical Therapy Daily Treatment Note  [facility] [place]  [address], [address]  [place] [address]      Patient: Sina Greenfield   : 1960  Referring practitioner: No ref. provider found  Date of Initial Visit: Type: THERAPY  Noted: 2025  Today's Date: 2025  Patient seen for 2 sessions       Visit Diagnoses:    ICD-10-CM ICD-9-CM   1. S/P hip replacement, left  Z96.642 V43.64   2. Weakness  R53.1 780.79   3. Pain of left hip  M25.552 719.45   4. Activity intolerance  R68.89 780.99       Subjective Evaluation    History of Present Illness  Mechanism of injury: The pt has largely weaned from her walker and is using a cane sparingly as needed for balance. She keeps the walker by her bed to use at night.  She has had some soreness as a result of exercise but otherwise stated pain is mild.  She had to return to her orthopedic office to have her Aquacel replaced after it began to peel away from the skin and was told her incision looked closed and clean.    Pain  Current pain ratin  Location: L hip         Objective   See Exercise, Manual, and Modality Logs for complete treatment.       Assessment & Plan       Assessment  Assessment details: The patient continues to see reduced pain and improved function following the anterior left hip replacement.  She has seen a rapid improvement in symptoms since surgery.  She is weaned from her front wheeled walker and is now using a cane.  She looks stable ambulating without any assistive device today and was encouraged to begin community walking this weekend, keeping her cane nearby in the event that she fatigues quickly.  Progressive lower extremity strengthening exercises were continued today and were tolerated well without significant complaints of pain, though she lacks power and endurance in the glutes and quads.  This is expected to improve with consistent exercise and as swelling decreases.  She is interested in joining a gym and I feel  this is appropriate at any time so that she is able to perform some additional independent strengthening.    Plan  Plan details: Continue strengthening of the lower extremities and core muscles with functional activities.        Timed:         Manual Therapy:    0     mins  38682;     Therapeutic Exercise:    40     mins  87301;     Neuromuscular Mary:    10    mins  59552;    Therapeutic Activity:     8     mins  65452;     Gait Trainin     mins  53393;     Ultrasound:     0     mins  25521;    Ionto                               0    mins   17638  Self Care                       0     mins   24850      Un-Timed:  Canalith Repos    0     mins 38451  Group Therapy    0     mins 21948  Electrical Stimulation:    0     mins  90751 ( );  Dry Needling     0     mins self-pay  Traction     0     mins 78753      Timed Treatment:   58   mins   Total Treatment:     58   mins    TOM Granados License: [id number]

## 2025-02-24 ENCOUNTER — TREATMENT (OUTPATIENT)
Dept: PHYSICAL THERAPY | Facility: CLINIC | Age: 65
End: 2025-02-24
Payer: COMMERCIAL

## 2025-02-24 DIAGNOSIS — R68.89 ACTIVITY INTOLERANCE: ICD-10-CM

## 2025-02-24 DIAGNOSIS — Z96.642 S/P HIP REPLACEMENT, LEFT: Primary | ICD-10-CM

## 2025-02-24 DIAGNOSIS — M25.552 PAIN OF LEFT HIP: ICD-10-CM

## 2025-02-24 DIAGNOSIS — R53.1 WEAKNESS: ICD-10-CM

## 2025-02-24 NOTE — PROGRESS NOTES
Physical Therapy Daily Treatment Note  [facility] [place]  [address], [address]  [place] [address]      Patient: Sina Greenfield   : 1960  Referring practitioner: No ref. provider found  Date of Initial Visit: Type: THERAPY  Noted: 2025  Today's Date: 2025  Patient seen for 3 sessions       Visit Diagnoses:    ICD-10-CM ICD-9-CM   1. S/P hip replacement, left  Z96.642 V43.64   2. Weakness  R53.1 780.79   3. Activity intolerance  R68.89 780.99   4. Pain of left hip  M25.552 719.45       Subjective Evaluation    History of Present Illness  Mechanism of injury: The patient stated that she was able to walk around [place] this weekend without limitation.  She was able to navigate a flight of stairs better than she has in years.  She feels that her strength is already improving and noted she was able to march with less difficulty over the weekend.    Pain  Current pain ratin  Location: L hip         Objective   See Exercise, Manual, and Modality Logs for complete treatment.       Assessment & Plan       Assessment  Assessment details: The patient was able to navigate a flight of stairs this week without significant limitation.  She was encouraged to continue using this as a strength .  Additionally, because she is doing so well she will benefit from a gradual return to normal everyday activity as well as progression of her walking program.  She tolerated strengthening exercises very well in the clinic today without substantial fatigue, but I do anticipate soreness in the next day or 2.    Plan  Plan details: Continue strengthening of the core and proximal lower extremities.        Timed:         Manual Therapy:    0     mins  42581;     Therapeutic Exercise:    40     mins  13223;     Neuromuscular Mary:    8    mins  42886;    Therapeutic Activity:     12     mins  84702;     Gait Trainin     mins  35183;     Ultrasound:     0     mins  14710;    Ionto                                0    mins   73516  Self Care                       0     mins   11650      Un-Timed:  Canalith Repos    0     mins 35183  Group Therapy    0     mins 54996  Electrical Stimulation:    0     mins  32705 ( );  Dry Needling     0     mins self-pay  Traction     0     mins 96377      Timed Treatment:   60   mins   Total Treatment:     60   mins    Pete Rodrigez, PT  [place] License: [id number]

## 2025-02-27 ENCOUNTER — TREATMENT (OUTPATIENT)
Dept: PHYSICAL THERAPY | Facility: CLINIC | Age: 65
End: 2025-02-27
Payer: COMMERCIAL

## 2025-02-27 DIAGNOSIS — R68.89 ACTIVITY INTOLERANCE: ICD-10-CM

## 2025-02-27 DIAGNOSIS — R53.1 WEAKNESS: ICD-10-CM

## 2025-02-27 DIAGNOSIS — Z96.642 S/P HIP REPLACEMENT, LEFT: Primary | ICD-10-CM

## 2025-02-27 DIAGNOSIS — M25.552 PAIN OF LEFT HIP: ICD-10-CM

## 2025-02-27 PROCEDURE — 97110 THERAPEUTIC EXERCISES: CPT | Performed by: PHYSICAL THERAPIST

## 2025-02-27 PROCEDURE — 97112 NEUROMUSCULAR REEDUCATION: CPT | Performed by: PHYSICAL THERAPIST

## 2025-02-27 PROCEDURE — 97530 THERAPEUTIC ACTIVITIES: CPT | Performed by: PHYSICAL THERAPIST

## 2025-02-27 NOTE — PROGRESS NOTES
Physical Therapy Daily Treatment Note  [facility] [place] [place]  [address], [address]  [place] [address]      Patient: Sina Greenfield   : 1960  Referring practitioner: Cherrie Milner NP  Date of Initial Visit: Type: THERAPY  Noted: 2025  Today's Date: 2025  Patient seen for 4 sessions       Visit Diagnoses:    ICD-10-CM ICD-9-CM   1. S/P hip replacement, left  Z96.642 V43.64   2. Weakness  R53.1 780.79   3. Activity intolerance  R68.89 780.99   4. Pain of left hip  M25.552 719.45       Subjective Evaluation    History of Present Illness  Mechanism of injury: The patient stated that she was sore following her last visit but denied any increase in pain.  She continues to experience some numbness in the posterolateral hip.  She was able to take a 15-minute walk through the neighborhood but stated she moved at a slow pace.  She will be seeing her orthopedic surgeon tomorrow.    Pain  Current pain ratin  Location: L hip         Objective   See Exercise, Manual, and Modality Logs for complete treatment.       Assessment & Plan       Assessment  Assessment details: The patient continues to respond very well to PT interventions postoperatively and has made a rapid recovery in regards to pain and function.  She was able to walk through her neighborhood at a slow pace yesterday and was encouraged to continue outdoor walking, as she hopes to walk long distances in [place] in a couple of months.  Strength is coming along quickly and she demonstrated much better glued activation today.  Her home program was progressed accordingly so that she can continue building functional strength.  She will benefit from reduced PT frequency and increased independence.    Plan  Plan details: Reduced PT frequency to 1 time per week and continue strengthening exercises as tolerated      Timed:         Manual Therapy:    0     mins  50672;     Therapeutic Exercise:    40     mins  66850;     Neuromuscular Mary:     10    mins  86376;    Therapeutic Activity:     10     mins  19173;     Gait Trainin     mins  68704;     Ultrasound:     0     mins  93931;    Ionto                               0    mins   81426  Self Care                       0     mins   46952      Un-Timed:  Canalith Repos    0     mins 57342  Group Therapy    0     mins 88245  Electrical Stimulation:    0     mins  64793 ( );  Dry Needling     0     mins self-pay  Traction     0     mins 21240      Timed Treatment:   60   mins   Total Treatment:     60   mins    Pete Rodrigez, PT  [place] License: [id number]

## 2025-02-28 ENCOUNTER — OFFICE VISIT (OUTPATIENT)
Dept: ONCOLOGY | Facility: CLINIC | Age: 65
End: 2025-02-28
Payer: COMMERCIAL

## 2025-02-28 VITALS
WEIGHT: 220 LBS | HEART RATE: 90 BPM | HEIGHT: 64 IN | DIASTOLIC BLOOD PRESSURE: 82 MMHG | TEMPERATURE: 97.7 F | BODY MASS INDEX: 37.56 KG/M2 | OXYGEN SATURATION: 99 % | SYSTOLIC BLOOD PRESSURE: 181 MMHG

## 2025-02-28 DIAGNOSIS — Z17.0 MALIGNANT NEOPLASM OF UPPER-OUTER QUADRANT OF LEFT BREAST IN FEMALE, ESTROGEN RECEPTOR POSITIVE: Primary | ICD-10-CM

## 2025-02-28 DIAGNOSIS — C50.412 MALIGNANT NEOPLASM OF UPPER-OUTER QUADRANT OF LEFT BREAST IN FEMALE, ESTROGEN RECEPTOR POSITIVE: Primary | ICD-10-CM

## 2025-02-28 PROCEDURE — 99214 OFFICE O/P EST MOD 30 MIN: CPT | Performed by: NURSE PRACTITIONER

## 2025-02-28 NOTE — PROGRESS NOTES
CHIEF COMPLAINT: Difficulty with weight loss    Problem List:  Oncology/Hematology History Overview Note   1. [id number] ER+ MI+ Her2 negative invasive ductal carcinoma of the left breast with low risk Oncotype score 16.  Began adjuvant Arimidex 11/2021  A) bilateral mastectomy on 10/14/21.  Pathology showed a 1.5 cm high grade IDC.  0/2 SLN involved.  -Begin Arimidex November 2021 for a planned 5-7 years  2. Hypertension  3. Sarcoidosis diagnosed on mediastinoscopy never required treatment.  4. Anxiety  5. History of atypical cyst found in ovarian cyst shortly after birth of her daughter in 1999 followed with serial exams with no recurrence, now back to routine gynecology exams.  6. 2/13/2025 left hip replacement    Oncology history timeline:  -10/1/2021 CT chestShow 6 mm pleural-based nodule right lung base for which continued follow-up in 6 months is recommended.  Dr. Schmid states that the enlargement of the lymph nodes within the hilar regions and mediastinum can easily be related to in part of her known sarcoidosis.  PET canceled  -10/14/2021  Right breast prophylactic skin sparing mastectomy benign.  Left breast skin sparing nipple sparing mastectomy invasive ductal 1.5 cm carcinoma Epi grade 3 wide margins.  0 out of 2 left sentinel nodes involved.  [place] 8 out of 9.  No lymph vascular invasion.  ER 95% 3+  MI 1-5% 3+  HER-2/will 5% 1+  Oncotype score 16.  4% distant recurrence risk at 9 years on hormone blockade alone.  Less than 1% adjuvant chemotherapeutic benefit.    -10/27/2021 consultation Dr. Krysten Anguiano: She discussedthe use of the Oncotype recurrence score.  This test provides information about the biology and risk of recurrence for ER positive Her2-negative breast cancers.  It is clear from previous studies that patients who have a low risk Oncotype do not benefit from adjuvant chemotherapy.  Conversely, patients with a high risk Oncotype can have a significant benefit from  adjuvant chemotherapy.  Scores in the intermediate risk group may have a small benefit based on the patient's age.  We discussed that the Oncotype test is most useful if chemotherapy is an option that the patient is considering.      Regardless of the decision about chemotherapy, she is a candidate for adjuvant endocrine therapy with an aromatase inhibitor.  We reviewed the potential side effects of anastrozole including hot flashes, vaginal dryness, joint pain, decrease in bone density, and mood or sleep disturbance.     She mentions that she has a trip to [place] planned in early February.  We discussed that if she does receive chemotherapy, this potentially could be ending shortly before her departure.     F/u 3 weeks to review oncotype result.    -10/28/2021 left nipple excision without neoplasm    -11/18/2021 [facility] medical oncology follow-up visit: Has her tissue expanders and beyond the discomfort there is doing well.  Alkaline phosphatase slightly elevated on baseline testing.  Talked about doing bone scanning but for now we will simply repeat the CMP prior to return to my nurse practitioner in 2 months for survivorship visit.  In the meantime we reviewed her Oncotype score which has a low risk Oncotype and no chemotherapy is recommended.  I would give her at least 5 years and preferably 7 of Arimidex.  Because she has had bilateral mastectomies, she needs no radiation.  For PTSD symptoms, we will refer her to [facility] and she will need OB/GYN evaluation which she is going to arrange given her history of ovarian abnormalities as outlined above.  She is going to see pulmonary medicine because of her sarcoidosis.  I think the abnormality of the pleura seen on CT is likely related to her sarcoid and not likely breast metastasis but will get pulmonary opinion.    -1/11/2022 oncology survivorship visit completed    -7/26/2022 [facility] Oncology clinic follow-up: Sina is doing well, she is tolerating Arimidex  with no significant side effects.  We will plan on 5-7 years of adjuvant therapy, she began Arimidex 2021.  She will need periodic bone density testing and has not had that yet, I went ahead and ordered it today to get baseline, she states that they have been normal in the past.  She has had her expanders replaced with implants, she plans on further revision in October.  She has no evidence of disease on clinical exam and no new worrisome symptoms.  She continues to follow with ROBY Acevedo for counseling and her anxiety, it is under better control with medication changes, she is now on escitalopram and only takes Xanax rarely.  She is up-to-date on routine gynecological exam.  She did asked today about having her ovaries removed since she has had breast cancer, she thought that her gynecologist mentioned that this is done often times when patients have breast cancer.  I discussed with her that there is no recommendation or indication for routine oophorectomy in the absence of genetic predisposition, we do not just recommend removal of the ovaries when someone has had breast cancer.  She has no family history that would warrant genetic testing.  She does have a history of apparently atypical ovarian cyst that was found at the time of her daughter's birth which was a  22 years ago and was followed closely for quite some time but apparently everything returned negative and she resumed routine follow-up.  She saw pulmonologist recently, Dr. Davila in regards to her history of sarcoidosis and right lower lobe nodule.  She was pleased to find out that the right lower lobe nodule is indicative of benign granuloma and there were no other worrisome findings on CT scan, she will follow with pulmonology annually, no plans for further scanning in the absence of new symptoms.    -2022 DEXA scan shows left femoral neck osteopenia, lowest T-score -1.1 left femoral neck    -11/3/2023 breast  revision    -1/26/2023 [facility] medical oncology follow-up visit: No somatic complaints to suggest recurrence.  Asked her to get with primary care for osteopenia therapy of their choosing and would need repeat DEXA scan in a couple of years.  We will have her see my nurse practitioner back for chest wall exam in 6 months.  Continue Arimidex until at least November 2026 and preferably through November 2028 at least.  If tolerating could go as far as November 2031.  In part depends on her bone densities.    -8/21/2024 DEXA bone density scan shows osteopenia of the right femoral neck with T-score -1.5.     Malignant neoplasm of upper-outer quadrant of left breast in female, estrogen receptor positive   10/14/2021 Initial Diagnosis    Malignant neoplasm of upper-outer quadrant of left female breast (HCC)     10/27/2021 Cancer Staged    Staging form: Breast, AJCC 8th Edition  - Pathologic: Stage IA (pT1c, pN0, cM0, G3, ER+, NM+, HER2-) - Signed by Krysten Anguiano MD on 10/27/2021     11/3/2021 -  Other Event    Oncotype score 16 with 4% distant recurrence risk at 9 years on hormone blockade alone with less than 1% adjuvant benefit from chemotherapy.     11/18/2021 -  Hormonal Therapy    Arimidex for a planned 5-7 years     7/28/2022 Imaging    DEXA bone density shows osteopenia of the left femoral neck, lowest T-score -1.1.     8/21/2024 Imaging    DEXA bone density scan shows osteopenia of the right femoral neck with T-score -1.5.         HISTORY OF PRESENT ILLNESS:  The patient is a 64 y.o. female, here for follow up on management of hormone receptor positive left breast cancer currently on adjuvant therapy with Arimidex.  Since we saw Sina last she had left hip replacement about 2 weeks ago.  She had no complications.  She states that it has made a significant difference in her quality of life already.  She is having much less pain.  She is working with physical therapy.  She is hoping to be able to get back to regular  walking program and exercise as she still struggles with difficulty in losing weight.  She is tolerating Arimidex otherwise quite well.  She reports that on 3 occasions last year she had what sounds like syncopal episodes she states that she has had these at various times throughout her life but it seems like last year they happen more often.  Has not seen cardiology.  She did have surgery clearance and an EKG prior to hip replacement.  She has no new or concerning bony aches or pains, no new or concerning findings on chest exam.      Past Medical History:   Diagnosis Date    6 mm noncalcified right lower lobe nodule 2021    Anxiety     Arthritis     COVID-19 2021    Fracture of foot bone, left, closed     Hx of skin cancer, basal cell     Hypertension     Malignant neoplasm of upper-outer quadrant of left female breast 10/14/2021    Sarcoidosis     10 years ago    Wears reading eyeglasses      Past Surgical History:   Procedure Laterality Date    BREAST ABSCESS INCISION AND DRAINAGE Left 10/28/2021    Procedure: DEBRIDEMENT LEFT BREAST NIPPLE;  Surgeon: Mic Hannah MD;  Location:  [facility];  Service: Plastics;  Laterality: Left;    BREAST RECONSTRUCTION, BREAST TISSUE EXPANDER INSERTION Bilateral 10/14/2021    Procedure: IMMEDIATE BILATERAL BREAST RECONSTRUCTION WITH TISSUE EXPANDERS AND ALLODERM;  Surgeon: Mic Hannah MD;  Location:  [facility];  Service: Plastics;  Laterality: Bilateral;    BREAST RECONSTRUCTION, BREAST TISSUE EXPANDER REMOVAL, IMPLANT INSERTION Bilateral 2022    Procedure: BREAST RECONSTRUCTION, BREAST TISSUE EXPANDER EXCHANGE TO PERMANENT IMPLANTS BILATERAL;  Surgeon: Mic Hannah MD;  Location:  [facility];  Service: Plastics;  Laterality: Bilateral;    BREAST SURGERY Left 10/28/2021    Procedure: COMPLEX CLOSURE LEFT;  Surgeon: Mic Hannah MD;  Location:  [facility];  Service: Plastics;  Laterality: Left;    CARPAL TUNNEL RELEASE Bilateral       "SECTION      with ovaraian cyst removed and found \"borderline cells\" in the cyst followed and released 6 years ago    COLONOSCOPY      FAT GRAFTING Bilateral 11/03/2022    Procedure: BREAST REVISION WITH FAT GRAFTING BILATERAL;  Surgeon: Mic Hannah MD;  Location: [facility];  Service: Plastics;  Laterality: Bilateral;    FOOT SURGERY Left     hardware    KNEE CARTILAGE SURGERY Left     LYMPH NODE BIOPSY      2010    MASTECTOMY W/ SENTINEL NODE BIOPSY Bilateral 10/14/2021    Procedure: SKIN SPARING MASTECTOMY, LEFT SENITNEL NODE BIOPSY, RIGHT PROPHYLACTIC SKIN SPARING MASTECTOMY;  Surgeon: Sylvester Mc MD;  Location:  [facility];  Service: General;  Laterality: Bilateral;    NIPPLE RECONSTRUCTION Left 11/03/2022    Procedure: NIPPLE RECONSTRUCTION- LEFT;  Surgeon: Mic Hannah MD;  Location:  [facility];  Service: Plastics;  Laterality: Left;    ORIF FOOT FRACTURE Left 2021    REPLACEMENT TOTAL KNEE Right 01/2024     w/Dr. Lynch    TOTAL HIP ARTHROPLASTY Left 02/13/2025    TOTAL KNEE ARTHROPLASTY Left        No Known Allergies    Family History and Social History reviewed and changed as necessary    REVIEW OF SYSTEM:   Somatic concerns    PHYSICAL EXAM:  Well-developed, well-nourished healthy appearing female in no distress.  BMI 37.2  Lungs clear to auscultation bilaterally, respirations regular and unlabored  Heart regular rate and rhythm  No cervical, supraclavicular or axillary nodes palpable on exam  Skin tissue over bilateral reconstructed breast is soft, no abnormal masses, nodules, rashes or lesions  Ambulates without assistance, gait is normal.    Vitals:    02/28/25 1110   BP: (!) 181/82   Pulse: 90   Temp: 97.7 °F (36.5 °C)   TempSrc: Infrared   SpO2: 99%   Weight: 99.8 kg (220 lb)   Height: 163.8 cm (64.49\")     Vitals:    02/28/25 1110   PainSc: 0-No pain          ECOG score: 1           Vitals reviewed.  Reviewed records from recent left hip replacement and labs available in " epic      ECOG: (1) Restricted in Physically Strenuous Activity, Ambulatory & Able to Do Work of Light Nature    Lab Results   Component Value Date    HGB 9.2 (L) 02/14/2025    HCT 28.9 (L) 02/14/2025    MCV 96 02/14/2025     02/14/2025    WBC 11.32 (H) 02/14/2025       Lab Results   Component Value Date    GLUCOSE 106 (H) 10/27/2022    BUN 18 10/27/2022    CREATININE 0.89 10/27/2022     10/27/2022    K 4.6 10/27/2022     10/27/2022    CO2 28.0 10/27/2022    CALCIUM 9.4 10/27/2022    PROTEINTOT 7.7 11/18/2021    ALBUMIN 4.2 01/27/2025    BILITOT 0.2 11/18/2021    ALKPHOS 126 (H) 11/18/2021    AST 24 11/18/2021    ALT 27 11/18/2021             ASSESSMENT & PLAN:    1.  Hormone receptor positive left breast cancer  2.  Osteopenia  3.  Left hip replacement 2/13/2025  4.  Hypertension  5.  History of syncopal episodes  6.  Anxiety  7.  Weight gain    Discussion: Sina overall is doing well, she has no evidence of recurrent breast cancer on clinical exam and no new worrisome symptoms.  She is tolerating Arimidex for the most part.  She has been finding it since being on Arimidex difficult to lose weight.  She had her left hip replaced just a few weeks ago and is progressing nicely in her physical activity and having much less pain.  She is hoping now to get back to a more regular exercise program and walking regularly.  This will also help with her osteopenia.  We did discuss today that I would think about doing BCI testing as she is approaching 5 years of adjuvant therapy to see if she will benefit from extended therapy as she is leaning towards wanting to complete 5 years if there is no benefit.  She will follow-up with her primary care provider, Dr. Jael Jeffrey regarding concerns over what sounds like syncopal episodes this past year on 3 different occasions.  She will be due for upcoming wellness check and labs are typically repeated at that time.  We did review her CBC available in epic which  has been normal up until just postop where she was mildly anemic and we discussed that this would not be unusual after surgery but should recover and that can be checked with her next routine lab draw.  She will be due for repeat bone density scan August 2026.    Return to clinic in 6 months for follow-up    Cherrie Claudio, APRN    02/28/2025

## 2025-03-03 DIAGNOSIS — Z17.0 MALIGNANT NEOPLASM OF UPPER-OUTER QUADRANT OF LEFT BREAST IN FEMALE, ESTROGEN RECEPTOR POSITIVE: ICD-10-CM

## 2025-03-03 DIAGNOSIS — C50.412 MALIGNANT NEOPLASM OF UPPER-OUTER QUADRANT OF LEFT BREAST IN FEMALE, ESTROGEN RECEPTOR POSITIVE: ICD-10-CM

## 2025-03-03 RX ORDER — ANASTROZOLE 1 MG/1
1 TABLET ORAL DAILY
Qty: 90 TABLET | Refills: 3 | Status: SHIPPED | OUTPATIENT
Start: 2025-03-03

## 2025-03-03 NOTE — TELEPHONE ENCOUNTER
Caller: Sina Greenfield    Relationship: Self    Best call back number: [phone]     Requested Prescriptions:   Requested Prescriptions     Pending Prescriptions Disp Refills    anastrozole (ARIMIDEX) 1 MG tablet 90 tablet 3     Sig: Take 1 tablet by mouth Daily.        Pharmacy where request should be sent: [facility] [place] [id number] - [phone]  - [phone] FX     Last office visit with prescribing clinician: 2/28/2025   Last telemedicine visit with prescribing clinician: Visit date not found   Next office visit with prescribing clinician: 8/29/2025       Does the patient have less than a 3 day supply:  [x] Yes  [] No    Would you like a call back once the refill request has been completed: [x] Yes [] No    If the office needs to give you a call back, can they leave a voicemail: [x] Yes [] No    Onofre Palacio Rep   03/03/25 11:18 EST

## 2025-03-06 ENCOUNTER — TREATMENT (OUTPATIENT)
Dept: PHYSICAL THERAPY | Facility: CLINIC | Age: 65
End: 2025-03-06
Payer: COMMERCIAL

## 2025-03-06 DIAGNOSIS — Z96.642 S/P HIP REPLACEMENT, LEFT: Primary | ICD-10-CM

## 2025-03-06 DIAGNOSIS — R68.89 ACTIVITY INTOLERANCE: ICD-10-CM

## 2025-03-06 DIAGNOSIS — R53.1 WEAKNESS: ICD-10-CM

## 2025-03-06 DIAGNOSIS — M25.552 PAIN OF LEFT HIP: ICD-10-CM

## 2025-03-06 PROCEDURE — 97112 NEUROMUSCULAR REEDUCATION: CPT | Performed by: PHYSICAL THERAPIST

## 2025-03-06 PROCEDURE — 97530 THERAPEUTIC ACTIVITIES: CPT | Performed by: PHYSICAL THERAPIST

## 2025-03-06 PROCEDURE — 97110 THERAPEUTIC EXERCISES: CPT | Performed by: PHYSICAL THERAPIST

## 2025-03-06 NOTE — PROGRESS NOTES
Physical Therapy Daily Treatment Note  [facility] [place]  [address], [address]  [place] [address]      Patient: Sina Greenfield   : 1960  Referring practitioner: Cherrie Milner NP  Date of Initial Visit: Type: THERAPY  Noted: 2025  Today's Date: 3/6/2025  Patient seen for 5 sessions       Visit Diagnoses:    ICD-10-CM ICD-9-CM   1. S/P hip replacement, left  Z96.642 V43.64   2. Weakness  R53.1 780.79   3. Activity intolerance  R68.89 780.99   4. Pain of left hip  M25.552 719.45       Subjective Evaluation    History of Present Illness  Mechanism of injury: The patient returned to driving last week and had 1 episode of a temporary pain along the incision site with prolonged sitting on Tuesday.  She has tolerated driving well otherwise.  She has been able to use stairs and get up and down from the ground without significant limitation.  Her home program is going well and she plans to join a gym in the next week.    Pain  Current pain ratin  Location: L hip           Objective   See Exercise, Manual, and Modality Logs for complete treatment.       Assessment & Plan       Assessment  Assessment details: The patient continues to tolerate exercise progressions very well and is seeing visit to visit improvement in activity tolerance.  She is having intermittent discomfort in the hip but I expect this will continue to some extent for the next couple of months as part of the healing process.  It is not occurring with any particular position or activity and is not concerning to me at this time.  Stabilization and proprioception exercises for the left lower extremity were introduced today and were tolerated well, despite more rapid onset of fatigue.  Her home program was progressed to include an unsupported hip abduction and a high rep hip abduction from mid to endrange.  She was instructed to join a gym this week so that her home program can be moved to a resistive training program.   Anticipate discharge next week.    Plan  Plan details: Continue strengthening stabilization exercises.  Progress home program for gym and consider discharge.          Timed:         Manual Therapy:    0     mins  33020;     Therapeutic Exercise:    23     mins  34776;     Neuromuscular Mary:    25    mins  73251;    Therapeutic Activity:     10     mins  00404;     Gait Trainin     mins  77927;     Ultrasound:     0     mins  72661;    Ionto                               0    mins   20657  Self Care                       0     mins   60776      Un-Timed:  Canalith Repos    0     mins 77333  Group Therapy    0     mins 54085  Electrical Stimulation:    0     mins  49230 ( );  Dry Needling     0     mins self-pay  Traction     0     mins 30696      Timed Treatment:   58   mins   Total Treatment:     58   mins    Pete Rodrigez PT  [place] License: [id number]

## 2025-03-13 ENCOUNTER — TREATMENT (OUTPATIENT)
Dept: PHYSICAL THERAPY | Facility: CLINIC | Age: 65
End: 2025-03-13
Payer: COMMERCIAL

## 2025-03-13 DIAGNOSIS — Z96.642 S/P HIP REPLACEMENT, LEFT: Primary | ICD-10-CM

## 2025-03-13 DIAGNOSIS — R68.89 ACTIVITY INTOLERANCE: ICD-10-CM

## 2025-03-13 DIAGNOSIS — R53.1 WEAKNESS: ICD-10-CM

## 2025-03-13 DIAGNOSIS — M25.552 PAIN OF LEFT HIP: ICD-10-CM

## 2025-03-13 PROCEDURE — 97535 SELF CARE MNGMENT TRAINING: CPT | Performed by: PHYSICAL THERAPIST

## 2025-03-13 PROCEDURE — 97112 NEUROMUSCULAR REEDUCATION: CPT | Performed by: PHYSICAL THERAPIST

## 2025-03-13 PROCEDURE — 97110 THERAPEUTIC EXERCISES: CPT | Performed by: PHYSICAL THERAPIST

## 2025-03-13 NOTE — PROGRESS NOTES
Physical Therapy Daily Treatment Note  [facility] [place]  [address], [facility] [address]  [place] [address]      Patient: Sina Greenfield   : 1960  Referring practitioner: Cherrie Milner NP  Date of Initial Visit: Type: THERAPY  Noted: 2025  Today's Date: 3/13/2025  Patient seen for 6 sessions       Visit Diagnoses:    ICD-10-CM ICD-9-CM   1. S/P hip replacement, left  Z96.642 V43.64   2. Weakness  R53.1 780.79   3. Activity intolerance  R68.89 780.99   4. Pain of left hip  M25.552 719.45       Subjective Evaluation    History of Present Illness  Mechanism of injury: The patient stated that her hip has been feeling very good.  She was able to walk 30 minutes outside without significant limitation including numerous elevation changes.  She joined [employer] and went to the gym 2 times last week.  She would like to discuss exercises that are beneficial to perform at the gym.  Overall she is pleased with her progress and feels she is appropriate for discharge today.    Pain  Current pain ratin  Location: L hip         Objective   See Exercise, Manual, and Modality Logs for complete treatment.       Assessment & Plan       Assessment  Assessment details: The patient has made a rapid recovery from total hip arthroplasty, returning to most home and community activities in the past 4 weeks.  She was able to walk outside without significant restriction or fatigue for 30 minutes this week and also joined a gym.  Active range of motion of the left hip was at 115 degrees into flexion without pain or significant effort to achieve end range.  Hip flexor strength is coming along nicely and no longer limits her ability to transition to and from her bed, car, or low seats.  She is doing well with an independent home exercise program, which was reviewed and progressed today to include a number of exercises that she can perform at home and at the gym.  Her primary goal is to tolerate prolonged walking  on a trip to [place] this summer so she was instructed to focus on high repetition strengthening exercise and long duration walks.  Overall she is doing very well and is appropriate for discharge.    Plan  Plan details: Pt to be d/c.      Timed:         Manual Therapy:    0     mins  87652;     Therapeutic Exercise:    30     mins  31445;     Neuromuscular Mary:    10    mins  94379;    Therapeutic Activity:     0     mins  87131;     Gait Trainin     mins  90225;     Ultrasound:     0     mins  27571;    Ionto                               0    mins   74256  Self Care                       10     mins   73706      Un-Timed:  Canalith Repos    0     mins 91583  Group Therapy    0     mins 99154  Electrical Stimulation:    0     mins  76263 ( );  Dry Needling     0     mins self-pay  Traction     0     mins 11747      Timed Treatment:   50   mins   Total Treatment:     50   mins    TOM Granados License: [id number]

## 2025-05-05 ENCOUNTER — HOSPITAL ENCOUNTER (OUTPATIENT)
Dept: GENERAL RADIOLOGY | Facility: HOSPITAL | Age: 65
Discharge: HOME | End: 2025-05-05
Admitting: INTERNAL MEDICINE
Payer: COMMERCIAL

## 2025-05-05 ENCOUNTER — TRANSCRIBE ORDERS (OUTPATIENT)
Dept: GENERAL RADIOLOGY | Facility: HOSPITAL | Age: 65
End: 2025-05-05
Payer: COMMERCIAL

## 2025-05-05 DIAGNOSIS — M79.632 LEFT FOREARM PAIN: Primary | ICD-10-CM

## 2025-05-05 DIAGNOSIS — M79.632 LEFT FOREARM PAIN: ICD-10-CM

## 2025-05-05 PROCEDURE — 73090 X-RAY EXAM OF FOREARM: CPT

## 2025-05-05 PROCEDURE — 73080 X-RAY EXAM OF ELBOW: CPT

## 2025-05-12 ENCOUNTER — TRANSCRIBE ORDERS (OUTPATIENT)
Dept: PHYSICAL THERAPY | Facility: CLINIC | Age: 65
End: 2025-05-12
Payer: COMMERCIAL

## 2025-05-12 DIAGNOSIS — M79.632 LEFT FOREARM PAIN: Primary | ICD-10-CM

## (undated) DEVICE — PK CHST BRST 10

## (undated) DEVICE — STERILE PVP: Brand: MEDLINE INDUSTRIES, INC.

## (undated) DEVICE — CLN MEGADYNE TP SS

## (undated) DEVICE — ANTIBACTERIAL UNDYED BRAIDED (POLYGLACTIN 910), SYNTHETIC ABSORBABLE SUTURE: Brand: COATED VICRYL

## (undated) DEVICE — TBG PENCL TELESCP MEGADYNE SMOKE EVAC 10FT

## (undated) DEVICE — SUT ETHLN 5/0 P3 18IN 698G

## (undated) DEVICE — BLANKT WARM UNDER/BDY FUL/ACC A/ 90X206CM

## (undated) DEVICE — UNDERGLV SURG BIOGEL INDICATOR LF PF 7.5

## (undated) DEVICE — TBG SXN LIPECTOMY 108IN

## (undated) DEVICE — SUT ETHLN 3/0 PC5 18IN 1893G

## (undated) DEVICE — TRAP FLD MINIVAC MEGADYNE 100ML

## (undated) DEVICE — SUT SILK 2/0 SH 30IN K833H

## (undated) DEVICE — DISH PETRI 3.5IN MD STRL LF

## (undated) DEVICE — DRSNG TELFA PAD NONADH STR 1S 3X8IN

## (undated) DEVICE — ADHS SKIN PREMIERPRO EXOFIN TOPICAL HI/VISC .5ML

## (undated) DEVICE — SUT GUT PLN FAST ABS 5/0 PC1 18IN 1915G

## (undated) DEVICE — PATIENT RETURN ELECTRODE, SINGLE-USE, CONTACT QUALITY MONITORING, ADULT, WITH 9FT CORD, FOR PATIENTS WEIGING OVER 33LBS. (15KG): Brand: MEGADYNE

## (undated) DEVICE — BANDAGE,GAUZE,BULKEE II,4.5"X4.1YD,STRL: Brand: MEDLINE

## (undated) DEVICE — SWABSTK SKINPREP PVPI PRE/SAT 8IN STRL

## (undated) DEVICE — GLV SURG SENSICARE W/ALOE PF LF 7 STRL

## (undated) DEVICE — SYR LUERLOK 20CC BX/50

## (undated) DEVICE — BLANKT WARM LOWR/BDY 100X120CM

## (undated) DEVICE — INTENDED FOR TISSUE SEPARATION, AND OTHER PROCEDURES THAT REQUIRE A SHARP SURGICAL BLADE TO PUNCTURE OR CUT.: Brand: BARD-PARKER ® STAINLESS STEEL BLADES

## (undated) DEVICE — SUT SILK 3/0 TIES 18IN A184H

## (undated) DEVICE — APPL CHLORAPREP W/TINT 26ML BLU

## (undated) DEVICE — SUT SILK 2/0 TIES 18IN A185H

## (undated) DEVICE — SUT VIC 2/0 CT1 CR8 18IN J839D

## (undated) DEVICE — SUT SILK 2/0 PS 18IN 1588H

## (undated) DEVICE — TP PLSTC CLR TRNSPORE 1IN SURG

## (undated) DEVICE — APPL CHLORAPREP TINTED 26ML TEAL

## (undated) DEVICE — Device

## (undated) DEVICE — ELECTRD BLD EZ CLN MOD XLNG 2.75IN

## (undated) DEVICE — SYS FAT GRAFTING REVOLVE SGL

## (undated) DEVICE — PROXIMATE RH ROTATING HEAD SKIN STAPLERS (35 WIDE) CONTAINS 35 STAINLESS STEEL STAPLES: Brand: PROXIMATE

## (undated) DEVICE — SUT MNCRYL PLS ANTIB UD 3/0 PS2 27IN

## (undated) DEVICE — SYR LUERLOK 50ML

## (undated) DEVICE — GOWN,NON-REINFORCED,SIRUS,SET IN SLV,XL: Brand: MEDLINE

## (undated) DEVICE — IRRIGATOR BULB ASEPTO 60CC STRL

## (undated) DEVICE — SPNG LAP PREWSH SFTPK 18X18IN STRL PK/5

## (undated) DEVICE — SUT MNCRYL PLS ANTIB UD 4/0 PS2 18IN

## (undated) DEVICE — SYR CATH/TIP 50ML 2OZ STRL 1P/U

## (undated) DEVICE — BNDR ABD PREMIUM/UNIV 10IN 27TO48IN

## (undated) DEVICE — LINER CANSTR SXN HERCULES

## (undated) DEVICE — 1010 S-DRAPE TOWEL DRAPE 10/BX: Brand: STERI-DRAPE™

## (undated) DEVICE — SPNG GZ WOVN 4X4IN 12PLY 10/BX STRL

## (undated) DEVICE — DECANTER BAG 9": Brand: MEDLINE INDUSTRIES, INC.

## (undated) DEVICE — LEX GENERAL BREAST: Brand: MEDLINE INDUSTRIES, INC.

## (undated) DEVICE — PREVENA PEEL & PLACE SYSTEM KIT- 13 CM: Brand: PREVENA™ PEEL & PLACE™

## (undated) DEVICE — GLV SURG SENSICARE PI ORTHO SZ7.5 LF STRL

## (undated) DEVICE — INTRAOPERATIVE COVER KIT, 10 PACK: Brand: SITE-RITE